# Patient Record
Sex: MALE | Race: ASIAN | NOT HISPANIC OR LATINO | Employment: FULL TIME | URBAN - METROPOLITAN AREA
[De-identification: names, ages, dates, MRNs, and addresses within clinical notes are randomized per-mention and may not be internally consistent; named-entity substitution may affect disease eponyms.]

---

## 2020-08-12 NOTE — TELEPHONE ENCOUNTER
RECORDS RECEIVED FROM: N/A   DATE RECEIVED: 2020   NOTES STATUS DETAILS   OFFICE NOTE from referring provider N/A    OFFICE NOTE from other specialist Care Everywhere [facility]:  - 17 Office visit with Dr. Marisel White  - 7/10/17 Office visit with Dr. Oni Fernandez  - 16 Office visit with Eleonora Forbes PA-C  - 16 Office visit with Dr. Abelardo Brooks    DISCHARGE SUMMARY from hospital Care Everywhere 17 ([facility])    OPERATIVE REPORT Care Everywhere Sigmoidoscopy Flexible: 17   MEDICATION LIST Care Everywhere         ENDOSCOPY  N/A    COLONOSCOPY Care Everywheree 18 ([facility])    ERCP N/A    EUS N/A      STOOL TESTING Care Everywhere 2020   PERTINENT LABS Care Everywhere    PATHOLOGY REPORTS (RELATED) Care Everywhere 18   IMAGING (CT, MRI, EGD) Care Everywhere CT Abdomen Pelvis: 17, 17 ([facility])       REFERRAL INFORMATION    Date referral was placed: 2020   Date all records received:    Date records were scanned into EPIC:    Date records were sent to Provider to review:    Date and recommendation received from provider:  LETTER SENT  SCHEDULE APPOINTMENT   Date patient was contacted to schedule: 2020 4:35pm Called and spoke with Pt. Pt noted that he has been seen with [facility] in [place]. [facility]'s records are accessible via Care Everywhere. -ao     2020 4:59pm Called [facility] Radiology dept; called to inquire about fax number to send request for images to. [facility] was given fax number: [phone]. [employer]. -Bhao    2020 3:40pm Called [facility] ([phone]) and asked to be directed to their medical records dept. Spoke with Dennise and she mentioned that I would need to speak with the radiology dept and they can help me; phone number of [phone] was given to [facility] to call.  "Called the radiology dept and spoke with Iona FONTAINE to contact [employer] at [phone]. CSS called [employer] and spoke with a rep (name was heard to understand) and per rep, she gave a different number of [phone] to call for the CD. CSS called the [phone] and phone kept ringing sapnae a while and then an automated message came on and said \"you have received a non working number at [employer]...\".     9/17/2020 4:07pm Fax request sent to [facility] ([phone]) for image. -Jose A         "

## 2020-09-21 NOTE — TELEPHONE ENCOUNTER
RECORDS RECEIVED FROM: N/A   DATE RECEIVED: 2020   NOTES STATUS DETAILS   OFFICE NOTE from referring provider N/A    OFFICE NOTE from other specialist Care Everywhere [facility]:  - 17 Office visit with Dr. Marisel White  - 7/10/17 Office visit with Dr. Oni Fernandez  - 16 Office visit with Eleonora Forbes PA-C  - 16 Office visit with Dr. Abelardo Brooks    DISCHARGE SUMMARY from hospital Care Everywhere 17 ([facility])    OPERATIVE REPORT Care Everywhere Sigmoidoscopy Flexible: 17   MEDICATION LIST Care Everywhere         ENDOSCOPY  N/A    COLONOSCOPY Care Everywheree 18 ([facility])    ERCP N/A    EUS N/A      STOOL TESTING Care Everywhere 2020   PERTINENT LABS Care Everywhere    PATHOLOGY REPORTS (RELATED) Care Everywhere 18   IMAGING (CT, MRI, EGD) Care Everywhere CT Abdomen Pelvis: 17, 17 ([facility])       REFERRAL INFORMATION    Date referral was placed: 2020   Date all records received:    Date records were scanned into EPIC:    Date records were sent to Provider to review:    Date and recommendation received from provider:  LETTER SENT  SCHEDULE APPOINTMENT   Date patient was contacted to schedule: 2020 4:35pm Called and spoke with Pt. Pt noted that he has been seen with [facility] in [place]. [facility]'s records are accessible via Care Everywhere. -ao     2020 4:59pm Called [facility] Radiology dept; called to inquire about fax number to send request for images to. [facility] was given fax number: [phone]. [employer]. -Bhao    2020 3:40pm Called [facility] ([phone]) and asked to be directed to their medical records dept. Spoke with Dennise and she mentioned that I would need to speak with the radiology dept and they can help me; phone number of [phone] was given to [facility] to call.  "Called the radiology dept and spoke with Iona FONTAINE to contact [employer] at [phone]. CSS called [employer] and spoke with a rep (name was hard to understand) and per rep, she gave a different number of [phone] to call for the CD. CSS called the [phone] and phone kept ringing foe a while and then an automated message came on and said \"you have received a non working number at [employer]...\". -Bhao    9/17/2020 4:07pm Fax request sent to [facility] ([phone]) for image. -Bhao     9/21/2020 11:28am Called [facility] medical records dept at [phone] and spoke with Jesenia. Per Jesenia, they do not have this Pt in their system. Called and LVM for Pt regarding images. CSS will notify MD/RN of missing images. -Bhao       "

## 2020-09-23 ENCOUNTER — PRE VISIT (OUTPATIENT)
Dept: GASTROENTEROLOGY | Facility: CLINIC | Age: 20
End: 2020-09-23

## 2020-09-23 ENCOUNTER — VIRTUAL VISIT (OUTPATIENT)
Dept: GASTROENTEROLOGY | Facility: CLINIC | Age: 20
End: 2020-09-23
Payer: COMMERCIAL

## 2020-09-23 VITALS — WEIGHT: 177 LBS | HEIGHT: 66 IN | BODY MASS INDEX: 28.45 KG/M2

## 2020-09-23 DIAGNOSIS — K51.00 ULCERATIVE PANCOLITIS (H): Primary | ICD-10-CM

## 2020-09-23 RX ORDER — ALBUTEROL SULFATE 90 UG/1
1-2 AEROSOL, METERED RESPIRATORY (INHALATION) EVERY 4 HOURS PRN
COMMUNITY
Start: 2019-12-04 | End: 2023-08-28

## 2020-09-23 RX ORDER — OMEPRAZOLE 40 MG/1
40 CAPSULE, DELAYED RELEASE ORAL DAILY PRN
COMMUNITY

## 2020-09-23 RX ORDER — BUDESONIDE AND FORMOTEROL FUMARATE DIHYDRATE 160; 4.5 UG/1; UG/1
2 AEROSOL RESPIRATORY (INHALATION)
COMMUNITY
Start: 2019-12-05 | End: 2020-09-28

## 2020-09-23 ASSESSMENT — MIFFLIN-ST. JEOR: SCORE: 1745.62

## 2020-09-23 ASSESSMENT — PAIN SCALES - GENERAL: PAINLEVEL: NO PAIN (0)

## 2020-09-23 NOTE — LETTER
Date:September 29, 2020      Patient was self referred, no letter generated. Do not send.        [facility] Health Information

## 2020-09-23 NOTE — LETTER
"    9/23/2020         RE: Jae Sparks  [address]  [place] [facility] [address]        Dear Colleague,    Thank you for referring your patient, Jae Sparks, to the [facility]STRO[facility] IBD CLINIC. Please see a copy of my visit note below.    Jae Sparks is a 20 year old male who is being evaluated via a billable video visit.      The patient has been notified of following:     \"This video visit will be conducted via a call between you and your physician/provider. We have found that certain health care needs can be provided without the need for an in-person physical exam.  This service lets us provide the care you need with a video conversation.  If a prescription is necessary we can send it directly to your pharmacy.  If lab work is needed we can place an order for that and you can then stop by our lab to have the test done at a later time.    Video visits are billed at different rates depending on your insurance coverage.  Please reach out to your insurance provider with any questions.    If during the course of the call the physician/provider feels a video visit is not appropriate, you will not be charged for this service.\"    Patient has given verbal consent for Video visit? Yes  How would you like to obtain your AVS? MyChart  If you are dropped from the video visit, the video invite should be resent to: Text to cell phone: [phone]  Will anyone else be joining your video visit? No       During this virtual visit the patient is located in [place], patient verifies this as the location during the entirety of this visit.     [facility] UC NEW       PATIENT: Jae Sparks    MRN: [id number]    Date of Birth 2000    Tel: [phone] (home)     PCP: No primary care provider on file.     HPI: Mr. Sparks is a 20 year old year old male here to establish care for ulcerative pancolitis.     UC history    Jae was diagnosed with pan-UC in late 2017. He was started on " prednisone and Humira 8/2017. He felt somewhat better on Humira but lost response about a week with continued symptoms. Trough level in February 2018 -undetectable Humira level without antibodies and dose changed to weekly 2/2018.     Could not continue with self injections of Humira, so switched to Inflectra May 2020 (Dr. Landeros,  in [place]). Dose increased to 7.5 mg/kg q8 w when level was low (4.58, 0 ATIs) and FC was elevated at 441.     Last inflectra dose was 8 weeks ago.      Macroscopic extent of disease (most recent) E3    Current UC symptoms    Bowel frequency in day 3 (in the morning)   Bowel frequency in night 0  Urgency of defecation occasional  Blood in stool none  General well being 0 = very well  Extracolonic features (multiple select) none     Constitutional symptoms:  Fever NO  Weight loss NO    Noteworthy diet history- no dairy, low fiber     Other GI symptoms present none    Total number of IBD surgeries (except perianal): 0    Current IBD Medications:  Inflectra    Past IBD Medications:   Prednisone  Humira    Past Medical History:   Diagnosis Date     Uncomplicated asthma         Past Surgical History:   Procedure Laterality Date     NO HISTORY OF SURGERY         Social History     Tobacco Use     Smoking status: Never Smoker     Smokeless tobacco: Never Used   Substance Use Topics     Alcohol use: Not on file       Family History   Problem Relation Age of Onset     Inflammatory Bowel Disease No family hx of      Autoimmune Disease No family hx of      Colon Cancer No family hx of        Allergies   Allergen Reactions     Pollen Extract Itching     Cantaloupe (Diagnostic) Hives and Itching        Outpatient Encounter Medications as of 9/23/2020   Medication Sig Dispense Refill     albuterol (PROAIR HFA/PROVENTIL HFA/VENTOLIN HFA) 108 (90 Base) MCG/ACT inhaler        budesonide-formoterol (SYMBICORT) 160-4.5 MCG/ACT Inhaler Inhale 2 puffs into the lungs       inFLIXimab-dyyb (INFLECTRA) 100 MG  "injection Inject 400 mg into the vein       omeprazole (PRILOSEC) 40 MG DR capsule Take 40 mg by mouth       No facility-administered encounter medications on file as of 9/23/2020.      NSAID  NO    Review of Systems  Complete 10 System ROS performed. All are negative except as documented below, in the HPI, or in patient questionnaire from today's visit.    1) Constitutional: No fevers, chills, night sweats or malaise, weight loss or gain  2) Skin: No rash  3) Pulmonary: No wheeze, SOB, cough, sputum or hemoptysis  4) Cardiovascular: No Chest pain or palpitations  5) Genitourinary: No blood in urine or dysuria  6) Endocrine: No increased sweating, hunger, thirst or thyroid problems  7) Hematologic: No bruising and easy bleeding  8) Musculoskeletal: no new pain in joints or limitation in ROM  9) Neurologic: No dizziness, paresthesias or weakness or falls  10) Psychiatric:  not depressed/anxious, no sleep problems    PHYSICAL EXAM  Vitals: Ht 1.676 m (5' 6\")   Wt 80.3 kg (177 lb)   BMI 28.57 kg/m      No Pain (0)     General appearance  Healthy appearing adult, in no acute distress     Eyes  Sclera anicteric  Pupils round and reactive to light     Ears, nose, mouth and throat  No obvious external lesions of ears and nose  Hearing intact     Neck  Symmetric  No obvious external lesions     Respiratory  Normal respiration, no use of accessory muscles      MSK  Gait normal     Skin  No rashes or jaundice      Psychiatric  Oriented to person, place and time  Appropriate mood and affect.   DATA:  Reviewed in detail past documentation, medications and prior workup available in electronic health records or through outside records.    PERTINENT STUDIES:  Tuberculosis: QFN neg 12/2019  HBV serology neg in 1/2018    DRUG MONITORING  Biologic concentration: IFX trough 4.58 (ATIs were not tested, given presence of drug level)    5/16/2016 Flex sig: Signs of colonic inflammation identified in the left colon predominantly, likely " beyond  Bx - mild active colitis, favored to be a resolving or self limited infectious colitis    7/10/2017 Flex sig: Moderate colitis from rectum to 40cm consistent with ulcerative colitis.  Bx - chronic colitis, moderately active.  Treated with lialda, an ti-TNF agent discussed but not started.    8/2/2017 CT Abdomen/Pelvis: ? Acute pyelonephritis, diffuse colon wall thickening.    8/4/2017 Sigmoidoscopy: Punched out deep ulceration/edema/erythema up to 70 cm. Biopsies take from mid-descending, distal descending, proximal sigmoid, distal sigmoid, and rectum.    6/7/2018 Colonoscopy for IBD disease assessment:  Scarring seen throughout the colon c/w healed ulcerative colitis. Scattered pseudopolyps seen throughout the colon, most marked in the ascending colon and cecum. Mild pinpoint erosions were seen in the terminal ileum. Random biopsies were taken throughout.   Endoscopic examination was performed to the Terminal ileum. This was for assessment of ulcerative colitis disease activity.  The Lee score for each colonic segment is below:  Rectum: 0 (normal) - normal path  Sigmoid colon: 0 (normal) - normal path  Descending colon: 0 (normal) - normal path  Transverse colon: 0 (normal) - normal path  Ascending colon: 0 (normal) - focal active colitis  Cecum: 0 (normal) - normal path  Terminal ileum: Mild pinpoint erosions. - focal active ileitis - ?medication or prep effect  Based on your overall colonoscopy findings, you have colitis that is Completely healed (Lee 0), though there is possible mild terminal ileitis.       IMPRESSION:    DIAGNOSIS:  # E3 UC in clinical remission on inflectra 7.5 mg/kg q8w    Mr. Sparks is a 20 year old here with E3 UC in clinical remission on Inflectra 7.5 mg/kg q8w. His dose was recently increased from 5 mg/kg given FC of 404 in 7/28/2020.  We will plan for the following:    PLAN:  ---Expedite Inflectra infusion at 7.5 mg/kg q8 weeks  ---Check fecal calprotectin  ------------If  elevated, will check Inflectra level and adjust accordingly.   ---OK to liberalize diet     IBD Health Care Maintenance:  ---Referral to dermatology  ---Referral to [facility] pharmacy for healthcare maintenance   ---Colon cancer screening to start in 2025    Misc:  -- Avoid tobacco use  -- Avoid NSAIDs as there is potentially a 25% chance of causing an IBD flare    Return in about 3 months (around 12/23/2020).    Jigna Eng MD   of Medicine  Division of Gastroenterology, Hepatology and Nutrition  [facility]    September 23, 2020      Video-Visit Details    Type of service:  Video Visit    Video Start Time: 3:45 PM  Video End Time:  4:30 PM    Originating Location (pt. Location): Home    Distant Location (provider location):  [facility] GASTROENTEROLOGY AND IBD CLINIC     Platform used for Video Visit: [employer]            Again, thank you for allowing me to participate in the care of your patient.        Sincerely,        Jigna Eng MD

## 2020-09-23 NOTE — PROGRESS NOTES
"Jae Sparks is a 20 year old male who is being evaluated via a billable video visit.      The patient has been notified of following:     \"This video visit will be conducted via a call between you and your physician/provider. We have found that certain health care needs can be provided without the need for an in-person physical exam.  This service lets us provide the care you need with a video conversation.  If a prescription is necessary we can send it directly to your pharmacy.  If lab work is needed we can place an order for that and you can then stop by our lab to have the test done at a later time.    Video visits are billed at different rates depending on your insurance coverage.  Please reach out to your insurance provider with any questions.    If during the course of the call the physician/provider feels a video visit is not appropriate, you will not be charged for this service.\"    Patient has given verbal consent for Video visit? Yes  How would you like to obtain your AVS? MyChart  If you are dropped from the video visit, the video invite should be resent to: Text to cell phone: [phone]  Will anyone else be joining your video visit? No       During this virtual visit the patient is located in [place], patient verifies this as the location during the entirety of this visit.     [facility] UC NEW       PATIENT: Jae Sparks    MRN: [id number]    Date of Birth 2000    Tel: [phone] (home)     PCP: No primary care provider on file.     HPI: Mr. Sparks is a 20 year old year old male here to establish care for ulcerative pancolitis.     UC history    Jae was diagnosed with pan-UC in late 2017. He was started on prednisone and Humira 8/2017. He felt somewhat better on Humira but lost response about a week with continued symptoms. Trough level in February 2018 -undetectable Humira level without antibodies and dose changed to weekly 2/2018.     Could not continue with self " injections of Humira, so switched to Inflectra May 2020 (Dr. Landeros,  in [place]). Dose increased to 7.5 mg/kg q8 w when level was low (4.58, 0 ATIs) and FC was elevated at 441.     Last inflectra dose was 8 weeks ago.      Macroscopic extent of disease (most recent) E3    Current UC symptoms    Bowel frequency in day 3 (in the morning)   Bowel frequency in night 0  Urgency of defecation occasional  Blood in stool none  General well being 0 = very well  Extracolonic features (multiple select) none     Constitutional symptoms:  Fever NO  Weight loss NO    Noteworthy diet history- no dairy, low fiber     Other GI symptoms present none    Total number of IBD surgeries (except perianal): 0    Current IBD Medications:  Inflectra    Past IBD Medications:   Prednisone  Humira    Past Medical History:   Diagnosis Date     Uncomplicated asthma         Past Surgical History:   Procedure Laterality Date     NO HISTORY OF SURGERY         Social History     Tobacco Use     Smoking status: Never Smoker     Smokeless tobacco: Never Used   Substance Use Topics     Alcohol use: Not on file       Family History   Problem Relation Age of Onset     Inflammatory Bowel Disease No family hx of      Autoimmune Disease No family hx of      Colon Cancer No family hx of        Allergies   Allergen Reactions     Pollen Extract Itching     Cantaloupe (Diagnostic) Hives and Itching        Outpatient Encounter Medications as of 9/23/2020   Medication Sig Dispense Refill     albuterol (PROAIR HFA/PROVENTIL HFA/VENTOLIN HFA) 108 (90 Base) MCG/ACT inhaler        budesonide-formoterol (SYMBICORT) 160-4.5 MCG/ACT Inhaler Inhale 2 puffs into the lungs       inFLIXimab-dyyb (INFLECTRA) 100 MG injection Inject 400 mg into the vein       omeprazole (PRILOSEC) 40 MG DR capsule Take 40 mg by mouth       No facility-administered encounter medications on file as of 9/23/2020.      NSAID  NO    Review of Systems  Complete 10 System ROS performed. All are negative  "except as documented below, in the HPI, or in patient questionnaire from today's visit.    1) Constitutional: No fevers, chills, night sweats or malaise, weight loss or gain  2) Skin: No rash  3) Pulmonary: No wheeze, SOB, cough, sputum or hemoptysis  4) Cardiovascular: No Chest pain or palpitations  5) Genitourinary: No blood in urine or dysuria  6) Endocrine: No increased sweating, hunger, thirst or thyroid problems  7) Hematologic: No bruising and easy bleeding  8) Musculoskeletal: no new pain in joints or limitation in ROM  9) Neurologic: No dizziness, paresthesias or weakness or falls  10) Psychiatric:  not depressed/anxious, no sleep problems    PHYSICAL EXAM  Vitals: Ht 1.676 m (5' 6\")   Wt 80.3 kg (177 lb)   BMI 28.57 kg/m      No Pain (0)     General appearance  Healthy appearing adult, in no acute distress     Eyes  Sclera anicteric  Pupils round and reactive to light     Ears, nose, mouth and throat  No obvious external lesions of ears and nose  Hearing intact     Neck  Symmetric  No obvious external lesions     Respiratory  Normal respiration, no use of accessory muscles      MSK  Gait normal     Skin  No rashes or jaundice      Psychiatric  Oriented to person, place and time  Appropriate mood and affect.   DATA:  Reviewed in detail past documentation, medications and prior workup available in electronic health records or through outside records.    PERTINENT STUDIES:  Tuberculosis: QFN neg 12/2019  HBV serology neg in 1/2018    DRUG MONITORING  Biologic concentration: IFX trough 4.58 (ATIs were not tested, given presence of drug level)    5/16/2016 Flex sig: Signs of colonic inflammation identified in the left colon predominantly, likely beyond  Bx - mild active colitis, favored to be a resolving or self limited infectious colitis    7/10/2017 Flex sig: Moderate colitis from rectum to 40cm consistent with ulcerative colitis.  Bx - chronic colitis, moderately active.  Treated with lialda, an ti-TNF " agent discussed but not started.    8/2/2017 CT Abdomen/Pelvis: ? Acute pyelonephritis, diffuse colon wall thickening.    8/4/2017 Sigmoidoscopy: Punched out deep ulceration/edema/erythema up to 70 cm. Biopsies take from mid-descending, distal descending, proximal sigmoid, distal sigmoid, and rectum.    6/7/2018 Colonoscopy for IBD disease assessment:  Scarring seen throughout the colon c/w healed ulcerative colitis. Scattered pseudopolyps seen throughout the colon, most marked in the ascending colon and cecum. Mild pinpoint erosions were seen in the terminal ileum. Random biopsies were taken throughout.   Endoscopic examination was performed to the Terminal ileum. This was for assessment of ulcerative colitis disease activity.  The Lee score for each colonic segment is below:  Rectum: 0 (normal) - normal path  Sigmoid colon: 0 (normal) - normal path  Descending colon: 0 (normal) - normal path  Transverse colon: 0 (normal) - normal path  Ascending colon: 0 (normal) - focal active colitis  Cecum: 0 (normal) - normal path  Terminal ileum: Mild pinpoint erosions. - focal active ileitis - ?medication or prep effect  Based on your overall colonoscopy findings, you have colitis that is Completely healed (Lee 0), though there is possible mild terminal ileitis.       IMPRESSION:    DIAGNOSIS:  # E3 UC in clinical remission on inflectra 7.5 mg/kg q8w    Mr. Sparks is a 20 year old here with E3 UC in clinical remission on Inflectra 7.5 mg/kg q8w. His dose was recently increased from 5 mg/kg given FC of 404 in 7/28/2020.  We will plan for the following:    PLAN:  ---Expedite Inflectra infusion at 7.5 mg/kg q8 weeks  ---Check fecal calprotectin  ------------If elevated, will check Inflectra level and adjust accordingly.   ---OK to liberalize diet     IBD Health Care Maintenance:  ---Referral to dermatology  ---Referral to [facility] pharmacy for healthcare maintenance   ---Colon cancer screening to start in 2025    Misc:  -- Avoid  tobacco use  -- Avoid NSAIDs as there is potentially a 25% chance of causing an IBD flare    Return in about 3 months (around 12/23/2020).    Jigna Eng MD   of Medicine  Division of Gastroenterology, Hepatology and Nutrition  [facility]    September 23, 2020      Video-Visit Details    Type of service:  Video Visit    Video Start Time: 3:45 PM  Video End Time:  4:30 PM    Originating Location (pt. Location): Home    Distant Location (provider location):  [facility] GASTROENTEROLOGY AND IBD CLINIC     Platform used for Video Visit: [employer]

## 2020-09-23 NOTE — NURSING NOTE
"Chief Complaint   Patient presents with     Consult     Appointment for ulcerative colitis.       Vitals:    09/23/20 1524   Weight: 80.3 kg (177 lb)   Height: 1.676 m (5' 6\")       Body mass index is 28.57 kg/m .                            ANGELA RAMIREZ, EMT    "

## 2020-09-23 NOTE — PATIENT INSTRUCTIONS
PLAN  ---Referral to dermatology  ---Referral to [facility] pharmacy for healthcare maintenance   ---OK to liberalize diet   ---Expedite Inflectra infusion at 7.5 mg/kg q8 weeks  ---Check fecal calprotectin  ------------If elevated, will check Inflectra level and adjust accordingly.

## 2020-09-24 ENCOUNTER — PATIENT OUTREACH (OUTPATIENT)
Dept: GASTROENTEROLOGY | Facility: CLINIC | Age: 20
End: 2020-09-24

## 2020-09-24 DIAGNOSIS — K51.90 ULCERATIVE COLITIS (H): ICD-10-CM

## 2020-09-24 RX ORDER — DIPHENHYDRAMINE HCL 25 MG
25 CAPSULE ORAL ONCE
Status: CANCELLED
Start: 2020-09-24

## 2020-09-24 RX ORDER — METHYLPREDNISOLONE SODIUM SUCCINATE 125 MG/2ML
125 INJECTION, POWDER, LYOPHILIZED, FOR SOLUTION INTRAMUSCULAR; INTRAVENOUS ONCE
Status: CANCELLED | OUTPATIENT
Start: 2020-09-24

## 2020-09-24 RX ORDER — ACETAMINOPHEN 325 MG/1
650 TABLET ORAL ONCE
Status: CANCELLED
Start: 2020-09-24

## 2020-09-24 NOTE — PROGRESS NOTES
Patient called back and is in agreement with in home infusion  Will do calprotectin stool study   Aware that he will be called for derm appt and mtm    Follow up appt   Discussed calprotectin.  In basket to team to sent collection kit

## 2020-09-24 NOTE — PROGRESS NOTES
Patient establishing care [facility]. Eng  Patient due for inflectra today  Patient received 7.5 mg/kg every 8 weeks.   Inflectra therapy plan entered with standing lab orders every other infusion  Contact prior team for a stat determination on site of care.

## 2020-09-24 NOTE — PROGRESS NOTES
Patient needs to have oupatient infusion  Josefina will work on obtaining prior   Sent pt a my chart message and voice mail message.

## 2020-09-25 ENCOUNTER — HOME INFUSION (PRE-WILLOW HOME INFUSION) (OUTPATIENT)
Dept: PHARMACY | Facility: CLINIC | Age: 20
End: 2020-09-25

## 2020-09-25 NOTE — PROGRESS NOTES
[facility] will approve remicade so therapy plan edited to reflect this change.   [facility] will reach out to pt to set up appointment.    Left a message and my chart message for patient

## 2020-09-28 ENCOUNTER — VIRTUAL VISIT (OUTPATIENT)
Dept: PHARMACY | Facility: CLINIC | Age: 20
End: 2020-09-28
Payer: COMMERCIAL

## 2020-09-28 ENCOUNTER — PATIENT OUTREACH (OUTPATIENT)
Dept: GASTROENTEROLOGY | Facility: CLINIC | Age: 20
End: 2020-09-28

## 2020-09-28 DIAGNOSIS — J30.2 SEASONAL ALLERGIC RHINITIS, UNSPECIFIED TRIGGER: ICD-10-CM

## 2020-09-28 DIAGNOSIS — K51.00 ULCERATIVE PANCOLITIS (H): Primary | ICD-10-CM

## 2020-09-28 PROCEDURE — 99607 MTMS BY PHARM ADDL 15 MIN: CPT | Mod: TEL | Performed by: PHARMACIST

## 2020-09-28 PROCEDURE — 99605 MTMS BY PHARM NP 15 MIN: CPT | Mod: TEL | Performed by: PHARMACIST

## 2020-09-28 RX ORDER — CETIRIZINE HYDROCHLORIDE 10 MG/1
10 TABLET ORAL DAILY
Status: CANCELLED
Start: 2020-09-28

## 2020-09-28 RX ORDER — ACETAMINOPHEN 325 MG/1
650 TABLET ORAL ONCE
Status: CANCELLED
Start: 2020-09-28

## 2020-09-28 RX ORDER — FEXOFENADINE HCL 180 MG/1
180 TABLET ORAL DAILY PRN
COMMUNITY

## 2020-09-28 NOTE — PROGRESS NOTES
Patient has been taking tylenol and cetirzine 30 minutes prioir to his infusions in the past and therapy plan edited to reflect this. Daria from [facility] pharmacist notified. Patient aware. First infusion on September 30.

## 2020-09-28 NOTE — PROGRESS NOTES
This is a recent snapshot of the patient's [facility] medical record.  For current drug dose and complete information and questions, call [phone] or In Basket pool, fv home infusion ([id number])  CSN Number:  [id number]

## 2020-09-28 NOTE — PROGRESS NOTES
MTM ENCOUNTER  SUBJECTIVE/OBJECTIVE:                           Jae Sparks is a 20 year old male called for an initial visit. He was referred to me from Dr. Eng.    Patient consented to a telehealth visit: yes  Telemedicine Visit Details  Type of service:  Telephone visit  Start Time: 1:00 PM  End Time: 1:22 PM  Originating Location (pt. Location): Home  Distant Location (provider location):  [facility] MTM  Mode of Communication:  Telephone    Chief Complaint: None - IBD health maintenance review requested by provider    Allergies/ADRs: Reviewed in chart  Tobacco: He reports that he has never smoked. He has never used smokeless tobacco.  Alcohol: 1-3 beverages / week    Medication Adherence/Access: no issues reported    Ulcerative Colitis:   Inflectra + pre-meds (acetaminophen and cetirizine) 7.5 mg/kg every 8 weeks   Omeprazole 40 mg PRN    Recently moved here to go to [employer]. Saw Dr. Eng for a virtual visit on 9/23/20. His dose of an Inflectra was recently increased to 7.5 mg/kg from 5 mg/kg. He notes that omeprazole is primary needed if he is having a flare.    IBD Health Care Maintenance:    Vaccinations:  All patients on biologics should avoid live vaccines.    -- Influenza (every year) reports getting  9/23/2020  -- TdaP (every 10 years) last 5/12/2016  -- Pneumococcal Pneumonia (once plus booster at 5 years)   - Prevnar-13 1/10/2018   - Pneumovax-23 3/26/2018, due 2023  -- Yearly assessment for latent Tb (verbal screening and exam, PPD or QuantiFERON-Tb testing)   - negative 5/3/2019    One time confirmation of immunity or serologies:  -- Hepatitis A (serologies or immunizations) vaccination complete 2002  -- Hepatitis B (serologies or immunizations) vaccination complete 1998, booster x 1 dose 2016 1/31/2018 Hepatitis B Surface Ab Unit <=9.99 mIU/mL 5.44 Hep Bs Ab Interp Negative Comment: An antibody of 10.00 mIU/ml or greater implies immunity.    Reference interval                    9.99 mIU/ml or less: Negative                   10.00 mIU/ml or greater: Positive     -- Varicella vaccination complete 5/20/2016  -- MMR third dose 5/20/2016  -- HPV (all aged 18-26)  1/21/2019, 8/4/2014, 8/19/2013  -- Meningococcal meningitis (all patients at risk for meningitis)-- 5/12/2016 and 8/4/2014    Due to the immunosuppression in this patient, I would not advise administration of live vaccines such as varicella/VZV, intranasal influenza, MMR, or yellow fever vaccine (if traveling).      Bone mineral density screening   -- Recommend all patients supplement with calcium and vitamin D  -- DEXA scan 11/20/2019 per [facility] density is within the expected range for age based on the z-score  - no steroid exposure in the last year     Cancer Screening:  Colon cancer screening: to start in 2025 per provider notes.    Skin cancer screening: Annual visual exam of skin by dermatologist since patient is immunocompromised Referral    Depression Screening:  -- Over the last month, have you felt down, depressed, or hopeless? No  -- Over the last month, have you felt little interest or pleasure doing things? No    Misc:  -- Avoid tobacco use  -- Avoid NSAIDs as there is potentially a 25% chance of causing an IBD flare      Allergies:   Albuterol HFA  Allegra daily prn allergies spring-summer    Hasn't used the albuterol in awhile. Notes this is mainly only for when he gets sick. Not needing Allegra at this time. No reported concerns.    ASSESSMENT:                            Medication Adherence: No issues identified    Ulcerative Colitis: Jae would likely benefit from considering a repeat of the Hep B series given his available serologies indicating lack of immunity.     Asthma/Allergies: Stable based on report.    PLAN:                            1. Jae to consider the following vaccines:  - consider repeating Hep B series    I spent 22 minutes with this patient today. I offer these suggestions for  consideration by his care team. A copy of the visit note was provided to the patient's referring provider.    Will follow up in 1 year for health maintenance review, sooner if needed.    The patient was sent via [employer] a summary of these recommendations.     Janet MoralesD, BCACP  MTM Pharmacist   [facility]  Phone: [phone]

## 2020-09-29 ENCOUNTER — HOME INFUSION (PRE-WILLOW HOME INFUSION) (OUTPATIENT)
Dept: PHARMACY | Facility: CLINIC | Age: 20
End: 2020-09-29

## 2020-09-30 ENCOUNTER — MEDICAL CORRESPONDENCE (OUTPATIENT)
Dept: HEALTH INFORMATION MANAGEMENT | Facility: CLINIC | Age: 20
End: 2020-09-30

## 2020-09-30 ENCOUNTER — HOME INFUSION (PRE-WILLOW HOME INFUSION) (OUTPATIENT)
Dept: PHARMACY | Facility: CLINIC | Age: 20
End: 2020-09-30

## 2020-09-30 LAB
ALBUMIN SERPL-MCNC: 4.3 G/DL (ref 3.4–5)
ALP SERPL-CCNC: 79 U/L (ref 40–150)
ALT SERPL W P-5'-P-CCNC: 28 U/L (ref 0–70)
AST SERPL W P-5'-P-CCNC: 18 U/L (ref 0–45)
BASOPHILS # BLD AUTO: 0 10E9/L (ref 0–0.2)
BASOPHILS NFR BLD AUTO: 0.3 %
BILIRUB DIRECT SERPL-MCNC: 0.1 MG/DL (ref 0–0.2)
BILIRUB SERPL-MCNC: 0.7 MG/DL (ref 0.2–1.3)
CRP SERPL-MCNC: 12.4 MG/L (ref 0–8)
DIFFERENTIAL METHOD BLD: NORMAL
EOSINOPHIL # BLD AUTO: 0.1 10E9/L (ref 0–0.7)
EOSINOPHIL NFR BLD AUTO: 1 %
ERYTHROCYTE [DISTWIDTH] IN BLOOD BY AUTOMATED COUNT: 12.8 % (ref 10–15)
ERYTHROCYTE [SEDIMENTATION RATE] IN BLOOD BY WESTERGREN METHOD: 5 MM/H (ref 0–15)
HCT VFR BLD AUTO: 52.1 % (ref 40–53)
HGB BLD-MCNC: 17.4 G/DL (ref 13.3–17.7)
IMM GRANULOCYTES # BLD: 0 10E9/L (ref 0–0.4)
IMM GRANULOCYTES NFR BLD: 0.3 %
LYMPHOCYTES # BLD AUTO: 1.5 10E9/L (ref 0.8–5.3)
LYMPHOCYTES NFR BLD AUTO: 16.2 %
MCH RBC QN AUTO: 29.8 PG (ref 26.5–33)
MCHC RBC AUTO-ENTMCNC: 33.4 G/DL (ref 31.5–36.5)
MCV RBC AUTO: 89 FL (ref 78–100)
MONOCYTES # BLD AUTO: 0.7 10E9/L (ref 0–1.3)
MONOCYTES NFR BLD AUTO: 8.1 %
NEUTROPHILS # BLD AUTO: 6.6 10E9/L (ref 1.6–8.3)
NEUTROPHILS NFR BLD AUTO: 74.1 %
NRBC # BLD AUTO: 0 10*3/UL
NRBC BLD AUTO-RTO: 0 /100
PLATELET # BLD AUTO: 273 10E9/L (ref 150–450)
PROT SERPL-MCNC: 8.4 G/DL (ref 6.8–8.8)
RBC # BLD AUTO: 5.83 10E12/L (ref 4.4–5.9)
WBC # BLD AUTO: 8.9 10E9/L (ref 4–11)

## 2020-09-30 PROCEDURE — 80076 HEPATIC FUNCTION PANEL: CPT | Performed by: INTERNAL MEDICINE

## 2020-09-30 PROCEDURE — 85025 COMPLETE CBC W/AUTO DIFF WBC: CPT | Performed by: INTERNAL MEDICINE

## 2020-09-30 PROCEDURE — 86140 C-REACTIVE PROTEIN: CPT | Performed by: INTERNAL MEDICINE

## 2020-09-30 PROCEDURE — 85652 RBC SED RATE AUTOMATED: CPT | Performed by: INTERNAL MEDICINE

## 2020-10-01 NOTE — PATIENT INSTRUCTIONS
Recommendations from today's MTM visit:                                                    MTM (medication therapy management) is a service provided by a clinical pharmacist designed to help you get the most of out of your medicines.   Today we reviewed what your medicines are for, how to know if they are working, that your medicines are safe and how to make your medicine regimen as easy as possible.     1. Your blood work done at [facility] in 2018 indicated that you are not protected against Hepatitis B at this time. Consider repeating this vaccination series.    It was great to speak with you today.  I value your experience and would be very thankful for your time with providing feedback on our clinic survey. You may receive a survey via email or text message in the next few days.     Next MTM visit: 1 year for health maintenance review, sooner if needed    To schedule another MTM appointment, please call the clinic directly or you may call the MTM scheduling line at [phone] or toll-free at [phone].     My Clinical Pharmacist's contact information:                                                      It was a pleasure talking with you today!  Please feel free to contact me with any questions or concerns you have.      Janet MoralesD, BCACP  MTM Pharmacist    [facility]  Phone: [phone]

## 2020-10-01 NOTE — PROGRESS NOTES
Skilled Nurse visit in the  patient home to administer Remicade 600 mg IV.  No recent elevated temperature, fever, chills, productive cough, coughing for 3 weeks or longer or hemoptysis, abnormal vital signs, night sweats, chest pain. No  decrease in appetite, unexplained weight loss or fatigue.  No other new onset medical symptoms.  Current weight 173#.  PIV placed in R FA  With two attempts.  Pre medicated with zyrtec and tylenol. Labs drawn: CBCDP, ESR, CRP and Hepatic panel. Infusion completed without complication or reaction. Pt reports therapy is highly effective in managing symptoms related to therapy.

## 2020-10-08 DIAGNOSIS — K51.00 ULCERATIVE PANCOLITIS (H): ICD-10-CM

## 2020-10-08 PROCEDURE — 99000 SPECIMEN HANDLING OFFICE-LAB: CPT | Performed by: PATHOLOGY

## 2020-10-08 PROCEDURE — 83993 ASSAY FOR CALPROTECTIN FECAL: CPT | Mod: 90 | Performed by: PATHOLOGY

## 2020-10-09 LAB — CALPROTECTIN STL-MCNT: 29.2 MG/KG (ref 0–49.9)

## 2020-10-09 NOTE — PROGRESS NOTES
This is a recent snapshot of the patient's [facility] medical record.  For current drug dose and complete information and questions, call [phone] or In Basket pool, fv home infusion ([id number])  CSN Number:  [id number]

## 2020-10-12 ENCOUNTER — HOME INFUSION (PRE-WILLOW HOME INFUSION) (OUTPATIENT)
Dept: PHARMACY | Facility: CLINIC | Age: 20
End: 2020-10-12

## 2020-10-13 NOTE — PROGRESS NOTES
This is a recent snapshot of the patient's [facility] medical record.  For current drug dose and complete information and questions, call [phone] or In Basket pool, fv home infusion ([id number])  CSN Number:  [id number]

## 2020-11-22 ENCOUNTER — HOME INFUSION (PRE-WILLOW HOME INFUSION) (OUTPATIENT)
Dept: PHARMACY | Facility: CLINIC | Age: 20
End: 2020-11-22

## 2020-11-23 ENCOUNTER — HOME INFUSION (PRE-WILLOW HOME INFUSION) (OUTPATIENT)
Dept: PHARMACY | Facility: CLINIC | Age: 20
End: 2020-11-23

## 2020-11-23 NOTE — PROGRESS NOTES
This is a recent snapshot of the patient's [facility] medical record.  For current drug dose and complete information and questions, call [phone] or In Basket pool, fv home infusion ([id number])  CSN Number:  [id number]

## 2020-11-24 NOTE — PROGRESS NOTES
This is a recent snapshot of the patient's [facility] medical record.  For current drug dose and complete information and questions, call [phone] or In Basket pool, fv home infusion ([id number])  CSN Number:  [id number]

## 2020-12-01 ENCOUNTER — APPOINTMENT (OUTPATIENT)
Dept: LAB | Facility: CLINIC | Age: 20
End: 2020-12-01
Attending: INTERNAL MEDICINE
Payer: COMMERCIAL

## 2020-12-29 ENCOUNTER — VIRTUAL VISIT (OUTPATIENT)
Dept: GASTROENTEROLOGY | Facility: CLINIC | Age: 20
End: 2020-12-29
Payer: COMMERCIAL

## 2020-12-29 VITALS — HEIGHT: 66 IN | BODY MASS INDEX: 28.12 KG/M2 | WEIGHT: 175 LBS

## 2020-12-29 DIAGNOSIS — K51.00 ULCERATIVE PANCOLITIS WITHOUT COMPLICATION (H): Primary | ICD-10-CM

## 2020-12-29 PROCEDURE — 99213 OFFICE O/P EST LOW 20 MIN: CPT | Mod: 95 | Performed by: INTERNAL MEDICINE

## 2020-12-29 ASSESSMENT — MIFFLIN-ST. JEOR: SCORE: 1728.6

## 2020-12-29 NOTE — PROGRESS NOTES
"Jae Sparks is a 20 year old male who is being evaluated via a billable video visit.      The patient has been notified of following:     \"This video visit will be conducted via a call between you and your physician/provider. We have found that certain health care needs can be provided without the need for an in-person physical exam.  This service lets us provide the care you need with a video conversation.  If a prescription is necessary we can send it directly to your pharmacy.  If lab work is needed we can place an order for that and you can then stop by our lab to have the test done at a later time.    Video visits are billed at different rates depending on your insurance coverage.  Please reach out to your insurance provider with any questions.    If during the course of the call the physician/provider feels a video visit is not appropriate, you will not be charged for this service.\"    Patient has given verbal consent for Video visit? Yes  How would you like to obtain your AVS? MyChart  If you are dropped from the video visit, the video invite should be resent to: Text to cell phone: [phone]  Will anyone else be joining your video visit? No      [facility] UC follow up       PATIENT: Jae Sparks    MRN: [id number]    Date of Birth 2000    Tel: [phone] (home)     PCP: No primary care provider on file.     HPI: Mr. Sparks is a 20 year old year old male here to establish care for ulcerative pancolitis.     UC history    Jae was diagnosed with pan-UC in late 2017. He was started on prednisone and Humira 8/2017. He felt somewhat better on Humira but lost response about a week with continued symptoms. Trough level in February 2018 -undetectable Humira level without antibodies and dose changed to weekly 2/2018.     Could not continue with self injections of Humira, so switched to Inflectra May 2020 (Dr. Landeros,  in [place]). Dose increased to 7.5 mg/kg q8 w when level was low " (4.58, 0 ATIs) and FC was elevated at 441.     Last inflectra dose was 8 weeks ago.      Macroscopic extent of disease (most recent) E3    Current UC symptoms    Bowel frequency in day 3 (in the morning)   Bowel frequency in night 0  Urgency of defecation occasional  Blood in stool none  General well being 0 = very well  Extracolonic features (multiple select) none     Constitutional symptoms:  Fever NO  Weight loss NO    Noteworthy diet history- no dairy, low fiber     Other GI symptoms present none    Total number of IBD surgeries (except perianal): 0    Current IBD Medications:  Inflectra    Past IBD Medications:   Prednisone  Humira      Interval history, 12/2020  Doing well. Next inflectra on 1/18/2021. No breakthrough symptoms.     Current UC symptoms    Bowel frequency in day 2-3   Bowel frequency in night 0  Urgency of defecation occasional  Blood in stool none  General well being 0 = very well  Extracolonic features (multiple select) none     Past Medical History:   Diagnosis Date     Uncomplicated asthma         Past Surgical History:   Procedure Laterality Date     NO HISTORY OF SURGERY         Social History     Tobacco Use     Smoking status: Never Smoker     Smokeless tobacco: Never Used   Substance Use Topics     Alcohol use: Not on file       Family History   Problem Relation Age of Onset     Inflammatory Bowel Disease No family hx of      Autoimmune Disease No family hx of      Colon Cancer No family hx of        Allergies   Allergen Reactions     Pollen Extract Itching     Cantaloupe (Diagnostic) Hives and Itching        Outpatient Encounter Medications as of 12/29/2020   Medication Sig Dispense Refill     albuterol (PROAIR HFA/PROVENTIL HFA/VENTOLIN HFA) 108 (90 Base) MCG/ACT inhaler        fexofenadine (ALLEGRA) 180 MG tablet Take 180 mg by mouth daily as needed for allergies (in spring and summer)       inFLIXimab-dyyb (INFLECTRA) 100 MG injection Inject 400 mg into the vein       omeprazole  "(PRILOSEC) 40 MG DR capsule Take 40 mg by mouth daily as needed        No facility-administered encounter medications on file as of 12/29/2020.      NSAID  NO    Review of Systems  Complete 10 System ROS performed. All are negative except as documented below, in the HPI, or in patient questionnaire from today's visit.    1) Constitutional: No fevers, chills, night sweats or malaise, weight loss or gain  2) Skin: No rash  3) Pulmonary: No wheeze, SOB, cough, sputum or hemoptysis  4) Cardiovascular: No Chest pain or palpitations  5) Genitourinary: No blood in urine or dysuria  6) Endocrine: No increased sweating, hunger, thirst or thyroid problems  7) Hematologic: No bruising and easy bleeding  8) Musculoskeletal: no new pain in joints or limitation in ROM  9) Neurologic: No dizziness, paresthesias or weakness or falls  10) Psychiatric:  not depressed/anxious, no sleep problems    PHYSICAL EXAM  Vitals: Ht 1.664 m (5' 5.5\")   Wt 79.4 kg (175 lb)   BMI 28.68 kg/m      Data Unavailable     General appearance  Healthy appearing adult, in no acute distress     Eyes  Sclera anicteric  Pupils round and reactive to light     Ears, nose, mouth and throat  No obvious external lesions of ears and nose  Hearing intact     Neck  Symmetric  No obvious external lesions     Respiratory  Normal respiration, no use of accessory muscles      MSK  Gait normal     Skin  No rashes or jaundice      Psychiatric  Oriented to person, place and time  Appropriate mood and affect.   DATA:  Reviewed in detail past documentation, medications and prior workup available in electronic health records or through outside records.    PERTINENT STUDIES:  Tuberculosis: QFN neg 12/2019  HBV serology neg in 1/2018    DRUG MONITORING  Biologic concentration: IFX trough 4.58 (ATIs were not tested, given presence of drug level)    5/16/2016 Flex sig: Signs of colonic inflammation identified in the left colon predominantly, likely beyond  Bx - mild active " colitis, favored to be a resolving or self limited infectious colitis    7/10/2017 Flex sig: Moderate colitis from rectum to 40cm consistent with ulcerative colitis.  Bx - chronic colitis, moderately active.  Treated with lialda, an ti-TNF agent discussed but not started.    8/2/2017 CT Abdomen/Pelvis: ? Acute pyelonephritis, diffuse colon wall thickening.    8/4/2017 Sigmoidoscopy: Punched out deep ulceration/edema/erythema up to 70 cm. Biopsies take from mid-descending, distal descending, proximal sigmoid, distal sigmoid, and rectum.    6/7/2018 Colonoscopy for IBD disease assessment:  Scarring seen throughout the colon c/w healed ulcerative colitis. Scattered pseudopolyps seen throughout the colon, most marked in the ascending colon and cecum. Mild pinpoint erosions were seen in the terminal ileum. Random biopsies were taken throughout.   Endoscopic examination was performed to the Terminal ileum. This was for assessment of ulcerative colitis disease activity.  The Lee score for each colonic segment is below:  Rectum: 0 (normal) - normal path  Sigmoid colon: 0 (normal) - normal path  Descending colon: 0 (normal) - normal path  Transverse colon: 0 (normal) - normal path  Ascending colon: 0 (normal) - focal active colitis  Cecum: 0 (normal) - normal path  Terminal ileum: Mild pinpoint erosions. - focal active ileitis - ?medication or prep effect  Based on your overall colonoscopy findings, you have colitis that is Completely healed (Lee 0), though there is possible mild terminal ileitis.       IMPRESSION:    DIAGNOSIS:  # E3 UC in clinical remission on inflectra 7.5 mg/kg q8w    Mr. Sparks is a 20 year old here with E3 UC in clinical remission on Inflectra 7.5 mg/kg q8w. His dose was increased from 5 mg/kg given FC of 404 in 7/28/2020.  He continues to do well and FC was normal in 10/2020.  We will plan for the following:    PLAN:  ---Continue inflectra infusion at 7.5 mg/kg q8 weeks  ---Establish care with a  PCP    IBD Health Care Maintenance:  ---See dermatology for a FBSE when able   ---Greatly appreciate [facility] pharmacy visit for healthcare maintenance. Recommend repeat HBV series.    ---Colon cancer screening to start in 2025    Misc:  -- Avoid tobacco use  -- Avoid NSAIDs as there is potentially a 25% chance of causing an IBD flare    Return in about 6 months (around 6/29/2021).    Jigna Eng MD   of Medicine  Division of Gastroenterology, Hepatology and Nutrition  [facility]      Video-Visit Details    Type of service:  Video Visit    Video Start Time: 1:17 PM  Video End Time: 1:40 PM    Originating Location (pt. Location): Home    Distant Location (provider location):  [facility] [place]     Platform used for Video Visit: Lotame

## 2020-12-29 NOTE — LETTER
"  12/29/2020       RE: Jae Sparks  [address]  [facility] [address]      Dear Colleague,    Thank you for referring your patient, Jae Sparks, to the [facility] [place]. Please see a copy of my visit note below.    Jae Sparks is a 20 year old male who is being evaluated via a billable video visit.    The patient has been notified of following:     \"This video visit will be conducted via a call between you and your physician/provider. We have found that certain health care needs can be provided without the need for an in-person physical exam.  This service lets us provide the care you need with a video conversation.  If a prescription is necessary we can send it directly to your pharmacy.  If lab work is needed we can place an order for that and you can then stop by our lab to have the test done at a later time.    Video visits are billed at different rates depending on your insurance coverage.  Please reach out to your insurance provider with any questions.    If during the course of the call the physician/provider feels a video visit is not appropriate, you will not be charged for this service.\"    Patient has given verbal consent for Video visit? Yes  How would you like to obtain your AVS? MyChart  If you are dropped from the video visit, the video invite should be resent to: Text to cell phone: [phone]  Will anyone else be joining your video visit? No      [facility] UC follow up       PATIENT: Jae Sparks    MRN: [id number]    Date of Birth 2000    Tel: [phone] (home)     PCP: No primary care provider on file.     HPI: Mr. Sparks is a 20 year old year old male here to establish care for ulcerative pancolitis.     UC history    Jae was diagnosed with pan-UC in late 2017. He was started on prednisone and Humira 8/2017. He felt somewhat better on Humira but lost response about a week with continued symptoms. Trough " level in February 2018 -undetectable Humira level without antibodies and dose changed to weekly 2/2018.     Could not continue with self injections of Humira, so switched to Inflectra May 2020 (Dr. Landeros,  in [place]). Dose increased to 7.5 mg/kg q8 w when level was low (4.58, 0 ATIs) and FC was elevated at 441.     Last inflectra dose was 8 weeks ago.      Macroscopic extent of disease (most recent) E3    Current UC symptoms    Bowel frequency in day 3 (in the morning)   Bowel frequency in night 0  Urgency of defecation occasional  Blood in stool none  General well being 0 = very well  Extracolonic features (multiple select) none     Constitutional symptoms:  Fever NO  Weight loss NO    Noteworthy diet history- no dairy, low fiber     Other GI symptoms present none    Total number of IBD surgeries (except perianal): 0    Current IBD Medications:  Inflectra    Past IBD Medications:   Prednisone  Humira    Interval history, 12/2020  Doing well. Next inflectra on 1/18/2021. No breakthrough symptoms.     Current UC symptoms    Bowel frequency in day 2-3   Bowel frequency in night 0  Urgency of defecation occasional  Blood in stool none  General well being 0 = very well  Extracolonic features (multiple select) none     Past Medical History:   Diagnosis Date     Uncomplicated asthma         Past Surgical History:   Procedure Laterality Date     NO HISTORY OF SURGERY         Social History     Tobacco Use     Smoking status: Never Smoker     Smokeless tobacco: Never Used   Substance Use Topics     Alcohol use: Not on file       Family History   Problem Relation Age of Onset     Inflammatory Bowel Disease No family hx of      Autoimmune Disease No family hx of      Colon Cancer No family hx of        Allergies   Allergen Reactions     Pollen Extract Itching     Cantaloupe (Diagnostic) Hives and Itching        Outpatient Encounter Medications as of 12/29/2020   Medication Sig Dispense Refill     albuterol (PROAIR HFA/PROVENTIL  "HFA/VENTOLIN HFA) 108 (90 Base) MCG/ACT inhaler        fexofenadine (ALLEGRA) 180 MG tablet Take 180 mg by mouth daily as needed for allergies (in spring and summer)       inFLIXimab-dyyb (INFLECTRA) 100 MG injection Inject 400 mg into the vein       omeprazole (PRILOSEC) 40 MG DR capsule Take 40 mg by mouth daily as needed        No facility-administered encounter medications on file as of 12/29/2020.      NSAID  NO    Review of Systems  Complete 10 System ROS performed. All are negative except as documented below, in the HPI, or in patient questionnaire from today's visit.    1) Constitutional: No fevers, chills, night sweats or malaise, weight loss or gain  2) Skin: No rash  3) Pulmonary: No wheeze, SOB, cough, sputum or hemoptysis  4) Cardiovascular: No Chest pain or palpitations  5) Genitourinary: No blood in urine or dysuria  6) Endocrine: No increased sweating, hunger, thirst or thyroid problems  7) Hematologic: No bruising and easy bleeding  8) Musculoskeletal: no new pain in joints or limitation in ROM  9) Neurologic: No dizziness, paresthesias or weakness or falls  10) Psychiatric:  not depressed/anxious, no sleep problems    PHYSICAL EXAM  Vitals: Ht 1.664 m (5' 5.5\")   Wt 79.4 kg (175 lb)   BMI 28.68 kg/m      Data Unavailable     General appearance  Healthy appearing adult, in no acute distress     Eyes  Sclera anicteric  Pupils round and reactive to light     Ears, nose, mouth and throat  No obvious external lesions of ears and nose  Hearing intact     Neck  Symmetric  No obvious external lesions     Respiratory  Normal respiration, no use of accessory muscles      MSK  Gait normal     Skin  No rashes or jaundice      Psychiatric  Oriented to person, place and time  Appropriate mood and affect.   DATA:  Reviewed in detail past documentation, medications and prior workup available in electronic health records or through outside records.    PERTINENT STUDIES:  Tuberculosis: QFN neg 12/2019  HBV " serology neg in 1/2018    DRUG MONITORING  Biologic concentration: IFX trough 4.58 (ATIs were not tested, given presence of drug level)    5/16/2016 Flex sig: Signs of colonic inflammation identified in the left colon predominantly, likely beyond  Bx - mild active colitis, favored to be a resolving or self limited infectious colitis    7/10/2017 Flex sig: Moderate colitis from rectum to 40cm consistent with ulcerative colitis.  Bx - chronic colitis, moderately active.  Treated with lialda, an ti-TNF agent discussed but not started.    8/2/2017 CT Abdomen/Pelvis: ? Acute pyelonephritis, diffuse colon wall thickening.    8/4/2017 Sigmoidoscopy: Punched out deep ulceration/edema/erythema up to 70 cm. Biopsies take from mid-descending, distal descending, proximal sigmoid, distal sigmoid, and rectum.    6/7/2018 Colonoscopy for IBD disease assessment:  Scarring seen throughout the colon c/w healed ulcerative colitis. Scattered pseudopolyps seen throughout the colon, most marked in the ascending colon and cecum. Mild pinpoint erosions were seen in the terminal ileum. Random biopsies were taken throughout.   Endoscopic examination was performed to the Terminal ileum. This was for assessment of ulcerative colitis disease activity.  The Lee score for each colonic segment is below:  Rectum: 0 (normal) - normal path  Sigmoid colon: 0 (normal) - normal path  Descending colon: 0 (normal) - normal path  Transverse colon: 0 (normal) - normal path  Ascending colon: 0 (normal) - focal active colitis  Cecum: 0 (normal) - normal path  Terminal ileum: Mild pinpoint erosions. - focal active ileitis - ?medication or prep effect  Based on your overall colonoscopy findings, you have colitis that is Completely healed (Lee 0), though there is possible mild terminal ileitis.     IMPRESSION:    DIAGNOSIS:  # E3 UC in clinical remission on inflectra 7.5 mg/kg q8w    Mr. Sparks is a 20 year old here with E3 UC in clinical remission on  Inflectra 7.5 mg/kg q8w. His dose was increased from 5 mg/kg given FC of 404 in 7/28/2020.  He continues to do well and FC was normal in 10/2020.  We will plan for the following:    PLAN:  ---Continue inflectra infusion at 7.5 mg/kg q8 weeks  ---Establish care with a PCP    IBD Health Care Maintenance:  ---See dermatology for a FBSE when able   ---Greatly appreciate [facility] pharmacy visit for healthcare maintenance. Recommend repeat HBV series.    ---Colon cancer screening to start in 2025    Misc:  -- Avoid tobacco use  -- Avoid NSAIDs as there is potentially a 25% chance of causing an IBD flare    Return in about 6 months (around 6/29/2021).    Jigna Eng MD   of Medicine  Division of Gastroenterology, Hepatology and Nutrition  [facility]      Video-Visit Details    Type of service:  Video Visit    Video Start Time: 1:17 PM  Video End Time: 1:40 PM    Originating Location (pt. Location): Home    Distant Location (provider location):  [facility] [place]     Platform used for Video Visit: [employer]

## 2020-12-29 NOTE — NURSING NOTE
"Chief Complaint   Patient presents with     Follow Up     3 months follow up       Vitals:    12/29/20 1242   Weight: 79.4 kg (175 lb)   Height: 1.664 m (5' 5.5\")       Body mass index is 28.68 kg/m .    Romy Sampson CMA    "

## 2021-01-04 ENCOUNTER — DOCUMENTATION ONLY (OUTPATIENT)
Dept: GASTROENTEROLOGY | Facility: CLINIC | Age: 21
End: 2021-01-04

## 2021-01-04 ENCOUNTER — HEALTH MAINTENANCE LETTER (OUTPATIENT)
Age: 21
End: 2021-01-04

## 2021-01-04 NOTE — PROGRESS NOTES
Action 1/4/2020 2:38pm -Jose A    Action Taken Received images on a disc from [facility]; dropped off to [facility] 4N.    Images- CT Abdomen Pelvis: 8/5/17 and 8/2/17

## 2021-01-14 ENCOUNTER — HOME INFUSION (PRE-WILLOW HOME INFUSION) (OUTPATIENT)
Dept: PHARMACY | Facility: CLINIC | Age: 21
End: 2021-01-14

## 2021-01-15 NOTE — PROGRESS NOTES
This is a recent snapshot of the patient's [facility] medical record.  For current drug dose and complete information and questions, call [phone] or In Basket pool, fv home infusion ([id number])  CSN Number:  [id number]

## 2021-01-18 ENCOUNTER — HOME INFUSION (PRE-WILLOW HOME INFUSION) (OUTPATIENT)
Dept: PHARMACY | Facility: CLINIC | Age: 21
End: 2021-01-18

## 2021-01-18 ENCOUNTER — MEDICAL CORRESPONDENCE (OUTPATIENT)
Dept: HEALTH INFORMATION MANAGEMENT | Facility: CLINIC | Age: 21
End: 2021-01-18

## 2021-01-18 ENCOUNTER — DOCUMENTATION ONLY (OUTPATIENT)
Dept: PHARMACY | Facility: CLINIC | Age: 21
End: 2021-01-18

## 2021-01-18 LAB
ALBUMIN SERPL-MCNC: 4.1 G/DL (ref 3.4–5)
ALP SERPL-CCNC: 79 U/L (ref 40–150)
ALT SERPL W P-5'-P-CCNC: 30 U/L (ref 0–70)
AST SERPL W P-5'-P-CCNC: 17 U/L (ref 0–45)
BASOPHILS # BLD AUTO: 0 10E9/L (ref 0–0.2)
BASOPHILS NFR BLD AUTO: 0.3 %
BILIRUB DIRECT SERPL-MCNC: 0.1 MG/DL (ref 0–0.2)
BILIRUB SERPL-MCNC: 0.5 MG/DL (ref 0.2–1.3)
CRP SERPL-MCNC: 3.1 MG/L (ref 0–8)
DIFFERENTIAL METHOD BLD: ABNORMAL
EOSINOPHIL # BLD AUTO: 0.2 10E9/L (ref 0–0.7)
EOSINOPHIL NFR BLD AUTO: 2.7 %
ERYTHROCYTE [DISTWIDTH] IN BLOOD BY AUTOMATED COUNT: 12 % (ref 10–15)
ERYTHROCYTE [SEDIMENTATION RATE] IN BLOOD BY WESTERGREN METHOD: 4 MM/H (ref 0–15)
HCT VFR BLD AUTO: 52.7 % (ref 40–53)
HGB BLD-MCNC: 18.1 G/DL (ref 13.3–17.7)
IMM GRANULOCYTES # BLD: 0 10E9/L (ref 0–0.4)
IMM GRANULOCYTES NFR BLD: 0.2 %
LYMPHOCYTES # BLD AUTO: 3.5 10E9/L (ref 0.8–5.3)
LYMPHOCYTES NFR BLD AUTO: 39.2 %
MCH RBC QN AUTO: 30.3 PG (ref 26.5–33)
MCHC RBC AUTO-ENTMCNC: 34.3 G/DL (ref 31.5–36.5)
MCV RBC AUTO: 88 FL (ref 78–100)
MONOCYTES # BLD AUTO: 0.8 10E9/L (ref 0–1.3)
MONOCYTES NFR BLD AUTO: 9.5 %
NEUTROPHILS # BLD AUTO: 4.3 10E9/L (ref 1.6–8.3)
NEUTROPHILS NFR BLD AUTO: 48.1 %
NRBC # BLD AUTO: 0 10*3/UL
NRBC BLD AUTO-RTO: 0 /100
PLATELET # BLD AUTO: 305 10E9/L (ref 150–450)
PROT SERPL-MCNC: 8.3 G/DL (ref 6.8–8.8)
RBC # BLD AUTO: 5.98 10E12/L (ref 4.4–5.9)
WBC # BLD AUTO: 8.9 10E9/L (ref 4–11)

## 2021-01-18 PROCEDURE — 86140 C-REACTIVE PROTEIN: CPT | Performed by: INTERNAL MEDICINE

## 2021-01-18 PROCEDURE — 85652 RBC SED RATE AUTOMATED: CPT | Performed by: INTERNAL MEDICINE

## 2021-01-18 PROCEDURE — 80076 HEPATIC FUNCTION PANEL: CPT | Performed by: INTERNAL MEDICINE

## 2021-01-18 PROCEDURE — 85025 COMPLETE CBC W/AUTO DIFF WBC: CPT | Performed by: INTERNAL MEDICINE

## 2021-01-18 NOTE — PROGRESS NOTES
Skilled Nurse visit in the  patient home to administer Remicade 600 mg in 250 mL NS.  No recent elevated temperature, fever, chills, productive cough, coughing for 3 weeks or longer or hemoptysis, abnormal vital signs, night sweats, chest pain. No  decrease in your appetite, unexplained weight loss or fatigue.  No other new onset medical symptoms.  Current weight 177 lbs.  PIV placed L AC, 1 attempt/s.  Pre medicated with tylenol 650 mg PO and claritin 10 mg PO. Labs drawn CBC/d, CRP, ESR, hepatic panel. Infusion completed without complication or reaction. Pt reports therapy is effective in managing symptoms related to therapy.    Agustina Khan RN  Field Nurse   [facility]   [address] [place] [address]   [email or web address]   Cell: [phone]  Fax [phone]

## 2021-01-19 NOTE — PROGRESS NOTES
This is a recent snapshot of the patient's [facility] medical record.  For current drug dose and complete information and questions, call [phone] or In Basket pool, fv home infusion ([id number])  CSN Number:  [id number]

## 2021-01-21 NOTE — PROGRESS NOTES
This is a recent snapshot of the patient's [facility] medical record.  For current drug dose and complete information and questions, call [phone] or In Basket pool, fv home infusion ([id number])  CSN Number:  [id number]

## 2021-02-01 ENCOUNTER — APPOINTMENT (OUTPATIENT)
Dept: LAB | Facility: CLINIC | Age: 21
End: 2021-02-01
Attending: INTERNAL MEDICINE
Payer: COMMERCIAL

## 2021-03-01 ENCOUNTER — APPOINTMENT (OUTPATIENT)
Dept: LAB | Facility: CLINIC | Age: 21
End: 2021-03-01
Attending: INTERNAL MEDICINE
Payer: COMMERCIAL

## 2021-03-11 ENCOUNTER — HOME INFUSION (PRE-WILLOW HOME INFUSION) (OUTPATIENT)
Dept: PHARMACY | Facility: CLINIC | Age: 21
End: 2021-03-11

## 2021-03-12 NOTE — PROGRESS NOTES
This is a recent snapshot of the patient's [facility] medical record.  For current drug dose and complete information and questions, call [phone] or In Basket pool, fv home infusion ([id number])  CSN Number:  [id number]

## 2021-03-15 ENCOUNTER — HOME INFUSION (PRE-WILLOW HOME INFUSION) (OUTPATIENT)
Dept: PHARMACY | Facility: CLINIC | Age: 21
End: 2021-03-15

## 2021-03-17 NOTE — PROGRESS NOTES
This is a recent snapshot of the patient's [facility] medical record.  For current drug dose and complete information and questions, call [phone] or In Basket pool, fv home infusion ([id number])  CSN Number:  [id number]

## 2021-04-01 ENCOUNTER — APPOINTMENT (OUTPATIENT)
Dept: LAB | Facility: CLINIC | Age: 21
End: 2021-04-01
Attending: INTERNAL MEDICINE
Payer: COMMERCIAL

## 2021-05-06 ENCOUNTER — HOME INFUSION (PRE-WILLOW HOME INFUSION) (OUTPATIENT)
Dept: PHARMACY | Facility: CLINIC | Age: 21
End: 2021-05-06

## 2021-05-10 ENCOUNTER — MEDICAL CORRESPONDENCE (OUTPATIENT)
Dept: HEALTH INFORMATION MANAGEMENT | Facility: CLINIC | Age: 21
End: 2021-05-10

## 2021-05-10 ENCOUNTER — HOME INFUSION (PRE-WILLOW HOME INFUSION) (OUTPATIENT)
Dept: PHARMACY | Facility: CLINIC | Age: 21
End: 2021-05-10

## 2021-05-10 LAB
ALBUMIN SERPL-MCNC: 4.1 G/DL (ref 3.4–5)
ALP SERPL-CCNC: 68 U/L (ref 40–150)
ALT SERPL W P-5'-P-CCNC: 31 U/L (ref 0–70)
AST SERPL W P-5'-P-CCNC: 17 U/L (ref 0–45)
BASOPHILS # BLD AUTO: 0 10E9/L (ref 0–0.2)
BASOPHILS NFR BLD AUTO: 0.3 %
BILIRUB DIRECT SERPL-MCNC: <0.1 MG/DL (ref 0–0.2)
BILIRUB SERPL-MCNC: 0.6 MG/DL (ref 0.2–1.3)
CRP SERPL-MCNC: 4.4 MG/L (ref 0–8)
DIFFERENTIAL METHOD BLD: ABNORMAL
EOSINOPHIL # BLD AUTO: 0.2 10E9/L (ref 0–0.7)
EOSINOPHIL NFR BLD AUTO: 2 %
ERYTHROCYTE [DISTWIDTH] IN BLOOD BY AUTOMATED COUNT: 12.2 % (ref 10–15)
ERYTHROCYTE [SEDIMENTATION RATE] IN BLOOD BY WESTERGREN METHOD: 4 MM/H (ref 0–15)
HCT VFR BLD AUTO: 52.4 % (ref 40–53)
HGB BLD-MCNC: 17.7 G/DL (ref 13.3–17.7)
IMM GRANULOCYTES # BLD: 0 10E9/L (ref 0–0.4)
IMM GRANULOCYTES NFR BLD: 0.3 %
LYMPHOCYTES # BLD AUTO: 2.8 10E9/L (ref 0.8–5.3)
LYMPHOCYTES NFR BLD AUTO: 31.1 %
MCH RBC QN AUTO: 29.8 PG (ref 26.5–33)
MCHC RBC AUTO-ENTMCNC: 33.8 G/DL (ref 31.5–36.5)
MCV RBC AUTO: 88 FL (ref 78–100)
MONOCYTES # BLD AUTO: 0.9 10E9/L (ref 0–1.3)
MONOCYTES NFR BLD AUTO: 9.6 %
NEUTROPHILS # BLD AUTO: 5 10E9/L (ref 1.6–8.3)
NEUTROPHILS NFR BLD AUTO: 56.7 %
NRBC # BLD AUTO: 0 10*3/UL
NRBC BLD AUTO-RTO: 0 /100
PLATELET # BLD AUTO: 332 10E9/L (ref 150–450)
PROT SERPL-MCNC: 8.1 G/DL (ref 6.8–8.8)
RBC # BLD AUTO: 5.93 10E12/L (ref 4.4–5.9)
WBC # BLD AUTO: 9.1 10E9/L (ref 4–11)

## 2021-05-10 PROCEDURE — 85025 COMPLETE CBC W/AUTO DIFF WBC: CPT | Performed by: INTERNAL MEDICINE

## 2021-05-10 PROCEDURE — 80076 HEPATIC FUNCTION PANEL: CPT | Performed by: INTERNAL MEDICINE

## 2021-05-10 PROCEDURE — 86140 C-REACTIVE PROTEIN: CPT | Performed by: INTERNAL MEDICINE

## 2021-05-10 PROCEDURE — 85652 RBC SED RATE AUTOMATED: CPT | Performed by: INTERNAL MEDICINE

## 2021-05-18 NOTE — PROGRESS NOTES
This is a recent snapshot of the patient's [facility] medical record.  For current drug dose and complete information and questions, call [phone] or In Basket pool, fv home infusion ([id number])  CSN Number:  [id number]

## 2021-07-01 ENCOUNTER — APPOINTMENT (OUTPATIENT)
Dept: LAB | Facility: CLINIC | Age: 21
End: 2021-07-01
Attending: INTERNAL MEDICINE
Payer: COMMERCIAL

## 2021-07-01 ENCOUNTER — HOME INFUSION (PRE-WILLOW HOME INFUSION) (OUTPATIENT)
Dept: PHARMACY | Facility: CLINIC | Age: 21
End: 2021-07-01

## 2021-07-06 ENCOUNTER — DOCUMENTATION ONLY (OUTPATIENT)
Dept: PHARMACY | Facility: CLINIC | Age: 21
End: 2021-07-06

## 2021-07-06 ENCOUNTER — HOME INFUSION (PRE-WILLOW HOME INFUSION) (OUTPATIENT)
Dept: PHARMACY | Facility: CLINIC | Age: 21
End: 2021-07-06

## 2021-07-06 NOTE — PROGRESS NOTES
Skilled Nurse visit in the  patient home to administer remicade 600 mg.  No recent elevated temperature, fever, chills, productive cough, coughing for 3 weeks or longer or hemoptysis, abnormal vital signs, night sweats, chest pain. No  decrease in your appetite, unexplained weight loss or fatigue.  No other new onset medical symptoms.  Current weight 190.  PIV placed in left ac, 1 attempt/s.  Pre medicated with tylneol 1000 mg, claritin 10 mg. Labs drawn none. Infusion completed without complication or reaction. Pt reports therapy is effective in managing symptoms related to therapy.    Elba Dang RN BSN  [facility]  Email: [email or web address]  Phone: [phone]

## 2021-07-08 ENCOUNTER — HOME INFUSION (PRE-WILLOW HOME INFUSION) (OUTPATIENT)
Dept: PHARMACY | Facility: CLINIC | Age: 21
End: 2021-07-08

## 2021-07-08 NOTE — PROGRESS NOTES
This is a recent snapshot of the patient's [facility] medical record.  For current drug dose and complete information and questions, call [phone] or In Basket pool, fv home infusion ([id number])  CSN Number:  [id number]

## 2021-07-09 NOTE — PROGRESS NOTES
This is a recent snapshot of the patient's [facility] medical record.  For current drug dose and complete information and questions, call [phone] or In Basket pool, fv home infusion ([id number])  CSN Number:  [id number]

## 2021-07-13 NOTE — PROGRESS NOTES
This is a recent snapshot of the patient's [facility] medical record.  For current drug dose and complete information and questions, call [phone] or In Basket pool, fv home infusion ([id number])  CSN Number:  [id number]

## 2021-07-14 ENCOUNTER — HOME INFUSION (PRE-WILLOW HOME INFUSION) (OUTPATIENT)
Dept: PHARMACY | Facility: CLINIC | Age: 21
End: 2021-07-14

## 2021-07-20 NOTE — PROGRESS NOTES
This is a recent snapshot of the patient's [facility] medical record.  For current drug dose and complete information and questions, call [phone] or In Basket pool, fv home infusion ([id number])  CSN Number:  [id number]

## 2021-07-21 ENCOUNTER — HOME INFUSION (PRE-WILLOW HOME INFUSION) (OUTPATIENT)
Dept: PHARMACY | Facility: CLINIC | Age: 21
End: 2021-07-21

## 2021-07-26 NOTE — PROGRESS NOTES
This is a recent snapshot of the patient's [facility] medical record.  For current drug dose and complete information and questions, call [phone] or In Basket pool, fv home infusion ([id number])  CSN Number:  [id number]

## 2021-07-28 ENCOUNTER — HOME INFUSION (PRE-WILLOW HOME INFUSION) (OUTPATIENT)
Dept: PHARMACY | Facility: CLINIC | Age: 21
End: 2021-07-28

## 2021-08-02 NOTE — PROGRESS NOTES
This is a recent snapshot of the patient's [facility] medical record.  For current drug dose and complete information and questions, call [phone] or In Basket pool, fv home infusion ([id number])  CSN Number:  [id number]

## 2021-08-02 NOTE — PROGRESS NOTES
This is a recent snapshot of the patient's [facility] medical record.  For current drug dose and complete information and questions, call [phone] or In Basket pool, fv home infusion ([id number])  CSN Number:  [id number]

## 2021-08-26 ENCOUNTER — HOME INFUSION (PRE-WILLOW HOME INFUSION) (OUTPATIENT)
Dept: PHARMACY | Facility: CLINIC | Age: 21
End: 2021-08-26

## 2021-08-30 ENCOUNTER — HOME INFUSION (PRE-WILLOW HOME INFUSION) (OUTPATIENT)
Dept: PHARMACY | Facility: CLINIC | Age: 21
End: 2021-08-30

## 2021-08-30 ENCOUNTER — LAB REQUISITION (OUTPATIENT)
Dept: LAB | Facility: CLINIC | Age: 21
End: 2021-08-30
Payer: COMMERCIAL

## 2021-08-30 DIAGNOSIS — K51.90 ULCERATIVE COLITIS, UNSPECIFIED, WITHOUT COMPLICATIONS (H): ICD-10-CM

## 2021-08-30 LAB
ALBUMIN SERPL-MCNC: 4.2 G/DL (ref 3.4–5)
ALP SERPL-CCNC: 67 U/L (ref 40–150)
ALT SERPL W P-5'-P-CCNC: 38 U/L (ref 0–70)
AST SERPL W P-5'-P-CCNC: 23 U/L (ref 0–45)
BASOPHILS # BLD AUTO: 0 10E3/UL (ref 0–0.2)
BASOPHILS NFR BLD AUTO: 0 %
BILIRUB DIRECT SERPL-MCNC: 0.1 MG/DL (ref 0–0.2)
BILIRUB SERPL-MCNC: 0.7 MG/DL (ref 0.2–1.3)
CRP SERPL-MCNC: 3.2 MG/L (ref 0–8)
EOSINOPHIL # BLD AUTO: 0.1 10E3/UL (ref 0–0.7)
EOSINOPHIL NFR BLD AUTO: 1 %
ERYTHROCYTE [DISTWIDTH] IN BLOOD BY AUTOMATED COUNT: 12.8 % (ref 10–15)
ERYTHROCYTE [SEDIMENTATION RATE] IN BLOOD BY WESTERGREN METHOD: 5 MM/HR (ref 0–15)
HCT VFR BLD AUTO: 49.9 % (ref 40–53)
HGB BLD-MCNC: 17.1 G/DL (ref 13.3–17.7)
HOLD SPECIMEN: NORMAL
HOLD SPECIMEN: NORMAL
IMM GRANULOCYTES # BLD: 0 10E3/UL
IMM GRANULOCYTES NFR BLD: 0 %
LYMPHOCYTES # BLD AUTO: 2.7 10E3/UL (ref 0.8–5.3)
LYMPHOCYTES NFR BLD AUTO: 29 %
MCH RBC QN AUTO: 30.2 PG (ref 26.5–33)
MCHC RBC AUTO-ENTMCNC: 34.3 G/DL (ref 31.5–36.5)
MCV RBC AUTO: 88 FL (ref 78–100)
MONOCYTES # BLD AUTO: 0.7 10E3/UL (ref 0–1.3)
MONOCYTES NFR BLD AUTO: 7 %
NEUTROPHILS # BLD AUTO: 5.7 10E3/UL (ref 1.6–8.3)
NEUTROPHILS NFR BLD AUTO: 63 %
NRBC # BLD AUTO: 0 10E3/UL
NRBC BLD AUTO-RTO: 0 /100
PLATELET # BLD AUTO: 274 10E3/UL (ref 150–450)
PROT SERPL-MCNC: 8.2 G/DL (ref 6.8–8.8)
RBC # BLD AUTO: 5.67 10E6/UL (ref 4.4–5.9)
WBC # BLD AUTO: 9.3 10E3/UL (ref 4–11)

## 2021-08-30 PROCEDURE — 85025 COMPLETE CBC W/AUTO DIFF WBC: CPT | Mod: ORL | Performed by: INTERNAL MEDICINE

## 2021-08-30 PROCEDURE — 85652 RBC SED RATE AUTOMATED: CPT | Mod: ORL | Performed by: INTERNAL MEDICINE

## 2021-08-30 PROCEDURE — 86140 C-REACTIVE PROTEIN: CPT | Mod: ORL | Performed by: INTERNAL MEDICINE

## 2021-08-30 PROCEDURE — 80076 HEPATIC FUNCTION PANEL: CPT | Mod: ORL | Performed by: INTERNAL MEDICINE

## 2021-09-01 NOTE — PROGRESS NOTES
This is a recent snapshot of the patient's [facility] medical record.  For current drug dose and complete information and questions, call [phone] or In Basket pool, fv home infusion ([id number])  CSN Number:  [id number]

## 2021-09-22 ENCOUNTER — HOME INFUSION (PRE-WILLOW HOME INFUSION) (OUTPATIENT)
Dept: PHARMACY | Facility: CLINIC | Age: 21
End: 2021-09-22

## 2021-09-22 NOTE — PROGRESS NOTES
This is a recent snapshot of the patient's [facility] medical record.  For current drug dose and complete information and questions, call [phone] or In Basket pool, fv home infusion ([id number])  CSN Number:  [id number]

## 2021-09-24 ENCOUNTER — TELEPHONE (OUTPATIENT)
Dept: PHARMACY | Facility: CLINIC | Age: 21
End: 2021-09-24

## 2021-09-24 NOTE — TELEPHONE ENCOUNTER
[facility] PRIOR AUTHORIZATION REQUEST     Drug including DOSE:REMICADE 600 mg IV Every 8 weeks  J Code:   47941 15810  NDC: [address]-05  ICD 10 code: k51.90    Date(s) of Service: 9/25/2021    Insurance Name: [facility]  Insurance ID: [id number]    Provider: RICARDO LUCAS MD  Provider NPI: [id number]      [facility]  NPI: [id number]

## 2021-09-24 NOTE — TELEPHONE ENCOUNTER
PA Initiation    Medication: REMICADE 600 mg IV Every 8 weeks  Insurance Company: [employer] - Phone [phone] Fax [phone]  Pharmacy Filling the Rx: [facility]  Filling Pharmacy Phone:    Filling Pharmacy Fax:    Start Date: 9/24/2021    Central Prior Authorization Team   Phone: [phone]

## 2021-09-28 NOTE — TELEPHONE ENCOUNTER
Prior Authorization Approval    Authorization Effective Date: 9/25/2021  Authorization Expiration Date: 9/24/2022  Medication: REMICADE 600 mg IV Every 8 weeks  Approved Dose/Quantity:   Reference #: [id number]   Insurance Company: [employer] [place] - Phone [phone] Fax [phone]  Which Pharmacy is filling the prescription (Not needed for infusion/clinic administered): [facility]  Pharmacy Notified: Yes

## 2021-09-29 ENCOUNTER — HOME INFUSION (PRE-WILLOW HOME INFUSION) (OUTPATIENT)
Dept: PHARMACY | Facility: CLINIC | Age: 21
End: 2021-09-29

## 2021-10-05 ENCOUNTER — HOME INFUSION (PRE-WILLOW HOME INFUSION) (OUTPATIENT)
Dept: PHARMACY | Facility: CLINIC | Age: 21
End: 2021-10-05

## 2021-10-11 ENCOUNTER — HEALTH MAINTENANCE LETTER (OUTPATIENT)
Age: 21
End: 2021-10-11

## 2021-10-12 NOTE — PROGRESS NOTES
This is a recent snapshot of the patient's [facility] medical record.  For current drug dose and complete information and questions, call [phone] or In Basket pool, fv home infusion ([id number])  CSN Number:  [id number]

## 2021-10-13 ENCOUNTER — HOME INFUSION (PRE-WILLOW HOME INFUSION) (OUTPATIENT)
Dept: PHARMACY | Facility: CLINIC | Age: 21
End: 2021-10-13
Payer: COMMERCIAL

## 2021-10-18 ENCOUNTER — HOME INFUSION (PRE-WILLOW HOME INFUSION) (OUTPATIENT)
Dept: PHARMACY | Facility: CLINIC | Age: 21
End: 2021-10-18

## 2021-10-19 ENCOUNTER — HOME INFUSION (PRE-WILLOW HOME INFUSION) (OUTPATIENT)
Dept: PHARMACY | Facility: CLINIC | Age: 21
End: 2021-10-19

## 2021-10-19 ENCOUNTER — PATIENT OUTREACH (OUTPATIENT)
Dept: GASTROENTEROLOGY | Facility: CLINIC | Age: 21
End: 2021-10-19
Payer: COMMERCIAL

## 2021-10-19 NOTE — PROGRESS NOTES
This is a recent snapshot of the patient's [facility] medical record.  For current drug dose and complete information and questions, call [phone] or In Basket pool, fv home infusion ([id number])  CSN Number:  [id number]

## 2021-10-20 NOTE — PROGRESS NOTES
This is a recent snapshot of the patient's [facility] medical record.  For current drug dose and complete information and questions, call [phone] or In Basket pool, fv home infusion ([id number])  CSN Number:  [id number]

## 2021-10-21 ENCOUNTER — HOME INFUSION (PRE-WILLOW HOME INFUSION) (OUTPATIENT)
Dept: PHARMACY | Facility: CLINIC | Age: 21
End: 2021-10-21

## 2021-10-21 ENCOUNTER — PATIENT OUTREACH (OUTPATIENT)
Dept: GASTROENTEROLOGY | Facility: CLINIC | Age: 21
End: 2021-10-21

## 2021-10-21 DIAGNOSIS — K51.00 ULCERATIVE PANCOLITIS WITHOUT COMPLICATION (H): Primary | ICD-10-CM

## 2021-10-21 RX ORDER — NALOXONE HYDROCHLORIDE 0.4 MG/ML
0.2 INJECTION, SOLUTION INTRAMUSCULAR; INTRAVENOUS; SUBCUTANEOUS
Status: CANCELLED | OUTPATIENT
Start: 2022-01-14

## 2021-10-21 RX ORDER — EPINEPHRINE 1 MG/ML
0.3 INJECTION, SOLUTION, CONCENTRATE INTRAVENOUS EVERY 5 MIN PRN
Status: CANCELLED | OUTPATIENT
Start: 2022-01-14

## 2021-10-21 RX ORDER — ALBUTEROL SULFATE 90 UG/1
1-2 AEROSOL, METERED RESPIRATORY (INHALATION)
Status: CANCELLED
Start: 2022-01-14

## 2021-10-21 RX ORDER — ACETAMINOPHEN 325 MG/1
650 TABLET ORAL ONCE
Status: CANCELLED
Start: 2022-01-14 | End: 2021-10-21

## 2021-10-21 RX ORDER — DIPHENHYDRAMINE HYDROCHLORIDE 50 MG/ML
50 INJECTION INTRAMUSCULAR; INTRAVENOUS
Status: CANCELLED
Start: 2022-01-14

## 2021-10-21 RX ORDER — METHYLPREDNISOLONE SODIUM SUCCINATE 125 MG/2ML
125 INJECTION, POWDER, LYOPHILIZED, FOR SOLUTION INTRAMUSCULAR; INTRAVENOUS
Status: CANCELLED
Start: 2022-01-14

## 2021-10-21 RX ORDER — ALBUTEROL SULFATE 0.83 MG/ML
2.5 SOLUTION RESPIRATORY (INHALATION)
Status: CANCELLED | OUTPATIENT
Start: 2022-01-14

## 2021-10-21 RX ORDER — DIPHENHYDRAMINE HCL 25 MG
25 CAPSULE ORAL ONCE
Status: CANCELLED
Start: 2022-01-14 | End: 2021-10-21

## 2021-10-21 RX ORDER — MEPERIDINE HYDROCHLORIDE 25 MG/ML
25 INJECTION INTRAMUSCULAR; INTRAVENOUS; SUBCUTANEOUS EVERY 30 MIN PRN
Status: CANCELLED | OUTPATIENT
Start: 2022-01-14

## 2021-10-21 NOTE — TELEPHONE ENCOUNTER
Infliximab .  New therapy plan entered with no changes  7.5 mg/kg every 8 weeks   Standing lab orders .To be drawn day of  infusion prior to start of infusion every other infusion     Patient overdue for clinic appointment.     Contacted patient and scheduled

## 2021-10-22 ENCOUNTER — HOME INFUSION (PRE-WILLOW HOME INFUSION) (OUTPATIENT)
Dept: PHARMACY | Facility: CLINIC | Age: 21
End: 2021-10-22
Payer: COMMERCIAL

## 2021-10-22 NOTE — PROGRESS NOTES
This is a recent snapshot of the patient's [facility] medical record.  For current drug dose and complete information and questions, call [phone] or In Basket pool, fv home infusion ([id number])  CSN Number:  [id number]

## 2021-10-26 NOTE — PROGRESS NOTES
This is a recent snapshot of the patient's [facility] medical record.  For current drug dose and complete information and questions, call [phone] or In Basket pool, fv home infusion ([id number])  CSN Number:  [id number]

## 2021-10-27 ENCOUNTER — HOME INFUSION (PRE-WILLOW HOME INFUSION) (OUTPATIENT)
Dept: PHARMACY | Facility: CLINIC | Age: 21
End: 2021-10-27

## 2021-10-27 NOTE — PROGRESS NOTES
This is a recent snapshot of the patient's [facility] medical record.  For current drug dose and complete information and questions, call [phone] or In Basket pool, fv home infusion ([id number])  CSN Number:  [id number]

## 2021-10-29 NOTE — PROGRESS NOTES
This is a recent snapshot of the patient's [facility] medical record.  For current drug dose and complete information and questions, call [phone] or In Basket pool, fv home infusion ([id number])  CSN Number:  [id number]

## 2021-11-17 ENCOUNTER — PATIENT OUTREACH (OUTPATIENT)
Dept: GASTROENTEROLOGY | Facility: CLINIC | Age: 21
End: 2021-11-17
Payer: COMMERCIAL

## 2021-11-18 NOTE — PROGRESS NOTES
Called Jae and informed about the scheduling conflict patient available for a 10:40 appt on Friday November 18th

## 2021-11-19 ENCOUNTER — VIRTUAL VISIT (OUTPATIENT)
Dept: GASTROENTEROLOGY | Facility: CLINIC | Age: 21
End: 2021-11-19
Payer: COMMERCIAL

## 2021-11-19 DIAGNOSIS — K51.00 ULCERATIVE PANCOLITIS (H): Primary | ICD-10-CM

## 2021-11-19 PROCEDURE — 99214 OFFICE O/P EST MOD 30 MIN: CPT | Mod: 95 | Performed by: INTERNAL MEDICINE

## 2021-11-19 NOTE — PROGRESS NOTES
This is a recent snapshot of the patient's [facility] medical record.  For current drug dose and complete information and questions, call [phone] or In Basket pool, fv home infusion ([id number])  CSN Number:  [id number]

## 2021-11-19 NOTE — PROGRESS NOTES
Jae is a 21 year old who is being evaluated via a billable video visit.      How would you like to obtain your AVS? MyChart  If the video visit is dropped, the invitation should be resent by: Text to cell phone: [phone]  Will anyone else be joining your video visit? No      Video Start Time: 10:43 AM    Video-Visit Details    Type of service:  Video Visit    Video End Time: 11:00 AM    Originating Location (pt. Location): Home    Distant Location (provider location):  [facility] [place]     Platform used for Video Visit: [email or web address]       [facility] UC follow up       PATIENT: Jae Sparks    MRN: [id number]    Date of Birth 2000    Tel: [phone] (home)     PCP: No primary care provider on file.     HPI: Mr. Sparks is a 20 year old year old male here to establish care for ulcerative pancolitis.     UC history    Jae was diagnosed with pan-UC in late 2017. He was started on prednisone and Humira 8/2017. He felt somewhat better on Humira but lost response about a week with continued symptoms. Trough level in February 2018 -undetectable Humira level without antibodies and dose changed to weekly 2/2018.     Could not continue with self injections of Humira, so switched to Inflectra May 2020 (Dr. Landeros,  in [place]). Dose increased to 7.5 mg/kg q8 w when level was low (4.58, 0 ATIs) and FC was elevated at 441.     Last inflectra dose was 8 weeks ago.      Macroscopic extent of disease (most recent) E3    Current UC symptoms    Bowel frequency in day 3 (in the morning)   Bowel frequency in night 0  Urgency of defecation occasional  Blood in stool none  General well being 0 = very well  Extracolonic features (multiple select) none     Constitutional symptoms:  Fever NO  Weight loss NO    Noteworthy diet history- no dairy, low fiber     Other GI symptoms present none    Total number of IBD surgeries (except perianal): 0    Current IBD  Medications:  Inflectra    Past IBD Medications:   Prednisone  Humira      Interval history, 12/2020  Doing well. Next inflectra on 1/18/2021. No breakthrough symptoms.     Current UC symptoms    Bowel frequency in day 2-3   Bowel frequency in night 0  Urgency of defecation occasional  Blood in stool none  General well being 0 = very well  Extracolonic features (multiple select) none     Interval history, 11/2021  A couple days prior to inflectra may feel some urgency.       Current UC symptoms    Bowel frequency in day 1-3   Bowel frequency in night 0  Urgency of defecation occasional  Blood in stool none  General well being 0 = very well  Extracolonic features (multiple select) none     Past Medical History:   Diagnosis Date     Uncomplicated asthma         Past Surgical History:   Procedure Laterality Date     NO HISTORY OF SURGERY         Social History     Tobacco Use     Smoking status: Never Smoker     Smokeless tobacco: Never Used   Substance Use Topics     Alcohol use: Not on file       Family History   Problem Relation Age of Onset     Inflammatory Bowel Disease No family hx of      Autoimmune Disease No family hx of      Colon Cancer No family hx of        Allergies   Allergen Reactions     Pollen Extract Itching     Cantaloupe (Diagnostic) Hives and Itching        Outpatient Encounter Medications as of 11/19/2021   Medication Sig Dispense Refill     albuterol (PROAIR HFA/PROVENTIL HFA/VENTOLIN HFA) 108 (90 Base) MCG/ACT inhaler        fexofenadine (ALLEGRA) 180 MG tablet Take 180 mg by mouth daily as needed for allergies (in spring and summer)       omeprazole (PRILOSEC) 40 MG DR capsule Take 40 mg by mouth daily as needed        No facility-administered encounter medications on file as of 11/19/2021.     NSAID  NO    Review of Systems  Complete 10 System ROS performed. All are negative except as documented below, in the HPI, or in patient questionnaire from today's visit.    1) Constitutional: No  fevers, chills, night sweats or malaise, weight loss or gain  2) Skin: No rash  3) Pulmonary: No wheeze, SOB, cough, sputum or hemoptysis  4) Cardiovascular: No Chest pain or palpitations  5) Genitourinary: No blood in urine or dysuria  6) Endocrine: No increased sweating, hunger, thirst or thyroid problems  7) Hematologic: No bruising and easy bleeding  8) Musculoskeletal: no new pain in joints or limitation in ROM  9) Neurologic: No dizziness, paresthesias or weakness or falls  10) Psychiatric:  not depressed/anxious, no sleep problems    PHYSICAL EXAM  Vitals: There were no vitals taken for this visit.    No Pain (0)     General appearance  Healthy appearing adult, in no acute distress     Eyes  Sclera anicteric  Pupils round and reactive to light     Ears, nose, mouth and throat  No obvious external lesions of ears and nose  Hearing intact     Neck  Symmetric  No obvious external lesions     Respiratory  Normal respiration, no use of accessory muscles      MSK  Gait normal     Skin  No rashes or jaundice      Psychiatric  Oriented to person, place and time  Appropriate mood and affect.   DATA:  Reviewed in detail past documentation, medications and prior workup available in electronic health records or through outside records.    PERTINENT STUDIES:  Tuberculosis: QFN neg 12/2019  HBV serology neg in 1/2018    DRUG MONITORING  Biologic concentration: IFX trough 4.58 (ATIs were not tested, given presence of drug level)    5/16/2016 Flex sig: Signs of colonic inflammation identified in the left colon predominantly, likely beyond  Bx - mild active colitis, favored to be a resolving or self limited infectious colitis    7/10/2017 Flex sig: Moderate colitis from rectum to 40cm consistent with ulcerative colitis.  Bx - chronic colitis, moderately active.  Treated with lialda, an ti-TNF agent discussed but not started.    8/2/2017 CT Abdomen/Pelvis: ? Acute pyelonephritis, diffuse colon wall thickening.    8/4/2017  Sigmoidoscopy: Punched out deep ulceration/edema/erythema up to 70 cm. Biopsies take from mid-descending, distal descending, proximal sigmoid, distal sigmoid, and rectum.    6/7/2018 Colonoscopy for IBD disease assessment:  Scarring seen throughout the colon c/w healed ulcerative colitis. Scattered pseudopolyps seen throughout the colon, most marked in the ascending colon and cecum. Mild pinpoint erosions were seen in the terminal ileum. Random biopsies were taken throughout.   Endoscopic examination was performed to the Terminal ileum. This was for assessment of ulcerative colitis disease activity.  The Lee score for each colonic segment is below:  Rectum: 0 (normal) - normal path  Sigmoid colon: 0 (normal) - normal path  Descending colon: 0 (normal) - normal path  Transverse colon: 0 (normal) - normal path  Ascending colon: 0 (normal) - focal active colitis  Cecum: 0 (normal) - normal path  Terminal ileum: Mild pinpoint erosions. - focal active ileitis - ?medication or prep effect  Based on your overall colonoscopy findings, you have colitis that is Completely healed (Lee 0), though there is possible mild terminal ileitis.       IMPRESSION:    DIAGNOSIS:  # E3 UC in clinical remission on inflectra 7.5 mg/kg q8w with possible breakthrough symptoms    Mr. Sparks is a 20 year old here with E3 UC in deep remission (icscope in 2018 showed Lee 0) on Inflectra 7.5 mg/kg q8w. His dose was increased from 5 mg/kg given FC of 404 in 7/28/2020.  He continues to do well and FC was normal in 10/2020.  We will plan for the following:    PLAN:  ---Continue inflectra infusion at 7.5 mg/kg q8 weeks  PLAN  ---Check a trough level at time of next infusion (12/20), home infusion   ---Check fecal calprotectin (to be mailed)  ---Continue inflectra at the current dose unless fecal yun comes back elevated or trough level is lower than 5 (which was the last trough level in 7/2020)  ---Jae will follow up with Janet Hernandez re:  [facility]  ---Jae will schedule a derm visit when COVID cases decrease in [place]   ---Establish care with a PCP    IBD Health Care Maintenance:  ---See dermatology for a FBSE when able as above  ---Greatly appreciate [facility] pharmacy visit for healthcare maintenance, as above. Recommend repeat HBV series.    ---Colon cancer screening to start in 2025    Misc:  -- Avoid tobacco use  -- Avoid NSAIDs as there is potentially a 25% chance of causing an IBD flare    30 minutes spent on the date of the encounter performing chart review, history and exam, documentation and further activities as noted above.    Return in about 7 months (around 6/19/2022).    Jigna Eng MD   of Medicine  Division of Gastroenterology, Hepatology and Nutrition  [facility]

## 2021-11-19 NOTE — LETTER
11/19/2021         RE: Jae Sparks  [address]  [place] [address]        Dear Colleague,    Thank you for referring your patient, Jae Sparks, to the [facility]STROENTEROLOG[facility] [place]. Please see a copy of my visit note below.    Jae is a 21 year old who is being evaluated via a billable video visit.      How would you like to obtain your AVS? MyChart  If the video visit is dropped, the invitation should be resent by: Text to cell phone: [phone]  Will anyone else be joining your video visit? No      Video Start Time: 10:43 AM    Video-Visit Details    Type of service:  Video Visit    Video End Time: 11:00 AM    Originating Location (pt. Location): Home    Distant Location (provider location):  [facility] [place]     Platform used for Video Visit: [email or web address]       [facility] UC follow up       PATIENT: Jae Sparks    MRN: [id number]    Date of Birth 2000    Tel: [phone] (home)     PCP: No primary care provider on file.     HPI: Mr. Sparks is a 20 year old year old male here to establish care for ulcerative pancolitis.     UC history    Jae was diagnosed with pan-UC in late 2017. He was started on prednisone and Humira 8/2017. He felt somewhat better on Humira but lost response about a week with continued symptoms. Trough level in February 2018 -undetectable Humira level without antibodies and dose changed to weekly 2/2018.     Could not continue with self injections of Humira, so switched to Inflectra May 2020 (Dr. Landeros,  in [place]). Dose increased to 7.5 mg/kg q8 w when level was low (4.58, 0 ATIs) and FC was elevated at 441.     Last inflectra dose was 8 weeks ago.      Macroscopic extent of disease (most recent) E3    Current UC symptoms    Bowel frequency in day 3 (in the morning)   Bowel frequency in night 0  Urgency of defecation occasional  Blood in stool none  General well being 0 = very  well  Extracolonic features (multiple select) none     Constitutional symptoms:  Fever NO  Weight loss NO    Noteworthy diet history- no dairy, low fiber     Other GI symptoms present none    Total number of IBD surgeries (except perianal): 0    Current IBD Medications:  Inflectra    Past IBD Medications:   Prednisone  Humira      Interval history, 12/2020  Doing well. Next inflectra on 1/18/2021. No breakthrough symptoms.     Current UC symptoms    Bowel frequency in day 2-3   Bowel frequency in night 0  Urgency of defecation occasional  Blood in stool none  General well being 0 = very well  Extracolonic features (multiple select) none     Interval history, 11/2021  A couple days prior to inflectra may feel some urgency.       Current UC symptoms    Bowel frequency in day 1-3   Bowel frequency in night 0  Urgency of defecation occasional  Blood in stool none  General well being 0 = very well  Extracolonic features (multiple select) none     Past Medical History:   Diagnosis Date     Uncomplicated asthma         Past Surgical History:   Procedure Laterality Date     NO HISTORY OF SURGERY         Social History     Tobacco Use     Smoking status: Never Smoker     Smokeless tobacco: Never Used   Substance Use Topics     Alcohol use: Not on file       Family History   Problem Relation Age of Onset     Inflammatory Bowel Disease No family hx of      Autoimmune Disease No family hx of      Colon Cancer No family hx of        Allergies   Allergen Reactions     Pollen Extract Itching     Cantaloupe (Diagnostic) Hives and Itching        Outpatient Encounter Medications as of 11/19/2021   Medication Sig Dispense Refill     albuterol (PROAIR HFA/PROVENTIL HFA/VENTOLIN HFA) 108 (90 Base) MCG/ACT inhaler        fexofenadine (ALLEGRA) 180 MG tablet Take 180 mg by mouth daily as needed for allergies (in spring and summer)       omeprazole (PRILOSEC) 40 MG DR capsule Take 40 mg by mouth daily as needed        No  facility-administered encounter medications on file as of 11/19/2021.     NSAID  NO    Review of Systems  Complete 10 System ROS performed. All are negative except as documented below, in the HPI, or in patient questionnaire from today's visit.    1) Constitutional: No fevers, chills, night sweats or malaise, weight loss or gain  2) Skin: No rash  3) Pulmonary: No wheeze, SOB, cough, sputum or hemoptysis  4) Cardiovascular: No Chest pain or palpitations  5) Genitourinary: No blood in urine or dysuria  6) Endocrine: No increased sweating, hunger, thirst or thyroid problems  7) Hematologic: No bruising and easy bleeding  8) Musculoskeletal: no new pain in joints or limitation in ROM  9) Neurologic: No dizziness, paresthesias or weakness or falls  10) Psychiatric:  not depressed/anxious, no sleep problems    PHYSICAL EXAM  Vitals: There were no vitals taken for this visit.    No Pain (0)     General appearance  Healthy appearing adult, in no acute distress     Eyes  Sclera anicteric  Pupils round and reactive to light     Ears, nose, mouth and throat  No obvious external lesions of ears and nose  Hearing intact     Neck  Symmetric  No obvious external lesions     Respiratory  Normal respiration, no use of accessory muscles      MSK  Gait normal     Skin  No rashes or jaundice      Psychiatric  Oriented to person, place and time  Appropriate mood and affect.   DATA:  Reviewed in detail past documentation, medications and prior workup available in electronic health records or through outside records.    PERTINENT STUDIES:  Tuberculosis: QFN neg 12/2019  HBV serology neg in 1/2018    DRUG MONITORING  Biologic concentration: IFX trough 4.58 (ATIs were not tested, given presence of drug level)    5/16/2016 Flex sig: Signs of colonic inflammation identified in the left colon predominantly, likely beyond  Bx - mild active colitis, favored to be a resolving or self limited infectious colitis    7/10/2017 Flex sig: Moderate  colitis from rectum to 40cm consistent with ulcerative colitis.  Bx - chronic colitis, moderately active.  Treated with lialda, an ti-TNF agent discussed but not started.    8/2/2017 CT Abdomen/Pelvis: ? Acute pyelonephritis, diffuse colon wall thickening.    8/4/2017 Sigmoidoscopy: Punched out deep ulceration/edema/erythema up to 70 cm. Biopsies take from mid-descending, distal descending, proximal sigmoid, distal sigmoid, and rectum.    6/7/2018 Colonoscopy for IBD disease assessment:  Scarring seen throughout the colon c/w healed ulcerative colitis. Scattered pseudopolyps seen throughout the colon, most marked in the ascending colon and cecum. Mild pinpoint erosions were seen in the terminal ileum. Random biopsies were taken throughout.   Endoscopic examination was performed to the Terminal ileum. This was for assessment of ulcerative colitis disease activity.  The Lee score for each colonic segment is below:  Rectum: 0 (normal) - normal path  Sigmoid colon: 0 (normal) - normal path  Descending colon: 0 (normal) - normal path  Transverse colon: 0 (normal) - normal path  Ascending colon: 0 (normal) - focal active colitis  Cecum: 0 (normal) - normal path  Terminal ileum: Mild pinpoint erosions. - focal active ileitis - ?medication or prep effect  Based on your overall colonoscopy findings, you have colitis that is Completely healed (Lee 0), though there is possible mild terminal ileitis.       IMPRESSION:    DIAGNOSIS:  # E3 UC in clinical remission on inflectra 7.5 mg/kg q8w with possible breakthrough symptoms    Mr. Sparks is a 20 year old here with E3 UC in deep remission (icscope in 2018 showed Lee 0) on Inflectra 7.5 mg/kg q8w. His dose was increased from 5 mg/kg given FC of 404 in 7/28/2020.  He continues to do well and FC was normal in 10/2020.  We will plan for the following:    PLAN:  ---Continue inflectra infusion at 7.5 mg/kg q8 weeks  PLAN  ---Check a trough level at time of next infusion (12/20),  home infusion   ---Check fecal calprotectin (to be mailed)  ---Continue inflectra at the current dose unless fecal yun comes back elevated or trough level is lower than 5 (which was the last trough level in 7/2020)  ---Jae will follow up with Janet Hernandez re: [facility]  ---Jae will schedule a derm visit when COVID cases decrease in [place]   ---Establish care with a PCP    IBD Health Care Maintenance:  ---See dermatology for a FBSE when able as above  ---Greatly appreciate [facility] pharmacy visit for healthcare maintenance, as above. Recommend repeat HBV series.    ---Colon cancer screening to start in 2025    Misc:  -- Avoid tobacco use  -- Avoid NSAIDs as there is potentially a 25% chance of causing an IBD flare    30 minutes spent on the date of the encounter performing chart review, history and exam, documentation and further activities as noted above.    Return in about 7 months (around 6/19/2022).    Jigna Eng MD   of Medicine  Division of Gastroenterology, Hepatology and Nutrition  [facility]

## 2021-12-16 ENCOUNTER — HOME INFUSION (PRE-WILLOW HOME INFUSION) (OUTPATIENT)
Dept: PHARMACY | Facility: CLINIC | Age: 21
End: 2021-12-16
Payer: COMMERCIAL

## 2021-12-17 ENCOUNTER — HOME INFUSION (PRE-WILLOW HOME INFUSION) (OUTPATIENT)
Dept: PHARMACY | Facility: CLINIC | Age: 21
End: 2021-12-17
Payer: COMMERCIAL

## 2021-12-20 ENCOUNTER — LAB REQUISITION (OUTPATIENT)
Dept: LAB | Facility: CLINIC | Age: 21
End: 2021-12-20
Payer: COMMERCIAL

## 2021-12-20 ENCOUNTER — MEDICAL CORRESPONDENCE (OUTPATIENT)
Dept: HEALTH INFORMATION MANAGEMENT | Facility: CLINIC | Age: 21
End: 2021-12-20

## 2021-12-20 ENCOUNTER — PATIENT OUTREACH (OUTPATIENT)
Dept: GASTROENTEROLOGY | Facility: CLINIC | Age: 21
End: 2021-12-20

## 2021-12-20 ENCOUNTER — HOME INFUSION (PRE-WILLOW HOME INFUSION) (OUTPATIENT)
Dept: PHARMACY | Facility: CLINIC | Age: 21
End: 2021-12-20
Payer: COMMERCIAL

## 2021-12-20 DIAGNOSIS — K51.00 ULCERATIVE PANCOLITIS (H): Primary | ICD-10-CM

## 2021-12-20 DIAGNOSIS — K51.90 ULCERATIVE COLITIS, UNSPECIFIED, WITHOUT COMPLICATIONS (H): ICD-10-CM

## 2021-12-20 LAB
ALBUMIN SERPL-MCNC: 3.9 G/DL (ref 3.4–5)
ALP SERPL-CCNC: 68 U/L (ref 40–150)
ALT SERPL W P-5'-P-CCNC: 98 U/L (ref 0–70)
AST SERPL W P-5'-P-CCNC: 36 U/L (ref 0–45)
BASOPHILS # BLD AUTO: 0 10E3/UL (ref 0–0.2)
BASOPHILS NFR BLD AUTO: 1 %
BILIRUB DIRECT SERPL-MCNC: 0.2 MG/DL (ref 0–0.2)
BILIRUB SERPL-MCNC: 0.8 MG/DL (ref 0.2–1.3)
CRP SERPL-MCNC: 4.3 MG/L (ref 0–8)
EOSINOPHIL # BLD AUTO: 0.2 10E3/UL (ref 0–0.7)
EOSINOPHIL NFR BLD AUTO: 2 %
ERYTHROCYTE [DISTWIDTH] IN BLOOD BY AUTOMATED COUNT: 13.3 % (ref 10–15)
ERYTHROCYTE [SEDIMENTATION RATE] IN BLOOD BY WESTERGREN METHOD: 5 MM/HR (ref 0–15)
HCT VFR BLD AUTO: 51.9 % (ref 40–53)
HGB BLD-MCNC: 17.4 G/DL (ref 13.3–17.7)
IMM GRANULOCYTES # BLD: 0 10E3/UL
IMM GRANULOCYTES NFR BLD: 0 %
LYMPHOCYTES # BLD AUTO: 2.5 10E3/UL (ref 0.8–5.3)
LYMPHOCYTES NFR BLD AUTO: 31 %
MCH RBC QN AUTO: 30.2 PG (ref 26.5–33)
MCHC RBC AUTO-ENTMCNC: 33.5 G/DL (ref 31.5–36.5)
MCV RBC AUTO: 90 FL (ref 78–100)
MONOCYTES # BLD AUTO: 0.6 10E3/UL (ref 0–1.3)
MONOCYTES NFR BLD AUTO: 8 %
NEUTROPHILS # BLD AUTO: 4.6 10E3/UL (ref 1.6–8.3)
NEUTROPHILS NFR BLD AUTO: 58 %
NRBC # BLD AUTO: 0 10E3/UL
NRBC BLD AUTO-RTO: 0 /100
PLATELET # BLD AUTO: 293 10E3/UL (ref 150–450)
PROT SERPL-MCNC: 8.1 G/DL (ref 6.8–8.8)
RBC # BLD AUTO: 5.77 10E6/UL (ref 4.4–5.9)
WBC # BLD AUTO: 8 10E3/UL (ref 4–11)

## 2021-12-20 PROCEDURE — 85652 RBC SED RATE AUTOMATED: CPT | Performed by: INTERNAL MEDICINE

## 2021-12-20 PROCEDURE — 86140 C-REACTIVE PROTEIN: CPT | Performed by: INTERNAL MEDICINE

## 2021-12-20 PROCEDURE — 80076 HEPATIC FUNCTION PANEL: CPT | Performed by: INTERNAL MEDICINE

## 2021-12-20 PROCEDURE — 85025 COMPLETE CBC W/AUTO DIFF WBC: CPT | Performed by: INTERNAL MEDICINE

## 2021-12-27 LAB — SCANNED LAB RESULT: NORMAL

## 2021-12-29 DIAGNOSIS — K51.90 ULCERATIVE COLITIS (H): Primary | ICD-10-CM

## 2021-12-29 NOTE — PROGRESS NOTES
Order placed or the repeat hepatic panel         Jigna Eng MD Koch, Noelle, MICHELLE  Hi N,   Can you please arrange for a repeat hepatic panel in 2 weeks?   Thanks,   E

## 2022-01-14 ENCOUNTER — PATIENT OUTREACH (OUTPATIENT)
Dept: GASTROENTEROLOGY | Facility: CLINIC | Age: 22
End: 2022-01-14

## 2022-01-14 ENCOUNTER — LAB (OUTPATIENT)
Dept: LAB | Facility: CLINIC | Age: 22
End: 2022-01-14
Payer: COMMERCIAL

## 2022-01-14 DIAGNOSIS — K51.90 ULCERATIVE COLITIS (H): ICD-10-CM

## 2022-01-14 DIAGNOSIS — R79.89 ELEVATED LFTS: ICD-10-CM

## 2022-01-14 PROCEDURE — 86709 HEPATITIS A IGM ANTIBODY: CPT

## 2022-01-14 PROCEDURE — 86704 HEP B CORE ANTIBODY TOTAL: CPT

## 2022-01-14 PROCEDURE — 80076 HEPATIC FUNCTION PANEL: CPT

## 2022-01-14 PROCEDURE — 86706 HEP B SURFACE ANTIBODY: CPT

## 2022-01-14 PROCEDURE — 36415 COLL VENOUS BLD VENIPUNCTURE: CPT

## 2022-01-14 PROCEDURE — 87340 HEPATITIS B SURFACE AG IA: CPT

## 2022-01-14 PROCEDURE — 84443 ASSAY THYROID STIM HORMONE: CPT

## 2022-01-14 NOTE — PROGRESS NOTES
Jigna Eng MD   1/11/2022  5:40 PM CST Back to Top        Remicade level is only 0.7! Can we please get a PA to increase to 10 mg/kg every 4 weeks?       patient therapy plan updated to 10 mg/kg every 4 week dosing for Remicade.  Will updated home infusion patient aware as he was updated per message by Dr Eng

## 2022-01-15 ENCOUNTER — MYC MEDICAL ADVICE (OUTPATIENT)
Dept: GASTROENTEROLOGY | Facility: CLINIC | Age: 22
End: 2022-01-15
Payer: COMMERCIAL

## 2022-01-15 LAB
ALBUMIN SERPL-MCNC: 3.8 G/DL (ref 3.4–5)
ALP SERPL-CCNC: 60 U/L (ref 40–150)
ALT SERPL W P-5'-P-CCNC: 239 U/L (ref 0–70)
AST SERPL W P-5'-P-CCNC: 79 U/L (ref 0–45)
BILIRUB DIRECT SERPL-MCNC: 0.2 MG/DL (ref 0–0.2)
BILIRUB SERPL-MCNC: 0.9 MG/DL (ref 0.2–1.3)
PROT SERPL-MCNC: 7.6 G/DL (ref 6.8–8.8)

## 2022-01-19 DIAGNOSIS — R79.89 ELEVATED LFTS: Primary | ICD-10-CM

## 2022-01-19 LAB
HAV IGM SERPL QL IA: NONREACTIVE
HBV CORE AB SERPL QL IA: NONREACTIVE
HBV SURFACE AB SERPL IA-ACNC: 5.86 M[IU]/ML
HBV SURFACE AG SERPL QL IA: NONREACTIVE
TSH SERPL DL<=0.005 MIU/L-ACNC: 1.61 MU/L (ref 0.4–4)

## 2022-01-20 ENCOUNTER — ANCILLARY PROCEDURE (OUTPATIENT)
Dept: ULTRASOUND IMAGING | Facility: CLINIC | Age: 22
End: 2022-01-20
Attending: INTERNAL MEDICINE
Payer: COMMERCIAL

## 2022-01-20 DIAGNOSIS — R79.89 ELEVATED LFTS: ICD-10-CM

## 2022-01-20 PROCEDURE — 93975 VASCULAR STUDY: CPT | Performed by: RADIOLOGY

## 2022-01-24 ENCOUNTER — HOME INFUSION (PRE-WILLOW HOME INFUSION) (OUTPATIENT)
Dept: PHARMACY | Facility: CLINIC | Age: 22
End: 2022-01-24
Payer: COMMERCIAL

## 2022-01-27 ENCOUNTER — PATIENT OUTREACH (OUTPATIENT)
Dept: GASTROENTEROLOGY | Facility: CLINIC | Age: 22
End: 2022-01-27
Payer: COMMERCIAL

## 2022-01-27 NOTE — PROGRESS NOTES
Called the patient and discussed the labs that he needed to have drawn due to his liver levels patient states understanding and will have labs completed next week.  Liver ultrasound normal

## 2022-01-30 ENCOUNTER — HEALTH MAINTENANCE LETTER (OUTPATIENT)
Age: 22
End: 2022-01-30

## 2022-01-31 ENCOUNTER — LAB (OUTPATIENT)
Dept: LAB | Facility: CLINIC | Age: 22
End: 2022-01-31
Payer: COMMERCIAL

## 2022-01-31 ENCOUNTER — HOME INFUSION (PRE-WILLOW HOME INFUSION) (OUTPATIENT)
Dept: PHARMACY | Facility: CLINIC | Age: 22
End: 2022-01-31

## 2022-01-31 DIAGNOSIS — R79.89 ELEVATED LFTS: ICD-10-CM

## 2022-01-31 LAB — MONOCYTES NFR BLD AUTO: NEGATIVE %

## 2022-01-31 PROCEDURE — 36415 COLL VENOUS BLD VENIPUNCTURE: CPT

## 2022-01-31 PROCEDURE — 86308 HETEROPHILE ANTIBODY SCREEN: CPT

## 2022-01-31 PROCEDURE — 86038 ANTINUCLEAR ANTIBODIES: CPT

## 2022-01-31 PROCEDURE — 86015 ACTIN ANTIBODY EACH: CPT | Mod: 90

## 2022-01-31 PROCEDURE — 99000 SPECIMEN HANDLING OFFICE-LAB: CPT

## 2022-01-31 PROCEDURE — 86039 ANTINUCLEAR ANTIBODIES (ANA): CPT

## 2022-01-31 PROCEDURE — 86376 MICROSOMAL ANTIBODY EACH: CPT | Mod: 90

## 2022-01-31 PROCEDURE — 86364 TISS TRNSGLTMNASE EA IG CLAS: CPT

## 2022-01-31 PROCEDURE — 87522 HEPATITIS C REVRS TRNSCRPJ: CPT

## 2022-01-31 NOTE — TELEPHONE ENCOUNTER
I spoke to the patient last week and informed him of the plan see encounter.  Closing out my chart message

## 2022-02-01 LAB
ANA PAT SER IF-IMP: ABNORMAL
ANA SER QL IF: POSITIVE
ANA TITR SER IF: ABNORMAL {TITER}
TTG IGA SER-ACNC: 1.5 U/ML
TTG IGG SER-ACNC: 1.2 U/ML

## 2022-02-02 ENCOUNTER — HOME INFUSION (PRE-WILLOW HOME INFUSION) (OUTPATIENT)
Dept: PHARMACY | Facility: CLINIC | Age: 22
End: 2022-02-02
Payer: COMMERCIAL

## 2022-02-02 LAB
HCV RNA SERPL NAA+PROBE-ACNC: NOT DETECTED IU/ML
SMA IGG SER-ACNC: 7 UNITS

## 2022-02-11 NOTE — PROGRESS NOTES
This is a recent snapshot of the patient's [facility] medical record.  For current drug dose and complete information and questions, call [phone] or In Basket pool, [facility]v home infusion ([id number])  CSN Number:  [id number]

## 2022-02-15 NOTE — PROGRESS NOTES
SUBJECTIVE:   CC: Jae Sparks is an 21 year old male who presents for preventative health visit.       Patient has been advised of split billing requirements and indicates understanding: Yes  Healthy Habits:     Getting at least 3 servings of Calcium per day:  NO    Bi-annual eye exam:  Yes    Dental care twice a year:  NO    Sleep apnea or symptoms of sleep apnea:  None    Diet:  Other    Frequency of exercise:  2-3 days/week    Duration of exercise:  15-30 minutes    Taking medications regularly:  Yes    Medication side effects:  None    PHQ-2 Total Score: 0    Additional concerns today:  Yes              Today's PHQ-2 Score:   PHQ-2 ( 1999 [employer]) 2/17/2022   Q1: Little interest or pleasure in doing things 0   Q2: Feeling down, depressed or hopeless 0   PHQ-2 Score 0   PHQ-2 Total Score (12-17 Years)- Positive if 3 or more points; Administer PHQ-A if positive -   Q1: Little interest or pleasure in doing things Not at all   Q2: Feeling down, depressed or hopeless Not at all   PHQ-2 Score 0       Abuse: Current or Past(Physical, Sexual or Emotional)- No  Do you feel safe in your environment? Yes    Have you ever done Advance Care Planning? (For example, a Health Directive, POLST, or a discussion with a medical provider or your loved ones about your wishes): No, advance care planning information given to patient to review.  Patient declined advance care planning discussion at this time.    Social History     Tobacco Use     Smoking status: Never Smoker     Smokeless tobacco: Never Used   Substance Use Topics     Alcohol use: Not Currently     If you drink alcohol do you typically have >3 drinks per day or >7 drinks per week? No    Alcohol Use 2/17/2022   Prescreen: >3 drinks/day or >7 drinks/week? No   Prescreen: >3 drinks/day or >7 drinks/week? -   No flowsheet data found.    Last PSA: No results found for: PSA    Reviewed orders with patient. Reviewed health maintenance and updated orders accordingly -  "Yes  BP Readings from Last 3 Encounters:   02/17/22 (!) 160/70    Wt Readings from Last 3 Encounters:   02/17/22 84.7 kg (186 lb 12.8 oz)   12/29/20 79.4 kg (175 lb)   09/23/20 80.3 kg (177 lb)                    Reviewed and updated as needed this visit by clinical staff   Tobacco  Allergies  Meds   Med Hx  Surg Hx  Fam Hx  Soc Hx        Reviewed and updated as needed this visit by Provider                     Review of Systems   Constitutional: Negative for chills and fever.   HENT: Negative for congestion, ear pain, hearing loss and sore throat.    Eyes: Negative for pain and visual disturbance.   Respiratory: Negative for cough and shortness of breath.    Cardiovascular: Negative for chest pain, palpitations and peripheral edema.   Gastrointestinal: Negative for abdominal pain, constipation, diarrhea, heartburn, hematochezia and nausea.   Genitourinary: Negative for dysuria, frequency, genital sores, hematuria, impotence, penile discharge and urgency.   Musculoskeletal: Negative for arthralgias, joint swelling and myalgias.   Skin: Negative for rash.   Neurological: Negative for dizziness, weakness, headaches and paresthesias.   Psychiatric/Behavioral: Negative for mood changes. The patient is not nervous/anxious.          OBJECTIVE:   BP (!) 160/70   Pulse 63   Temp 97.7  F (36.5  C)   Ht 1.67 m (5' 5.75\")   Wt 84.7 kg (186 lb 12.8 oz)   SpO2 99%   BMI 30.38 kg/m      Physical Exam  GENERAL: alert and no distress  EYES: Eyes grossly normal to inspection, PERRL and conjunctivae and sclerae normal  HENT: ear canals and TM's normal, nose and mouth without ulcers or lesions  NECK: no adenopathy, no asymmetry, masses, or scars and thyroid normal to palpation  RESP: lungs clear to auscultation - no rales, rhonchi or wheezes  CV: regular rate and rhythm, normal S1 S2, no S3 or S4, no murmur, click or rub, no peripheral edema and peripheral pulses strong  ABDOMEN: soft, nontender, no hepatosplenomegaly, no " "masses and bowel sounds normal  MS: no gross musculoskeletal defects noted, no edema  SKIN: no suspicious lesions or rashes  NEURO: Normal strength and tone, mentation intact and speech normal  PSYCH: mentation appears normal, affect normal/bright    Diagnostic Test Results:  Labs reviewed in Epic    ASSESSMENT/PLAN:   (Z00.00) Routine general medical examination at a health care facility  (primary encounter diagnosis)  Comment: overall, doing well.BP up a bit, will have him recheck at his GI visits  Plan:     (J45.20) Mild intermittent asthma, unspecified whether complicated  Comment: doing well, rare albuterol use  Plan:     (K51.00) Ulcerative pancolitis without complication (H)  Comment: stable, working with GI on remicade infusions.  Plan:     Eleavated LFTs  Has been working with GI, recent US normal.  He is not sure what follow up is at this point, I will help reach out to GI.    COUNSELING:   Reviewed preventive health counseling, as reflected in patient instructions       Regular exercise       Healthy diet/nutrition    Estimated body mass index is 30.38 kg/m  as calculated from the following:    Height as of this encounter: 1.67 m (5' 5.75\").    Weight as of this encounter: 84.7 kg (186 lb 12.8 oz).     Weight management plan: Discussed healthy diet and exercise guidelines    He reports that he has never smoked. He has never used smokeless tobacco.      Counseling Resources:  ATP IV Guidelines  Pooled Cohorts Equation Calculator  FRAX Risk Assessment  ICSI Preventive Guidelines  Dietary Guidelines for Americans, 2010  USDA's MyPlate  ASA Prophylaxis  Lung CA Screening    ROYAL Ann [facility]  "

## 2022-02-17 ENCOUNTER — OFFICE VISIT (OUTPATIENT)
Dept: FAMILY MEDICINE | Facility: CLINIC | Age: 22
End: 2022-02-17
Payer: COMMERCIAL

## 2022-02-17 VITALS
SYSTOLIC BLOOD PRESSURE: 160 MMHG | WEIGHT: 186.8 LBS | BODY MASS INDEX: 30.02 KG/M2 | TEMPERATURE: 97.7 F | OXYGEN SATURATION: 99 % | HEIGHT: 66 IN | HEART RATE: 63 BPM | DIASTOLIC BLOOD PRESSURE: 70 MMHG

## 2022-02-17 DIAGNOSIS — K51.00 ULCERATIVE PANCOLITIS WITHOUT COMPLICATION (H): ICD-10-CM

## 2022-02-17 DIAGNOSIS — Z00.00 ROUTINE GENERAL MEDICAL EXAMINATION AT A HEALTH CARE FACILITY: Primary | ICD-10-CM

## 2022-02-17 DIAGNOSIS — J45.20 MILD INTERMITTENT ASTHMA, UNSPECIFIED WHETHER COMPLICATED: ICD-10-CM

## 2022-02-17 DIAGNOSIS — R79.89 ELEVATED LFTS: ICD-10-CM

## 2022-02-17 PROCEDURE — 99385 PREV VISIT NEW AGE 18-39: CPT | Performed by: PHYSICIAN ASSISTANT

## 2022-02-17 ASSESSMENT — ENCOUNTER SYMPTOMS
DIZZINESS: 0
PARESTHESIAS: 0
FREQUENCY: 0
HEADACHES: 0
PALPITATIONS: 0
HEARTBURN: 0
ARTHRALGIAS: 0
FEVER: 0
MYALGIAS: 0
WEAKNESS: 0
HEMATURIA: 0
NERVOUS/ANXIOUS: 0
ABDOMINAL PAIN: 0
COUGH: 0
DIARRHEA: 0
CONSTIPATION: 0
NAUSEA: 0
CHILLS: 0
DYSURIA: 0
HEMATOCHEZIA: 0
EYE PAIN: 0
JOINT SWELLING: 0
SHORTNESS OF BREATH: 0
SORE THROAT: 0

## 2022-02-17 NOTE — Clinical Note
Dr. Eng,    I met Jae today for his well exam.  He was not sure what the follow up was for his LFTs.  I told him I would reach out to you and pass along.  Thanks    Maynor Ramirez PA-C

## 2022-02-18 PROBLEM — J45.20 MILD INTERMITTENT ASTHMA, UNSPECIFIED WHETHER COMPLICATED: Status: ACTIVE | Noted: 2022-02-18

## 2022-02-18 LAB — LKM AB TITR SER IF: NORMAL {TITER}

## 2022-02-18 ASSESSMENT — ASTHMA QUESTIONNAIRES
QUESTION_4 LAST FOUR WEEKS HOW OFTEN HAVE YOU USED YOUR RESCUE INHALER OR NEBULIZER MEDICATION (SUCH AS ALBUTEROL): NOT AT ALL
ACT_TOTALSCORE: 25
ACT_TOTALSCORE: 25
QUESTION_1 LAST FOUR WEEKS HOW MUCH OF THE TIME DID YOUR ASTHMA KEEP YOU FROM GETTING AS MUCH DONE AT WORK, SCHOOL OR AT HOME: NONE OF THE TIME
ACUTE_EXACERBATION_TODAY: NO
QUESTION_3 LAST FOUR WEEKS HOW OFTEN DID YOUR ASTHMA SYMPTOMS (WHEEZING, COUGHING, SHORTNESS OF BREATH, CHEST TIGHTNESS OR PAIN) WAKE YOU UP AT NIGHT OR EARLIER THAN USUAL IN THE MORNING: NOT AT ALL
QUESTION_2 LAST FOUR WEEKS HOW OFTEN HAVE YOU HAD SHORTNESS OF BREATH: NOT AT ALL
QUESTION_5 LAST FOUR WEEKS HOW WOULD YOU RATE YOUR ASTHMA CONTROL: COMPLETELY CONTROLLED

## 2022-02-28 ENCOUNTER — HOME INFUSION (PRE-WILLOW HOME INFUSION) (OUTPATIENT)
Dept: PHARMACY | Facility: CLINIC | Age: 22
End: 2022-02-28

## 2022-02-28 ENCOUNTER — LAB (OUTPATIENT)
Dept: LAB | Facility: CLINIC | Age: 22
End: 2022-02-28
Payer: COMMERCIAL

## 2022-02-28 DIAGNOSIS — K51.00 ULCERATIVE PANCOLITIS WITHOUT COMPLICATION (H): ICD-10-CM

## 2022-02-28 PROCEDURE — 80076 HEPATIC FUNCTION PANEL: CPT

## 2022-02-28 PROCEDURE — 36415 COLL VENOUS BLD VENIPUNCTURE: CPT

## 2022-03-01 ENCOUNTER — HOME INFUSION (PRE-WILLOW HOME INFUSION) (OUTPATIENT)
Dept: PHARMACY | Facility: CLINIC | Age: 22
End: 2022-03-01

## 2022-03-01 LAB
ALBUMIN SERPL-MCNC: 4.1 G/DL (ref 3.4–5)
ALP SERPL-CCNC: 64 U/L (ref 40–150)
ALT SERPL W P-5'-P-CCNC: 78 U/L (ref 0–70)
AST SERPL W P-5'-P-CCNC: 40 U/L (ref 0–45)
BILIRUB DIRECT SERPL-MCNC: 0.2 MG/DL (ref 0–0.2)
BILIRUB SERPL-MCNC: 0.9 MG/DL (ref 0.2–1.3)
PROT SERPL-MCNC: 7.8 G/DL (ref 6.8–8.8)

## 2022-03-02 ENCOUNTER — HOME INFUSION (PRE-WILLOW HOME INFUSION) (OUTPATIENT)
Dept: PHARMACY | Facility: CLINIC | Age: 22
End: 2022-03-02

## 2022-03-28 ENCOUNTER — HOME INFUSION (PRE-WILLOW HOME INFUSION) (OUTPATIENT)
Dept: PHARMACY | Facility: CLINIC | Age: 22
End: 2022-03-28

## 2022-03-29 ENCOUNTER — HOME INFUSION (PRE-WILLOW HOME INFUSION) (OUTPATIENT)
Dept: PHARMACY | Facility: CLINIC | Age: 22
End: 2022-03-29

## 2022-03-30 ENCOUNTER — LAB REQUISITION (OUTPATIENT)
Dept: LAB | Facility: CLINIC | Age: 22
End: 2022-03-30
Payer: COMMERCIAL

## 2022-03-30 ENCOUNTER — HOME INFUSION (PRE-WILLOW HOME INFUSION) (OUTPATIENT)
Dept: PHARMACY | Facility: CLINIC | Age: 22
End: 2022-03-30

## 2022-03-30 DIAGNOSIS — K51.90 ULCERATIVE COLITIS, UNSPECIFIED, WITHOUT COMPLICATIONS (H): ICD-10-CM

## 2022-03-30 LAB
ALBUMIN SERPL-MCNC: 4.1 G/DL (ref 3.4–5)
ALP SERPL-CCNC: 65 U/L (ref 40–150)
ALT SERPL W P-5'-P-CCNC: 73 U/L (ref 0–70)
AST SERPL W P-5'-P-CCNC: 27 U/L (ref 0–45)
BASOPHILS # BLD AUTO: 0.1 10E3/UL (ref 0–0.2)
BASOPHILS NFR BLD AUTO: 1 %
BILIRUB DIRECT SERPL-MCNC: 0.2 MG/DL (ref 0–0.2)
BILIRUB SERPL-MCNC: 0.9 MG/DL (ref 0.2–1.3)
CRP SERPL-MCNC: <2.9 MG/L (ref 0–8)
EOSINOPHIL # BLD AUTO: 0.2 10E3/UL (ref 0–0.7)
EOSINOPHIL NFR BLD AUTO: 2 %
ERYTHROCYTE [DISTWIDTH] IN BLOOD BY AUTOMATED COUNT: 12.8 % (ref 10–15)
ERYTHROCYTE [SEDIMENTATION RATE] IN BLOOD BY WESTERGREN METHOD: 4 MM/HR (ref 0–15)
HCT VFR BLD AUTO: 50.8 % (ref 40–53)
HGB BLD-MCNC: 17.2 G/DL (ref 13.3–17.7)
HOLD SPECIMEN: NORMAL
IMM GRANULOCYTES # BLD: 0 10E3/UL
IMM GRANULOCYTES NFR BLD: 0 %
LYMPHOCYTES # BLD AUTO: 3.8 10E3/UL (ref 0.8–5.3)
LYMPHOCYTES NFR BLD AUTO: 39 %
MCH RBC QN AUTO: 29.9 PG (ref 26.5–33)
MCHC RBC AUTO-ENTMCNC: 33.9 G/DL (ref 31.5–36.5)
MCV RBC AUTO: 88 FL (ref 78–100)
MONOCYTES # BLD AUTO: 0.8 10E3/UL (ref 0–1.3)
MONOCYTES NFR BLD AUTO: 8 %
NEUTROPHILS # BLD AUTO: 4.8 10E3/UL (ref 1.6–8.3)
NEUTROPHILS NFR BLD AUTO: 50 %
NRBC # BLD AUTO: 0 10E3/UL
NRBC BLD AUTO-RTO: 0 /100
PLATELET # BLD AUTO: 278 10E3/UL (ref 150–450)
PROT SERPL-MCNC: 8.2 G/DL (ref 6.8–8.8)
RBC # BLD AUTO: 5.76 10E6/UL (ref 4.4–5.9)
WBC # BLD AUTO: 9.7 10E3/UL (ref 4–11)

## 2022-03-30 PROCEDURE — 85652 RBC SED RATE AUTOMATED: CPT | Performed by: INTERNAL MEDICINE

## 2022-03-30 PROCEDURE — 86140 C-REACTIVE PROTEIN: CPT | Performed by: INTERNAL MEDICINE

## 2022-03-30 PROCEDURE — 85025 COMPLETE CBC W/AUTO DIFF WBC: CPT | Performed by: INTERNAL MEDICINE

## 2022-03-30 PROCEDURE — 80076 HEPATIC FUNCTION PANEL: CPT | Performed by: INTERNAL MEDICINE

## 2022-04-19 NOTE — PROGRESS NOTES
This is a recent snapshot of the patient's [facility] medical record.  For current drug dose and complete information and questions, call [phone] or In [facility] pool, fv home infusion ([id number])  CSN Number:  [id number]

## 2022-04-25 ENCOUNTER — HOME INFUSION (PRE-WILLOW HOME INFUSION) (OUTPATIENT)
Dept: PHARMACY | Facility: CLINIC | Age: 22
End: 2022-04-25
Payer: COMMERCIAL

## 2022-04-25 NOTE — PROGRESS NOTES
This is a recent snapshot of the patient's [facility] medical record.  For current drug dose and complete information and questions, call [phone] or In Basket pool, fv home infusion ([id number])  CSN Number:  [id number]

## 2022-04-26 NOTE — PROGRESS NOTES
This is a recent snapshot of the patient's [facility] medical record.  For current drug dose and complete information and questions, call [phone] or In Basket pool, fv home infusion ([id number])  CSN Number:  [id number]

## 2022-04-27 ENCOUNTER — HOME INFUSION (PRE-WILLOW HOME INFUSION) (OUTPATIENT)
Dept: PHARMACY | Facility: CLINIC | Age: 22
End: 2022-04-27

## 2022-05-10 NOTE — PROGRESS NOTES
This is a recent snapshot of the patient's [facility] medical record.  For current drug dose and complete information and questions, call [phone] or In Basket pool, fv home infusion ([id number])  CSN Number:  [id number]

## 2022-05-23 ENCOUNTER — HOME INFUSION (PRE-WILLOW HOME INFUSION) (OUTPATIENT)
Dept: PHARMACY | Facility: CLINIC | Age: 22
End: 2022-05-23

## 2022-05-23 ENCOUNTER — HOME INFUSION (PRE-WILLOW HOME INFUSION) (OUTPATIENT)
Dept: PHARMACY | Facility: CLINIC | Age: 22
End: 2022-05-23
Payer: COMMERCIAL

## 2022-05-25 ENCOUNTER — HOME INFUSION (PRE-WILLOW HOME INFUSION) (OUTPATIENT)
Dept: PHARMACY | Facility: CLINIC | Age: 22
End: 2022-05-25

## 2022-05-25 ENCOUNTER — LAB REQUISITION (OUTPATIENT)
Dept: LAB | Facility: CLINIC | Age: 22
End: 2022-05-25
Payer: COMMERCIAL

## 2022-05-25 DIAGNOSIS — K51.90 ULCERATIVE COLITIS, UNSPECIFIED, WITHOUT COMPLICATIONS (H): ICD-10-CM

## 2022-05-25 LAB
ALBUMIN SERPL-MCNC: 4.3 G/DL (ref 3.4–5)
ALP SERPL-CCNC: 80 U/L (ref 40–150)
ALT SERPL W P-5'-P-CCNC: 121 U/L (ref 0–70)
AST SERPL W P-5'-P-CCNC: 44 U/L (ref 0–45)
BASOPHILS # BLD AUTO: 0 10E3/UL (ref 0–0.2)
BASOPHILS NFR BLD AUTO: 0 %
BILIRUB DIRECT SERPL-MCNC: 0.1 MG/DL (ref 0–0.2)
BILIRUB SERPL-MCNC: 0.6 MG/DL (ref 0.2–1.3)
CRP SERPL-MCNC: <2.9 MG/L (ref 0–8)
EOSINOPHIL # BLD AUTO: 0.1 10E3/UL (ref 0–0.7)
EOSINOPHIL NFR BLD AUTO: 1 %
ERYTHROCYTE [DISTWIDTH] IN BLOOD BY AUTOMATED COUNT: 13.1 % (ref 10–15)
ERYTHROCYTE [SEDIMENTATION RATE] IN BLOOD BY WESTERGREN METHOD: 4 MM/HR (ref 0–15)
HCT VFR BLD AUTO: 49.8 % (ref 40–53)
HGB BLD-MCNC: 17.2 G/DL (ref 13.3–17.7)
HOLD SPECIMEN: NORMAL
IMM GRANULOCYTES # BLD: 0 10E3/UL
IMM GRANULOCYTES NFR BLD: 0 %
LYMPHOCYTES # BLD AUTO: 3.4 10E3/UL (ref 0.8–5.3)
LYMPHOCYTES NFR BLD AUTO: 31 %
MCH RBC QN AUTO: 30.2 PG (ref 26.5–33)
MCHC RBC AUTO-ENTMCNC: 34.5 G/DL (ref 31.5–36.5)
MCV RBC AUTO: 88 FL (ref 78–100)
MONOCYTES # BLD AUTO: 0.9 10E3/UL (ref 0–1.3)
MONOCYTES NFR BLD AUTO: 8 %
NEUTROPHILS # BLD AUTO: 6.6 10E3/UL (ref 1.6–8.3)
NEUTROPHILS NFR BLD AUTO: 60 %
NRBC # BLD AUTO: 0 10E3/UL
NRBC BLD AUTO-RTO: 0 /100
PLATELET # BLD AUTO: 297 10E3/UL (ref 150–450)
PROT SERPL-MCNC: 8.8 G/DL (ref 6.8–8.8)
RBC # BLD AUTO: 5.69 10E6/UL (ref 4.4–5.9)
WBC # BLD AUTO: 11 10E3/UL (ref 4–11)

## 2022-05-25 PROCEDURE — 86140 C-REACTIVE PROTEIN: CPT | Performed by: INTERNAL MEDICINE

## 2022-05-25 PROCEDURE — 85652 RBC SED RATE AUTOMATED: CPT | Performed by: INTERNAL MEDICINE

## 2022-05-25 PROCEDURE — 85025 COMPLETE CBC W/AUTO DIFF WBC: CPT | Performed by: INTERNAL MEDICINE

## 2022-05-25 PROCEDURE — 80076 HEPATIC FUNCTION PANEL: CPT | Performed by: INTERNAL MEDICINE

## 2022-06-01 NOTE — PROGRESS NOTES
This is a recent snapshot of the patient's [facility] medical record.  For current drug dose and complete information and questions, call [phone] or In Basket pool, fv home infusion ([id number])  CSN Number:  [id number]

## 2022-06-06 NOTE — PROGRESS NOTES
This is a recent snapshot of the patient's [facility] medical record.  For current drug dose and complete information and questions, call [phone] or In Basket pool, fv home infusion ([id number])  CSN Number:  [id number]

## 2022-06-20 ENCOUNTER — HOME INFUSION (PRE-WILLOW HOME INFUSION) (OUTPATIENT)
Dept: PHARMACY | Facility: CLINIC | Age: 22
End: 2022-06-20

## 2022-06-21 NOTE — PROGRESS NOTES
This is a recent snapshot of the patient's [facility] medical record.  For current drug dose and complete information and questions, call [phone] or In Basket pool, fv home infusion ([id number])  CSN Number:  [id number]

## 2022-06-24 ENCOUNTER — HOME INFUSION (PRE-WILLOW HOME INFUSION) (OUTPATIENT)
Dept: PHARMACY | Facility: CLINIC | Age: 22
End: 2022-06-24

## 2022-06-27 NOTE — PROGRESS NOTES
This is a recent snapshot of the patient's [facility] medical record.  For current drug dose and complete information and questions, call [phone] or In Basket pool, fv home infusion ([id number])  CSN Number:  [id number]

## 2022-07-20 ENCOUNTER — HOME INFUSION (PRE-WILLOW HOME INFUSION) (OUTPATIENT)
Dept: PHARMACY | Facility: CLINIC | Age: 22
End: 2022-07-20

## 2022-07-20 NOTE — PROGRESS NOTES
This is a recent snapshot of the patient's [facility] medical record.  For current drug dose and complete information and questions, call [phone] or In Basket pool, fv home infusion ([id number])  CSN Number:  [id number]

## 2022-07-21 NOTE — PROGRESS NOTES
This is a recent snapshot of the patient's [facility] medical record.  For current drug dose and complete information and questions, call [phone] or In Basket pool, fv home infusion ([id number])  CSN Number:  [id number]

## 2022-07-22 ENCOUNTER — LAB REQUISITION (OUTPATIENT)
Dept: LAB | Facility: CLINIC | Age: 22
End: 2022-07-22
Payer: COMMERCIAL

## 2022-07-22 ENCOUNTER — HOME INFUSION (PRE-WILLOW HOME INFUSION) (OUTPATIENT)
Dept: PHARMACY | Facility: CLINIC | Age: 22
End: 2022-07-22

## 2022-07-22 DIAGNOSIS — K51.90 ULCERATIVE COLITIS, UNSPECIFIED, WITHOUT COMPLICATIONS (H): ICD-10-CM

## 2022-07-22 LAB
ALBUMIN SERPL-MCNC: 3.9 G/DL (ref 3.4–5)
ALP SERPL-CCNC: 68 U/L (ref 40–150)
ALT SERPL W P-5'-P-CCNC: 71 U/L (ref 0–70)
AST SERPL W P-5'-P-CCNC: 28 U/L (ref 0–45)
BASOPHILS # BLD AUTO: 0 10E3/UL (ref 0–0.2)
BASOPHILS NFR BLD AUTO: 0 %
BILIRUB DIRECT SERPL-MCNC: 0.1 MG/DL (ref 0–0.2)
BILIRUB SERPL-MCNC: 0.7 MG/DL (ref 0.2–1.3)
CRP SERPL-MCNC: <2.9 MG/L (ref 0–8)
EOSINOPHIL # BLD AUTO: 0.1 10E3/UL (ref 0–0.7)
EOSINOPHIL NFR BLD AUTO: 1 %
ERYTHROCYTE [DISTWIDTH] IN BLOOD BY AUTOMATED COUNT: 13 % (ref 10–15)
ERYTHROCYTE [SEDIMENTATION RATE] IN BLOOD BY WESTERGREN METHOD: 2 MM/HR (ref 0–15)
HCT VFR BLD AUTO: 51.6 % (ref 40–53)
HGB BLD-MCNC: 17.9 G/DL (ref 13.3–17.7)
HOLD SPECIMEN: NORMAL
IMM GRANULOCYTES # BLD: 0 10E3/UL
IMM GRANULOCYTES NFR BLD: 0 %
LYMPHOCYTES # BLD AUTO: 2.9 10E3/UL (ref 0.8–5.3)
LYMPHOCYTES NFR BLD AUTO: 39 %
MCH RBC QN AUTO: 30.5 PG (ref 26.5–33)
MCHC RBC AUTO-ENTMCNC: 34.7 G/DL (ref 31.5–36.5)
MCV RBC AUTO: 88 FL (ref 78–100)
MONOCYTES # BLD AUTO: 0.6 10E3/UL (ref 0–1.3)
MONOCYTES NFR BLD AUTO: 8 %
NEUTROPHILS # BLD AUTO: 3.7 10E3/UL (ref 1.6–8.3)
NEUTROPHILS NFR BLD AUTO: 52 %
NRBC # BLD AUTO: 0 10E3/UL
NRBC BLD AUTO-RTO: 0 /100
PLATELET # BLD AUTO: 272 10E3/UL (ref 150–450)
PROT SERPL-MCNC: 7.9 G/DL (ref 6.8–8.8)
RBC # BLD AUTO: 5.87 10E6/UL (ref 4.4–5.9)
WBC # BLD AUTO: 7.5 10E3/UL (ref 4–11)

## 2022-07-22 PROCEDURE — 86140 C-REACTIVE PROTEIN: CPT | Performed by: INTERNAL MEDICINE

## 2022-07-22 PROCEDURE — 85025 COMPLETE CBC W/AUTO DIFF WBC: CPT | Performed by: INTERNAL MEDICINE

## 2022-07-22 PROCEDURE — 85652 RBC SED RATE AUTOMATED: CPT | Performed by: INTERNAL MEDICINE

## 2022-07-22 PROCEDURE — 80076 HEPATIC FUNCTION PANEL: CPT | Performed by: INTERNAL MEDICINE

## 2022-08-17 ENCOUNTER — HOME INFUSION (PRE-WILLOW HOME INFUSION) (OUTPATIENT)
Dept: PHARMACY | Facility: CLINIC | Age: 22
End: 2022-08-17

## 2022-08-19 ENCOUNTER — HOME INFUSION (PRE-WILLOW HOME INFUSION) (OUTPATIENT)
Dept: PHARMACY | Facility: CLINIC | Age: 22
End: 2022-08-19

## 2022-08-19 NOTE — PROGRESS NOTES
This is a recent snapshot of the patient's [facility] medical record.  For current drug dose and complete information and questions, call [phone] or In Basket pool, fv home infusion ([id number])  CSN Number:  [id number]

## 2022-08-23 NOTE — PROGRESS NOTES
This is a recent snapshot of the patient's [facility] medical record.  For current drug dose and complete information and questions, call [phone] or In Basket pool, fv home infusion ([id number])  CSN Number:  [id number]

## 2022-08-31 NOTE — PROGRESS NOTES
This is a recent snapshot of the patient's [facility] medical record.  For current drug dose and complete information and questions, call [phone] or In Basket pool, fv home infusion ([id number])  CSN Number:  [id number]

## 2022-09-02 NOTE — PROGRESS NOTES
This is a recent snapshot of the patient's [facility] medical record.  For current drug dose and complete information and questions, call [phone] or In Basket pool, fv home infusion ([id number])  CSN Number:  [id number]

## 2022-09-13 ENCOUNTER — HOME INFUSION (PRE-WILLOW HOME INFUSION) (OUTPATIENT)
Dept: PHARMACY | Facility: CLINIC | Age: 22
End: 2022-09-13

## 2022-09-15 ENCOUNTER — LAB REQUISITION (OUTPATIENT)
Dept: LAB | Facility: CLINIC | Age: 22
End: 2022-09-15
Payer: COMMERCIAL

## 2022-09-15 ENCOUNTER — HOME INFUSION (PRE-WILLOW HOME INFUSION) (OUTPATIENT)
Dept: PHARMACY | Facility: CLINIC | Age: 22
End: 2022-09-15

## 2022-09-15 DIAGNOSIS — K51.90 ULCERATIVE COLITIS, UNSPECIFIED, WITHOUT COMPLICATIONS (H): ICD-10-CM

## 2022-09-15 LAB
ALBUMIN SERPL-MCNC: 3.8 G/DL (ref 3.4–5)
ALP SERPL-CCNC: 63 U/L (ref 40–150)
ALT SERPL W P-5'-P-CCNC: 70 U/L (ref 0–70)
AST SERPL W P-5'-P-CCNC: 29 U/L (ref 0–45)
BASOPHILS # BLD AUTO: 0 10E3/UL (ref 0–0.2)
BASOPHILS NFR BLD AUTO: 0 %
BILIRUB DIRECT SERPL-MCNC: 0.2 MG/DL (ref 0–0.2)
BILIRUB SERPL-MCNC: 0.8 MG/DL (ref 0.2–1.3)
CRP SERPL-MCNC: <2.9 MG/L (ref 0–8)
EOSINOPHIL # BLD AUTO: 0.2 10E3/UL (ref 0–0.7)
EOSINOPHIL NFR BLD AUTO: 2 %
ERYTHROCYTE [DISTWIDTH] IN BLOOD BY AUTOMATED COUNT: 12.6 % (ref 10–15)
ERYTHROCYTE [SEDIMENTATION RATE] IN BLOOD BY WESTERGREN METHOD: 1 MM/HR (ref 0–15)
HCT VFR BLD AUTO: 50 % (ref 40–53)
HGB BLD-MCNC: 17.4 G/DL (ref 13.3–17.7)
HOLD SPECIMEN: NORMAL
HOLD SPECIMEN: NORMAL
IMM GRANULOCYTES # BLD: 0 10E3/UL
IMM GRANULOCYTES NFR BLD: 0 %
LYMPHOCYTES # BLD AUTO: 2.9 10E3/UL (ref 0.8–5.3)
LYMPHOCYTES NFR BLD AUTO: 36 %
MCH RBC QN AUTO: 30.5 PG (ref 26.5–33)
MCHC RBC AUTO-ENTMCNC: 34.8 G/DL (ref 31.5–36.5)
MCV RBC AUTO: 88 FL (ref 78–100)
MONOCYTES # BLD AUTO: 0.7 10E3/UL (ref 0–1.3)
MONOCYTES NFR BLD AUTO: 9 %
NEUTROPHILS # BLD AUTO: 4.1 10E3/UL (ref 1.6–8.3)
NEUTROPHILS NFR BLD AUTO: 53 %
NRBC # BLD AUTO: 0 10E3/UL
NRBC BLD AUTO-RTO: 0 /100
PLATELET # BLD AUTO: 275 10E3/UL (ref 150–450)
PROT SERPL-MCNC: 7.9 G/DL (ref 6.8–8.8)
RBC # BLD AUTO: 5.71 10E6/UL (ref 4.4–5.9)
WBC # BLD AUTO: 7.9 10E3/UL (ref 4–11)

## 2022-09-15 PROCEDURE — 86140 C-REACTIVE PROTEIN: CPT | Performed by: INTERNAL MEDICINE

## 2022-09-15 PROCEDURE — 85652 RBC SED RATE AUTOMATED: CPT | Performed by: INTERNAL MEDICINE

## 2022-09-15 PROCEDURE — 80076 HEPATIC FUNCTION PANEL: CPT | Performed by: INTERNAL MEDICINE

## 2022-09-15 PROCEDURE — 85025 COMPLETE CBC W/AUTO DIFF WBC: CPT | Performed by: INTERNAL MEDICINE

## 2022-09-22 NOTE — PROGRESS NOTES
This is a recent snapshot of the patient's [facility] medical record.  For current drug dose and complete information and questions, call [phone] or In Basket pool, fv home infusion ([id number])  CSN Number:  [id number]

## 2022-09-24 ENCOUNTER — HEALTH MAINTENANCE LETTER (OUTPATIENT)
Age: 22
End: 2022-09-24

## 2022-10-02 NOTE — PROGRESS NOTES
This is a recent snapshot of the patient's [facility] medical record.  For current drug dose and complete information and questions, call [phone] or In Basket pool, fv home infusion ([id number])  CSN Number:  [id number]

## 2022-10-18 ENCOUNTER — MYC MEDICAL ADVICE (OUTPATIENT)
Dept: FAMILY MEDICINE | Facility: CLINIC | Age: 22
End: 2022-10-18

## 2022-10-19 ENCOUNTER — VIRTUAL VISIT (OUTPATIENT)
Dept: GASTROENTEROLOGY | Facility: CLINIC | Age: 22
End: 2022-10-19
Payer: COMMERCIAL

## 2022-10-19 DIAGNOSIS — K51.00 ULCERATIVE PANCOLITIS (H): Primary | ICD-10-CM

## 2022-10-19 DIAGNOSIS — D84.9 IMMUNOSUPPRESSION (H): ICD-10-CM

## 2022-10-19 PROCEDURE — 99214 OFFICE O/P EST MOD 30 MIN: CPT | Mod: 95 | Performed by: INTERNAL MEDICINE

## 2022-10-19 NOTE — PATIENT INSTRUCTIONS
PLAN  ---Continue IFX 10 mg/kg every 4 weeks  ---Check fecal calprotectin for disease activity screening -- to be mailed   ---MT referral   ---Dermatology for FBSE

## 2022-10-19 NOTE — PROGRESS NOTES
Jae is a 22 year old who is being evaluated via a billable video visit.      How would you like to obtain your AVS? MyChart  If the video visit is dropped, the invitation should be resent by: Text to cell phone: [phone]  Will anyone else be joining your video visit? No        Video-Visit Details    Video Start Time: 2:24 PM    Type of service:  Video Visit    Video End Time: 2:41 PM    Originating Location (pt. Location): Home        Distant Location (provider location):  On-site    Platform used for Video Visit: [facility] UC follow up       PATIENT: Jae Sparks    MRN: [id number]    Date of Birth 2000    Tel: [phone] (home)     PCP: No primary care provider on file.     HPI: Mr. Sparks is a 20 year old year old male here to establish care for ulcerative pancolitis.     UC history    Jae was diagnosed with pan-UC in late 2017. He was started on prednisone and Humira 8/2017. He felt somewhat better on Humira but lost response about a week with continued symptoms. Trough level in February 2018 -undetectable Humira level without antibodies and dose changed to weekly 2/2018.     Could not continue with self injections of Humira, so switched to Inflectra May 2020 (Dr. Landeros,  in [place]). Dose increased to 7.5 mg/kg q8 w when level was low (4.58, 0 ATIs) and FC was elevated at 441.     Last inflectra dose was 8 weeks ago.      Macroscopic extent of disease (most recent) E3    Current UC symptoms    Bowel frequency in day 3 (in the morning)   Bowel frequency in night 0  Urgency of defecation occasional  Blood in stool none  General well being 0 = very well  Extracolonic features (multiple select) none     Constitutional symptoms:  Fever NO  Weight loss NO    Noteworthy diet history- no dairy, low fiber     Other GI symptoms present none    Total number of IBD surgeries (except perianal): 0    Current IBD Medications:  Inflectra    Past IBD Medications:    Prednisone  Humira      Interval history, 12/2020  Doing well. Next inflectra on 1/18/2021. No breakthrough symptoms.     Current UC symptoms    Bowel frequency in day 2-3   Bowel frequency in night 0  Urgency of defecation occasional  Blood in stool none  General well being 0 = very well  Extracolonic features (multiple select) none     Interval history, 11/2021  A couple days prior to inflectra may feel some urgency.       Current UC symptoms  Bowel frequency in day 1-3   Bowel frequency in night 0  Urgency of defecation occasional  Blood in stool none  General well being 0 = very well  Extracolonic features (multiple select) none    Interval history, 10/2022  IFX level was only 0.7 in 12/2021 and his IFX was increased to 10 mg/kg q4w.     Current UC symptoms  Bowel frequency in day 1-2  Bowel frequency in night 0  Urgency of defecation no  Blood in stool none  General well being 0 = very well  Extracolonic features (multiple select) none      Past Medical History:   Diagnosis Date     Uncomplicated asthma         Past Surgical History:   Procedure Laterality Date     NO HISTORY OF SURGERY         Social History     Tobacco Use     Smoking status: Never     Smokeless tobacco: Never   Substance Use Topics     Alcohol use: Not Currently       Family History   Problem Relation Age of Onset     Inflammatory Bowel Disease No family hx of      Autoimmune Disease No family hx of      Colon Cancer No family hx of        Allergies   Allergen Reactions     Pollen Extract Itching     Cantaloupe (Diagnostic) Hives and Itching     Cats         Outpatient Encounter Medications as of 10/19/2022   Medication Sig Dispense Refill     albuterol (PROAIR HFA/PROVENTIL HFA/VENTOLIN HFA) 108 (90 Base) MCG/ACT inhaler        fexofenadine (ALLEGRA) 180 MG tablet Take 180 mg by mouth daily as needed for allergies (in spring and summer)       omeprazole (PRILOSEC) 40 MG DR capsule Take 40 mg by mouth daily as needed        No  facility-administered encounter medications on file as of 10/19/2022.     NSAID  NO    Review of Systems  Complete 10 System ROS performed. All are negative except as documented below, in the HPI, or in patient questionnaire from today's visit.    1) Constitutional: No fevers, chills, night sweats or malaise, weight loss or gain  2) Skin: No rash  3) Pulmonary: No wheeze, SOB, cough, sputum or hemoptysis  4) Cardiovascular: No Chest pain or palpitations  5) Genitourinary: No blood in urine or dysuria  6) Endocrine: No increased sweating, hunger, thirst or thyroid problems  7) Hematologic: No bruising and easy bleeding  8) Musculoskeletal: no new pain in joints or limitation in ROM  9) Neurologic: No dizziness, paresthesias or weakness or falls  10) Psychiatric:  not depressed/anxious, no sleep problems    PHYSICAL EXAM  Vitals: There were no vitals taken for this visit.    No Pain (0)     General appearance  Healthy appearing adult, in no acute distress     Eyes  Sclera anicteric  Pupils round and reactive to light     Ears, nose, mouth and throat  No obvious external lesions of ears and nose  Hearing intact     Neck  Symmetric  No obvious external lesions     Respiratory  Normal respiration, no use of accessory muscles      MSK  Gait normal     Skin  No rashes or jaundice      Psychiatric  Oriented to person, place and time  Appropriate mood and affect.   DATA:  Reviewed in detail past documentation, medications and prior workup available in electronic health records or through outside records.    PERTINENT STUDIES:  Tuberculosis: QFN neg 12/2019  HBV serology neg in 1/2018    DRUG MONITORING  Biologic concentration: IFX trough 4.58 (ATIs were not tested, given presence of drug level). 12/201: IFX trough was only 0.7, 0 ATIs. Increased to 10 mg/kg every 4 weeks.     5/16/2016 Flex sig: Signs of colonic inflammation identified in the left colon predominantly, likely beyond  Bx - mild active colitis, favored to be a  resolving or self limited infectious colitis    7/10/2017 Flex sig: Moderate colitis from rectum to 40cm consistent with ulcerative colitis.  Bx - chronic colitis, moderately active.  Treated with lialda, an ti-TNF agent discussed but not started.    8/2/2017 CT Abdomen/Pelvis: ? Acute pyelonephritis, diffuse colon wall thickening.    8/4/2017 Sigmoidoscopy: Punched out deep ulceration/edema/erythema up to 70 cm. Biopsies take from mid-descending, distal descending, proximal sigmoid, distal sigmoid, and rectum.    6/7/2018 Colonoscopy for IBD disease assessment:  Scarring seen throughout the colon c/w healed ulcerative colitis. Scattered pseudopolyps seen throughout the colon, most marked in the ascending colon and cecum. Mild pinpoint erosions were seen in the terminal ileum. Random biopsies were taken throughout.   Endoscopic examination was performed to the Terminal ileum. This was for assessment of ulcerative colitis disease activity.  The Lee score for each colonic segment is below:  Rectum: 0 (normal) - normal path  Sigmoid colon: 0 (normal) - normal path  Descending colon: 0 (normal) - normal path  Transverse colon: 0 (normal) - normal path  Ascending colon: 0 (normal) - focal active colitis  Cecum: 0 (normal) - normal path  Terminal ileum: Mild pinpoint erosions. - focal active ileitis - ?medication or prep effect  Based on your overall colonoscopy findings, you have colitis that is Completely healed (Lee 0), though there is possible mild terminal ileitis.       IMPRESSION:    DIAGNOSIS:  # E3 UC in clinical remission on inflectra 10 mg/kg q4w in deep remission  # IBD Health Care Maintenance  Mr. Sparks is a 22 year old here with E3 UC in deep remission (icscope in 2018 showed Lee 0) on Inflectra 10 mg/kg q4w. We will plan for the following:    PLAN:  ---Continue IFX 10 mg/kg every 4 weeks  ---Check fecal calprotectin for disease activity screening -- to be mailed   ---[facility] referral for healthcare  maintenance   ---Dermatology for FBSE   ---Colon cancer screening to start in 2025    Misc:  -- Avoid tobacco use  -- Avoid NSAIDs as there is potentially a 25% chance of causing an IBD flare    30 minutes spent on the date of the encounter performing chart review, history and exam, documentation and further activities as noted above.    Return in about 6 months (around 4/19/2023).    Jigna Eng MD   of Medicine  Division of Gastroenterology, Hepatology and Nutrition  [facility]

## 2022-10-19 NOTE — LETTER
10/19/2022         RE: Jae Sparks  [address]  [facility] [address]        Dear Colleague,    Thank you for referring your patient, Jae Sparks, to the [facility] [place]. Please see a copy of my visit note below.      [facility] UC follow up         PATIENT: Jae Sparks    MRN: [id number]    Date of Birth 2000    Tel: [phone] (home)     PCP: No primary care provider on file.     HPI: Mr. Sparks is a 20 year old year old male here to establish care for ulcerative pancolitis.     UC history    Jae was diagnosed with pan-UC in late 2017. He was started on prednisone and Humira 8/2017. He felt somewhat better on Humira but lost response about a week with continued symptoms. Trough level in February 2018 -undetectable Humira level without antibodies and dose changed to weekly 2/2018.     Could not continue with self injections of Humira, so switched to Inflectra May 2020 (Dr. Landeros,  in [place]). Dose increased to 7.5 mg/kg q8 w when level was low (4.58, 0 ATIs) and FC was elevated at 441.     Last inflectra dose was 8 weeks ago.      Macroscopic extent of disease (most recent) E3    Current UC symptoms    Bowel frequency in day 3 (in the morning)   Bowel frequency in night 0  Urgency of defecation occasional  Blood in stool none  General well being 0 = very well  Extracolonic features (multiple select) none     Constitutional symptoms:  Fever NO  Weight loss NO    Noteworthy diet history- no dairy, low fiber     Other GI symptoms present none    Total number of IBD surgeries (except perianal): 0    Current IBD Medications:  Inflectra    Past IBD Medications:   Prednisone  Humira      Interval history, 12/2020  Doing well. Next inflectra on 1/18/2021. No breakthrough symptoms.     Current UC symptoms    Bowel frequency in day 2-3   Bowel frequency in night 0  Urgency of defecation occasional  Blood in stool none  General well  being 0 = very well  Extracolonic features (multiple select) none     Interval history, 11/2021  A couple days prior to inflectra may feel some urgency.       Current UC symptoms  Bowel frequency in day 1-3   Bowel frequency in night 0  Urgency of defecation occasional  Blood in stool none  General well being 0 = very well  Extracolonic features (multiple select) none    Interval history, 10/2022  IFX level was only 0.7 in 12/2021 and his IFX was increased to 10 mg/kg q4w.     Current UC symptoms  Bowel frequency in day 1-2  Bowel frequency in night 0  Urgency of defecation no  Blood in stool none  General well being 0 = very well  Extracolonic features (multiple select) none      Past Medical History:   Diagnosis Date     Uncomplicated asthma         Past Surgical History:   Procedure Laterality Date     NO HISTORY OF SURGERY         Social History     Tobacco Use     Smoking status: Never     Smokeless tobacco: Never   Substance Use Topics     Alcohol use: Not Currently       Family History   Problem Relation Age of Onset     Inflammatory Bowel Disease No family hx of      Autoimmune Disease No family hx of      Colon Cancer No family hx of        Allergies   Allergen Reactions     Pollen Extract Itching     Cantaloupe (Diagnostic) Hives and Itching     Cats         Outpatient Encounter Medications as of 10/19/2022   Medication Sig Dispense Refill     albuterol (PROAIR HFA/PROVENTIL HFA/VENTOLIN HFA) 108 (90 Base) MCG/ACT inhaler        fexofenadine (ALLEGRA) 180 MG tablet Take 180 mg by mouth daily as needed for allergies (in spring and summer)       omeprazole (PRILOSEC) 40 MG DR capsule Take 40 mg by mouth daily as needed        No facility-administered encounter medications on file as of 10/19/2022.     NSAID  NO    Review of Systems  Complete 10 System ROS performed. All are negative except as documented below, in the HPI, or in patient questionnaire from today's visit.    1) Constitutional: No fevers,  chills, night sweats or malaise, weight loss or gain  2) Skin: No rash  3) Pulmonary: No wheeze, SOB, cough, sputum or hemoptysis  4) Cardiovascular: No Chest pain or palpitations  5) Genitourinary: No blood in urine or dysuria  6) Endocrine: No increased sweating, hunger, thirst or thyroid problems  7) Hematologic: No bruising and easy bleeding  8) Musculoskeletal: no new pain in joints or limitation in ROM  9) Neurologic: No dizziness, paresthesias or weakness or falls  10) Psychiatric:  not depressed/anxious, no sleep problems    PHYSICAL EXAM  Vitals: There were no vitals taken for this visit.    No Pain (0)     General appearance  Healthy appearing adult, in no acute distress     Eyes  Sclera anicteric  Pupils round and reactive to light     Ears, nose, mouth and throat  No obvious external lesions of ears and nose  Hearing intact     Neck  Symmetric  No obvious external lesions     Respiratory  Normal respiration, no use of accessory muscles      MSK  Gait normal     Skin  No rashes or jaundice      Psychiatric  Oriented to person, place and time  Appropriate mood and affect.   DATA:  Reviewed in detail past documentation, medications and prior workup available in electronic health records or through outside records.    PERTINENT STUDIES:  Tuberculosis: QFN neg 12/2019  HBV serology neg in 1/2018    DRUG MONITORING  Biologic concentration: IFX trough 4.58 (ATIs were not tested, given presence of drug level). 12/201: IFX trough was only 0.7, 0 ATIs. Increased to 10 mg/kg every 4 weeks.     5/16/2016 Flex sig: Signs of colonic inflammation identified in the left colon predominantly, likely beyond  Bx - mild active colitis, favored to be a resolving or self limited infectious colitis    7/10/2017 Flex sig: Moderate colitis from rectum to 40cm consistent with ulcerative colitis.  Bx - chronic colitis, moderately active.  Treated with lialda, an ti-TNF agent discussed but not started.    8/2/2017 CT Abdomen/Pelvis:  ? Acute pyelonephritis, diffuse colon wall thickening.    8/4/2017 Sigmoidoscopy: Punched out deep ulceration/edema/erythema up to 70 cm. Biopsies take from mid-descending, distal descending, proximal sigmoid, distal sigmoid, and rectum.    6/7/2018 Colonoscopy for IBD disease assessment:  Scarring seen throughout the colon c/w healed ulcerative colitis. Scattered pseudopolyps seen throughout the colon, most marked in the ascending colon and cecum. Mild pinpoint erosions were seen in the terminal ileum. Random biopsies were taken throughout.   Endoscopic examination was performed to the Terminal ileum. This was for assessment of ulcerative colitis disease activity.  The Lee score for each colonic segment is below:  Rectum: 0 (normal) - normal path  Sigmoid colon: 0 (normal) - normal path  Descending colon: 0 (normal) - normal path  Transverse colon: 0 (normal) - normal path  Ascending colon: 0 (normal) - focal active colitis  Cecum: 0 (normal) - normal path  Terminal ileum: Mild pinpoint erosions. - focal active ileitis - ?medication or prep effect  Based on your overall colonoscopy findings, you have colitis that is Completely healed (Lee 0), though there is possible mild terminal ileitis.       IMPRESSION:    DIAGNOSIS:  # E3 UC in clinical remission on inflectra 10 mg/kg q4w in deep remission  # IBD Health Care Maintenance  Mr. Sparks is a 22 year old here with E3 UC in deep remission (icscope in 2018 showed Lee 0) on Inflectra 10 mg/kg q4w. We will plan for the following:    PLAN:  ---Continue IFX 10 mg/kg every 4 weeks  ---Check fecal calprotectin for disease activity screening -- to be mailed   ---[facility] referral for healthcare maintenance   ---Dermatology for FBSE   ---Colon cancer screening to start in 2025    Misc:  -- Avoid tobacco use  -- Avoid NSAIDs as there is potentially a 25% chance of causing an IBD flare    30 minutes spent on the date of the encounter performing chart review, history and exam,  documentation and further activities as noted above.    Return in about 6 months (around 4/19/2023).    Jigna Eng MD   of Medicine  Division of Gastroenterology, Hepatology and Nutrition  [facility]

## 2022-10-20 ENCOUNTER — DOCUMENTATION ONLY (OUTPATIENT)
Dept: GASTROENTEROLOGY | Facility: CLINIC | Age: 22
End: 2022-10-20

## 2022-10-20 NOTE — PROGRESS NOTES
Stool Kit (Fecal yun / C-diff) mailed to Pt via [employer]ando.    On Thursday 10/20/2022    Juliet Toure MA

## 2022-10-27 ENCOUNTER — VIRTUAL VISIT (OUTPATIENT)
Dept: PHARMACY | Facility: CLINIC | Age: 22
End: 2022-10-27
Attending: INTERNAL MEDICINE
Payer: COMMERCIAL

## 2022-10-27 DIAGNOSIS — J45.20 MILD INTERMITTENT ASTHMA, UNSPECIFIED WHETHER COMPLICATED: ICD-10-CM

## 2022-10-27 DIAGNOSIS — K51.00 ULCERATIVE PANCOLITIS WITHOUT COMPLICATION (H): Primary | ICD-10-CM

## 2022-10-27 PROCEDURE — 99607 MTMS BY PHARM ADDL 15 MIN: CPT | Performed by: PHARMACIST

## 2022-10-27 PROCEDURE — 99605 MTMS BY PHARM NP 15 MIN: CPT | Performed by: PHARMACIST

## 2022-10-27 NOTE — PATIENT INSTRUCTIONS
"Recommendations from today's MTM visit:                                                       1. Future Quantiferon (tuberculosis screening) ordered    -- You can complete this at any [facility] lab at your convenience    2. Consider the following vaccines:   -- repeat hepatitis B series or the hepatitis A/B series (Twinrix)    -- Shingrix series (for Shingles)   -- Pneumovax-23 booster due in 3/2023    Follow-up: 1 year for health maintenance review, sooner if needed    It was great speaking with you today.  I value your experience and would be very thankful for your time in providing feedback in our clinic survey. In the next few days, you may receive an email or text message from [employer] with a link to a survey related to your  clinical pharmacist.\"     To schedule another MTM appointment, please call the clinic directly or you may call the MTM scheduling line at [phone] or toll-free at [phone].     My Clinical Pharmacist's contact information:                                                      Please feel free to contact me with any questions or concerns you have.      Janet MoralesD, BCACP  MTM Pharmacist   [facility]astroenter[facility]   Phone: [phone]    "

## 2022-10-27 NOTE — PROGRESS NOTES
This is a recent snapshot of the patient's [facility] medical record.  For current drug dose and complete information and questions, call [phone] or In Basket pool, fv home infusion ([id number])  CSN Number:  [id number]

## 2022-10-27 NOTE — PROGRESS NOTES
Medication Therapy Management (MTM) Encounter    ASSESSMENT:                            Medication Adherence/Access: No issues identified    Ulcerative Colitis: Jae would benefit from updating his health maintenance items in light of ongoing immunosuppression with anti-TNF therapy. We reviewed that his repeat hepatitis B serologies were negative, and his hepatitis A antibodies were also non-reactive. We would recommend repeating the hepatitis B series, and he could consider doing the combination hepatitis A/B vaccine. He will be due for a booster dose of Pneumovax-23 in March next year, and we discussed the recommendation for Shingrix. His last quantiferon on file is a few years old, so we will plan on updating this as well. Discussed that his particular lab can not be drawn by home infusion.    Asthma: Stable based on report.    PLAN:                            1. Future Quantiferon (tuberculosis screening) ordered per CPA with Dr. Eng.   -- Jae to complete this at any [facility] lab at his convenience    2. Jae to consider the following vaccines:   -- repeat hepatitis B series or the hepatitis A/B series (Twinrix)    -- Shingrix series (for Shingles)   -- Pneumovax-23 booster due in 3/2023    Follow-up: 1 year for health maintenance review, sooner if needed    SUBJECTIVE/OBJECTIVE:                          Jae Sparks is a 22 year old male called for a follow-up visit.  Today's visit is a follow-up MTM visit from 9/28/2020.     Reason for visit: IBD health maintenance on Inflectra    Allergies/ADRs: Reviewed in chart  Tobacco: He reports that he has never smoked. He has never used smokeless tobacco.  Alcohol: 1-3 beverages / week    Medication Adherence/Access: no issues reported    Ulcerative Colitis:   Infliximab 10 mg/kg every 4 weeks  Omeprazole 40 mg daily as needed (not really using)    Jae was recently seen by Dr. Eng on 10/19 with plan to continue with infliximab at current dosing.  He was referred to me today to review his health maintenance, and denies any other medication concerns at this time. States he really only needs the omeprazole during ulcerative colitis flares.    IBD Health Care Maintenance:    Vaccinations:  All patients on biologics should avoid live vaccines.    -- Influenza (every year) 10/1/2022  -- TdaP (every 10 years) last 5/12/2016  -- Pneumococcal Pneumonia (once plus booster at 5 years)              - Prevnar-13 1/10/2018              - Pneumovax-23 3/26/2018, due 2023  -- COVID 3/25/21, 4/27/21, 11/3/21, 10/6/22 (bivalent)  -- Yearly assessment for latent Tb (verbal screening and exam, PPD or QuantiFERON-Tb testing)              - negative 5/3/2019     One time confirmation of immunity or serologies:  -- Hepatitis A (serologies or immunizations) vaccination complete 2002, IgM antibody not detected 1/2022  -- Hepatitis B (serologies or immunizations) vaccination complete 1998, booster x 1 dose 2016 1/31/2018 Hepatitis B Surface Ab Unit <=9.99 mIU/mL 5.44 Hep Bs Ab Interp Negative Comment: An antibody of 10.00 mIU/ml or greater implies immunity.    Reference interval                   9.99 mIU/ml or less: Negative                   10.00 mIU/ml or greater: Positive      -- Varicella 5/20/1999, 10/26/2010, and 5/20/2016  -- MMR third dose 5/20/2016  -- HPV (all aged 18-26)  1/21/2019, 8/4/2014, 8/19/2013  -- Meningococcal meningitis (all patients at risk for meningitis)-- 5/12/2016 and 8/4/2014     Due to the immunosuppression in this patient, I would not advise administration of live vaccines such as varicella/VZV, intranasal influenza, MMR, or yellow fever vaccine (if traveling).    Pre-Biologic Screening:  -- Hep B Surface Antibody serologies do not indicate immunity 1/2022  -- Hep B Surface Antigen non-reactive 1/2022  -- Hep B Core Antibody non-reactive 1/2022    Bone mineral density screening   -- Recommend all patients supplement with calcium and vitamin  D    Cancer Screening:  Colon cancer screening:  ---Colon cancer screening to start in 2025    Skin cancer screening: Annual visual exam of skin by dermatologist since patient is immunocompromised    -- scheduled for 11/4    Depression Screening:  -- Over the last month, have you felt down, depressed, or hopeless? No  -- Over the last month, have you felt little interest or pleasure doing things? No    Misc:  -- Avoid tobacco use  -- Avoid NSAIDs as there is potentially a 25% chance of causing an IBD flare      Asthma:   Albuterol HFA as needed  Allegra 180 mg daily as needed (seasonally)  flonase as needed 2 sprays/nostril (seasonally)    Notes he hasn't used the albuterol in quite awhile. Mainly only when sick. Denies concerns with his allergy medications, and acknowledges this is mainly in the spring/fall.    ----------------    I spent 16 minutes with this patient today. All changes were made via collaborative practice agreement with Jigna Eng. A copy of the visit note was provided to the patient's provider(s).    The patient was sent via MyCare a summary of these recommendations.     Janet Hernandez PharmD, BCACP  MTM Pharmacist   [facility] Gastroenter[facility]   Phone: [phone]    Telemedicine Visit Details  Type of service:  Telephone visit  Start Time: 3:02 PM  End Time: 3:18 PM  Originating Location (pt. Location): Home      Distant Location (provider location):  Off-site  Provider has received verbal consent for a visit from the patient? Yes     Medication Therapy Recommendations  Ulcerative colitis (H)    Rationale: Preventive therapy - Needs additional medication therapy - Indication   Recommendation: Start Medication - SHINGRIX IM   Status: Accepted - no CPA Needed   Note: Recommend Shingrix series, hepatitis B series (or combination A/B series). Due for Pneumovax-23 in 3/2023.

## 2022-10-27 NOTE — PROGRESS NOTES
This is a recent snapshot of the patient's [facility] medical record.  For current drug dose and complete information and questions, call [phone] or In Basket pool, fv home infusion ([id number])  CSN Number:  [id number]

## 2022-11-04 ENCOUNTER — OFFICE VISIT (OUTPATIENT)
Dept: DERMATOLOGY | Facility: CLINIC | Age: 22
End: 2022-11-04
Payer: COMMERCIAL

## 2022-11-04 DIAGNOSIS — D18.01 ANGIOMA OF SKIN: ICD-10-CM

## 2022-11-04 DIAGNOSIS — D22.9 NEVUS: Primary | ICD-10-CM

## 2022-11-04 DIAGNOSIS — D84.9 IMMUNOSUPPRESSION (H): ICD-10-CM

## 2022-11-04 DIAGNOSIS — L81.4 LENTIGO: ICD-10-CM

## 2022-11-04 PROCEDURE — 99203 OFFICE O/P NEW LOW 30 MIN: CPT | Performed by: PHYSICIAN ASSISTANT

## 2022-11-04 ASSESSMENT — PAIN SCALES - GENERAL: PAINLEVEL: NO PAIN (0)

## 2022-11-04 NOTE — PROGRESS NOTES
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  On review of systems, there are no further skin complaints, patient is feeling otherwise well.   ROS of the following were done and are negative: Constitutional, Eyes, Ears, Nose,   Mouth, Throat, Cardiovascular, Respiratory, GI, Genitourinary, Musculoskeletal,   Psychiatric, Endocrine, Allergic/Immunologic.    PHYSICAL EXAM:   There were no vitals taken for this visit.  Skin exam performed as follows: Type 3 skin. Mood appropriate  Alert and Oriented X 3. Well developed, well nourished in no distress.  General appearance: Normal  Head including face: Normal  Eyes: conjunctiva and lids: Normal  Mouth: Lips, teeth, gums: Normal  Neck: Normal  Chest-breast/axillae: Normal  Back: Normal  Spleen and liver: Normal  Cardiovascular: Exam of peripheral vascular system by observation for swelling, varicosities, edema: Normal  Genitalia: groin, buttocks: Normal  Extremities: digits/nails (clubbing): Normal  Eccrine and Apocrine glands: Normal  Right upper extremity: Normal  Left upper extremity: Normal  Right lower extremity: Normal  Left lower extremity: Normal  Skin: Scalp and body hair: See below    Pt deferred exam of breasts, groin, buttocks: No    Other physical findings:  1. Multiple pigmented macules on extremities and trunk  2. Multiple pigmented macules on face, trunk and extremities  3. Multiple vascular papules on trunk, arms and legs       Except as noted above, no other signs of  skin cancer or melanoma.     ASSESSMENT/PLAN:   Benign Full skin cancer screening today. . Patient with history of none; currently immunosuppressed  Advised on monthly self exams and 1 year  Patient Education: Appropriate brochures given.    1. Multiple benign appearing melanocytic nevi on arms, legs and trunk. Discussed ABCDEs of melanoma and sunscreen.   2. Multiple lentigos on arms, legs and trunk. Advised benign, no treatment needed.  3. Multiple scattered angiomas. Advised benign, no treatment needed.             Follow-up: yearly/PRN sooner    1.) Patient was asked about new and changing moles. YES  2.) Patient received a complete physical skin examination: YES  3.) Patient was counseled to perform a monthly self skin examination: YES  Scribed By: Karishma Gomez MS, PAGLENN

## 2022-11-07 NOTE — LETTER
11/4/2022         RE: Jae Sparks  [address]  [facility] [address]        Dear Colleague,    Thank you for referring your patient, Jae Sparks, to the [facility]. Please see a copy of my visit note below.    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  On review of systems, there are no further skin complaints, patient is feeling otherwise well.   ROS of the following were done and are negative: Constitutional, Eyes, Ears, Nose,   Mouth, Throat, Cardiovascular, Respiratory, GI, Genitourinary, Musculoskeletal,   Psychiatric, Endocrine, Allergic/Immunologic.    PHYSICAL EXAM:   There were no vitals taken for this visit.  Skin exam performed as follows: Type 3 skin. Mood appropriate  Alert and Oriented X 3. Well developed, well nourished in no distress.  General appearance: Normal  Head including face: Normal  Eyes: conjunctiva and lids: Normal  Mouth: Lips, teeth, gums: Normal  Neck: Normal  Chest-breast/axillae: Normal  Back: Normal  Spleen and liver: Normal  Cardiovascular: Exam of peripheral vascular system by observation for swelling, varicosities, edema: Normal  Genitalia: groin, buttocks: Normal  Extremities: digits/nails (clubbing): Normal  Eccrine and Apocrine glands: Normal  Right upper extremity: Normal  Left upper extremity: Normal  Right lower extremity: Normal  Left lower extremity: Normal  Skin: Scalp and body hair: See below    Pt deferred exam of  breasts, groin, buttocks: No    Other physical findings:  1. Multiple pigmented macules on extremities and trunk  2. Multiple pigmented macules on face, trunk and extremities  3. Multiple vascular papules on trunk, arms and legs       Except as noted above, no other signs of skin cancer or melanoma.     ASSESSMENT/PLAN:   Benign Full skin cancer screening today. . Patient with history of none; currently immunosuppressed  Advised on monthly self exams and 1 year  Patient Education: Appropriate brochures given.    1. Multiple benign appearing melanocytic nevi on arms, legs and trunk. Discussed ABCDEs of melanoma and sunscreen.   2. Multiple lentigos on arms, legs and trunk. Advised benign, no treatment needed.  3. Multiple scattered angiomas. Advised benign, no treatment needed.             Follow-up: yearly/PRN sooner    1.) Patient was asked about new and changing moles. YES  2.) Patient received a complete physical skin examination: YES  3.) Patient was counseled to perform a monthly self skin examination: YES  Scribed By: Karishma Gomez, MS, PAGLENN          Again, thank you for allowing me to participate in the care of your patient.        Sincerely,        Karishma Gomez PA-C     No/Unable to asses

## 2022-11-08 ENCOUNTER — LAB (OUTPATIENT)
Dept: LAB | Facility: CLINIC | Age: 22
End: 2022-11-08
Payer: COMMERCIAL

## 2022-11-08 DIAGNOSIS — K51.00 ULCERATIVE PANCOLITIS WITHOUT COMPLICATION (H): ICD-10-CM

## 2022-11-08 DIAGNOSIS — K51.00 ULCERATIVE PANCOLITIS (H): ICD-10-CM

## 2022-11-08 PROCEDURE — 83993 ASSAY FOR CALPROTECTIN FECAL: CPT

## 2022-11-08 PROCEDURE — 86481 TB AG RESPONSE T-CELL SUSP: CPT

## 2022-11-08 PROCEDURE — 36415 COLL VENOUS BLD VENIPUNCTURE: CPT

## 2022-11-09 LAB
CALPROTECTIN STL-MCNT: 8 MG/KG (ref 0–49.9)
QUANTIFERON MITOGEN: 10 IU/ML
QUANTIFERON NIL TUBE: 0.04 IU/ML
QUANTIFERON TB1 TUBE: 0.04 IU/ML
QUANTIFERON TB2 TUBE: 0.04

## 2022-11-10 ENCOUNTER — LAB REQUISITION (OUTPATIENT)
Dept: LAB | Facility: CLINIC | Age: 22
End: 2022-11-10
Payer: COMMERCIAL

## 2022-11-10 DIAGNOSIS — K51.90 ULCERATIVE COLITIS, UNSPECIFIED, WITHOUT COMPLICATIONS (H): ICD-10-CM

## 2022-11-10 LAB
ALBUMIN SERPL-MCNC: 4.1 G/DL (ref 3.4–5)
ALP SERPL-CCNC: 67 U/L (ref 40–150)
ALT SERPL W P-5'-P-CCNC: 67 U/L (ref 0–70)
ANION GAP SERPL CALCULATED.3IONS-SCNC: 8 MMOL/L (ref 3–14)
AST SERPL W P-5'-P-CCNC: 27 U/L (ref 0–45)
BASOPHILS # BLD AUTO: 0.1 10E3/UL (ref 0–0.2)
BASOPHILS NFR BLD AUTO: 1 %
BILIRUB DIRECT SERPL-MCNC: 0.1 MG/DL (ref 0–0.2)
BILIRUB SERPL-MCNC: 0.8 MG/DL (ref 0.2–1.3)
BUN SERPL-MCNC: 8 MG/DL (ref 7–30)
CALCIUM SERPL-MCNC: 9.1 MG/DL (ref 8.5–10.1)
CHLORIDE BLD-SCNC: 105 MMOL/L (ref 94–109)
CO2 SERPL-SCNC: 27 MMOL/L (ref 20–32)
CREAT SERPL-MCNC: 0.98 MG/DL (ref 0.66–1.25)
CRP SERPL-MCNC: <2.9 MG/L (ref 0–8)
EOSINOPHIL # BLD AUTO: 0.2 10E3/UL (ref 0–0.7)
EOSINOPHIL NFR BLD AUTO: 3 %
ERYTHROCYTE [DISTWIDTH] IN BLOOD BY AUTOMATED COUNT: 12.9 % (ref 10–15)
ERYTHROCYTE [SEDIMENTATION RATE] IN BLOOD BY WESTERGREN METHOD: 4 MM/HR (ref 0–15)
GAMMA INTERFERON BACKGROUND BLD IA-ACNC: 0.04 IU/ML
GFR SERPL CREATININE-BSD FRML MDRD: >90 ML/MIN/1.73M2
GLUCOSE BLD-MCNC: 80 MG/DL (ref 70–99)
HCT VFR BLD AUTO: 51.8 % (ref 40–53)
HGB BLD-MCNC: 17.7 G/DL (ref 13.3–17.7)
HOLD SPECIMEN: NORMAL
HOLD SPECIMEN: NORMAL
IMM GRANULOCYTES # BLD: 0 10E3/UL
IMM GRANULOCYTES NFR BLD: 0 %
LYMPHOCYTES # BLD AUTO: 3 10E3/UL (ref 0.8–5.3)
LYMPHOCYTES NFR BLD AUTO: 38 %
M TB IFN-G BLD-IMP: NEGATIVE
M TB IFN-G CD4+ BCKGRND COR BLD-ACNC: 9.96 IU/ML
MCH RBC QN AUTO: 30.2 PG (ref 26.5–33)
MCHC RBC AUTO-ENTMCNC: 34.2 G/DL (ref 31.5–36.5)
MCV RBC AUTO: 88 FL (ref 78–100)
MITOGEN IGNF BCKGRD COR BLD-ACNC: 0 IU/ML
MITOGEN IGNF BCKGRD COR BLD-ACNC: 0 IU/ML
MONOCYTES # BLD AUTO: 0.7 10E3/UL (ref 0–1.3)
MONOCYTES NFR BLD AUTO: 9 %
NEUTROPHILS # BLD AUTO: 4 10E3/UL (ref 1.6–8.3)
NEUTROPHILS NFR BLD AUTO: 49 %
NRBC # BLD AUTO: 0 10E3/UL
NRBC BLD AUTO-RTO: 0 /100
PLATELET # BLD AUTO: 278 10E3/UL (ref 150–450)
POTASSIUM BLD-SCNC: 4.1 MMOL/L (ref 3.4–5.3)
PROT SERPL-MCNC: 8 G/DL (ref 6.8–8.8)
RBC # BLD AUTO: 5.87 10E6/UL (ref 4.4–5.9)
SODIUM SERPL-SCNC: 140 MMOL/L (ref 133–144)
WBC # BLD AUTO: 8 10E3/UL (ref 4–11)

## 2022-11-10 PROCEDURE — 86140 C-REACTIVE PROTEIN: CPT | Performed by: INTERNAL MEDICINE

## 2022-11-10 PROCEDURE — 82248 BILIRUBIN DIRECT: CPT | Performed by: INTERNAL MEDICINE

## 2022-11-10 PROCEDURE — 85652 RBC SED RATE AUTOMATED: CPT | Performed by: INTERNAL MEDICINE

## 2022-11-10 PROCEDURE — 85004 AUTOMATED DIFF WBC COUNT: CPT | Performed by: INTERNAL MEDICINE

## 2022-11-10 PROCEDURE — 82310 ASSAY OF CALCIUM: CPT | Performed by: INTERNAL MEDICINE

## 2022-11-18 NOTE — PROGRESS NOTES
This is a recent snapshot of the patient's [facility] medical record.  For current drug dose and complete information and questions, call [phone] or In Basket pool, fv home infusion ([id number])  CSN Number:  [id number]

## 2022-11-22 NOTE — PROGRESS NOTES
This is a recent snapshot of the patient's [facility] medical record.  For current drug dose and complete information and questions, call [phone] or In Basket pool, fv home infusion ([id number])  CSN Number:  [id number]

## 2022-11-23 NOTE — PROGRESS NOTES
This is a recent snapshot of the patient's [facility] medical record.  For current drug dose and complete information and questions, call [phone] or In Basket pool, fv home infusion ([id number])  CSN Number:  [id number]

## 2023-01-02 ENCOUNTER — TELEPHONE (OUTPATIENT)
Dept: GASTROENTEROLOGY | Facility: CLINIC | Age: 23
End: 2023-01-02

## 2023-01-02 NOTE — TELEPHONE ENCOUNTER
----- Message from Yamilet Sandoval [facility] sent at 1/2/2023  1:20 PM CST -----  Regarding: Delayed infusion  Jae Moraes last infused Remicade on 12/8 and would be due again 1/5, however, patient is out town and will be infusing late on 1/12. Let us know if any concerns. Thanks,     Yamilet Sandoval, PharmD, [facility]  Direct: [phone]  Main: [phone]  Fax: [phone]

## 2023-01-02 NOTE — TELEPHONE ENCOUNTER
Recommended the patient infuse as close to infusion date as possible. In the future, would recommend the patient infuses earlier than scheduled infusion rather than later.

## 2023-01-06 ENCOUNTER — PATIENT OUTREACH (OUTPATIENT)
Dept: GASTROENTEROLOGY | Facility: CLINIC | Age: 23
End: 2023-01-06

## 2023-01-06 DIAGNOSIS — K51.00 ULCERATIVE PANCOLITIS WITHOUT COMPLICATION (H): Primary | ICD-10-CM

## 2023-01-06 RX ORDER — EPINEPHRINE 1 MG/ML
0.3 INJECTION, SOLUTION, CONCENTRATE INTRAVENOUS EVERY 5 MIN PRN
Status: CANCELLED | OUTPATIENT
Start: 2023-01-06

## 2023-01-06 RX ORDER — ALBUTEROL SULFATE 0.83 MG/ML
2.5 SOLUTION RESPIRATORY (INHALATION)
Status: CANCELLED | OUTPATIENT
Start: 2023-01-06

## 2023-01-06 RX ORDER — DIPHENHYDRAMINE HCL 25 MG
25 CAPSULE ORAL ONCE
Status: CANCELLED
Start: 2023-01-06 | End: 2023-01-06

## 2023-01-06 RX ORDER — ACETAMINOPHEN 325 MG/1
650 TABLET ORAL ONCE
Status: CANCELLED
Start: 2023-01-06 | End: 2023-01-06

## 2023-01-06 RX ORDER — METHYLPREDNISOLONE SODIUM SUCCINATE 125 MG/2ML
32 INJECTION, POWDER, LYOPHILIZED, FOR SOLUTION INTRAMUSCULAR; INTRAVENOUS ONCE
Status: CANCELLED | OUTPATIENT
Start: 2023-01-06 | End: 2023-01-06

## 2023-01-06 RX ORDER — ALBUTEROL SULFATE 90 UG/1
1-2 AEROSOL, METERED RESPIRATORY (INHALATION)
Status: CANCELLED
Start: 2023-01-06

## 2023-01-06 RX ORDER — METHYLPREDNISOLONE SODIUM SUCCINATE 125 MG/2ML
125 INJECTION, POWDER, LYOPHILIZED, FOR SOLUTION INTRAMUSCULAR; INTRAVENOUS
Status: CANCELLED
Start: 2023-01-06

## 2023-01-06 RX ORDER — MEPERIDINE HYDROCHLORIDE 25 MG/ML
25 INJECTION INTRAMUSCULAR; INTRAVENOUS; SUBCUTANEOUS EVERY 30 MIN PRN
Status: CANCELLED | OUTPATIENT
Start: 2023-01-06

## 2023-01-06 RX ORDER — DIPHENHYDRAMINE HYDROCHLORIDE 50 MG/ML
50 INJECTION INTRAMUSCULAR; INTRAVENOUS
Status: CANCELLED
Start: 2023-01-06

## 2023-01-06 NOTE — PROGRESS NOTES
Remicade therapy plan orders ; plan has been updated. Patient remains on the same medication regimen. Standing lab orders placed, including BMP. Hepatitis B serologies from  showed that patient is not immune. Quant Gold TB test negative in 2022. Next office visit needs to be scheduled. Was last seen in clinic 10/19/22.

## 2023-01-12 ENCOUNTER — LAB REQUISITION (OUTPATIENT)
Dept: LAB | Facility: CLINIC | Age: 23
End: 2023-01-12
Payer: COMMERCIAL

## 2023-01-12 DIAGNOSIS — K51.90 ULCERATIVE COLITIS, UNSPECIFIED, WITHOUT COMPLICATIONS (H): ICD-10-CM

## 2023-01-12 LAB
ALBUMIN SERPL-MCNC: 3.8 G/DL (ref 3.4–5)
ALP SERPL-CCNC: 71 U/L (ref 40–150)
ALT SERPL W P-5'-P-CCNC: 106 U/L (ref 0–70)
ANION GAP SERPL CALCULATED.3IONS-SCNC: 5 MMOL/L (ref 3–14)
AST SERPL W P-5'-P-CCNC: 53 U/L (ref 0–45)
BASOPHILS # BLD AUTO: 0.1 10E3/UL (ref 0–0.2)
BASOPHILS NFR BLD AUTO: 1 %
BILIRUB DIRECT SERPL-MCNC: 0.1 MG/DL (ref 0–0.2)
BILIRUB SERPL-MCNC: 0.9 MG/DL (ref 0.2–1.3)
BUN SERPL-MCNC: 10 MG/DL (ref 7–30)
CALCIUM SERPL-MCNC: 9.4 MG/DL (ref 8.5–10.1)
CHLORIDE BLD-SCNC: 105 MMOL/L (ref 94–109)
CO2 SERPL-SCNC: 29 MMOL/L (ref 20–32)
CREAT SERPL-MCNC: 1.03 MG/DL (ref 0.66–1.25)
CRP SERPL-MCNC: <2.9 MG/L (ref 0–8)
EOSINOPHIL # BLD AUTO: 0.2 10E3/UL (ref 0–0.7)
EOSINOPHIL NFR BLD AUTO: 2 %
ERYTHROCYTE [DISTWIDTH] IN BLOOD BY AUTOMATED COUNT: 13 % (ref 10–15)
ERYTHROCYTE [SEDIMENTATION RATE] IN BLOOD BY WESTERGREN METHOD: 4 MM/HR (ref 0–15)
GFR SERPL CREATININE-BSD FRML MDRD: >90 ML/MIN/1.73M2
GLUCOSE BLD-MCNC: 78 MG/DL (ref 70–99)
HCT VFR BLD AUTO: 53.1 % (ref 40–53)
HGB BLD-MCNC: 18 G/DL (ref 13.3–17.7)
HOLD SPECIMEN: NORMAL
IMM GRANULOCYTES # BLD: 0 10E3/UL
IMM GRANULOCYTES NFR BLD: 0 %
LYMPHOCYTES # BLD AUTO: 3.4 10E3/UL (ref 0.8–5.3)
LYMPHOCYTES NFR BLD AUTO: 37 %
MCH RBC QN AUTO: 30.1 PG (ref 26.5–33)
MCHC RBC AUTO-ENTMCNC: 33.9 G/DL (ref 31.5–36.5)
MCV RBC AUTO: 89 FL (ref 78–100)
MONOCYTES # BLD AUTO: 0.8 10E3/UL (ref 0–1.3)
MONOCYTES NFR BLD AUTO: 9 %
NEUTROPHILS # BLD AUTO: 4.7 10E3/UL (ref 1.6–8.3)
NEUTROPHILS NFR BLD AUTO: 51 %
NRBC # BLD AUTO: 0 10E3/UL
NRBC BLD AUTO-RTO: 0 /100
PLATELET # BLD AUTO: 291 10E3/UL (ref 150–450)
POTASSIUM BLD-SCNC: 4.1 MMOL/L (ref 3.4–5.3)
PROT SERPL-MCNC: 8.1 G/DL (ref 6.8–8.8)
RBC # BLD AUTO: 5.98 10E6/UL (ref 4.4–5.9)
SODIUM SERPL-SCNC: 139 MMOL/L (ref 133–144)
WBC # BLD AUTO: 9.2 10E3/UL (ref 4–11)

## 2023-01-12 PROCEDURE — 85025 COMPLETE CBC W/AUTO DIFF WBC: CPT | Performed by: INTERNAL MEDICINE

## 2023-01-12 PROCEDURE — 85652 RBC SED RATE AUTOMATED: CPT | Performed by: INTERNAL MEDICINE

## 2023-01-12 PROCEDURE — 82248 BILIRUBIN DIRECT: CPT | Performed by: INTERNAL MEDICINE

## 2023-01-12 PROCEDURE — 80053 COMPREHEN METABOLIC PANEL: CPT | Performed by: INTERNAL MEDICINE

## 2023-01-12 PROCEDURE — 86140 C-REACTIVE PROTEIN: CPT | Performed by: INTERNAL MEDICINE

## 2023-02-28 NOTE — PROGRESS NOTES
This is a recent snapshot of the patient's [facility] medical record.  For current drug dose and complete information and questions, call [phone] or In Basket pool, fv home infusion ([id number])  CSN Number:  [id number]

## 2023-03-08 ENCOUNTER — LAB REQUISITION (OUTPATIENT)
Dept: LAB | Facility: CLINIC | Age: 23
End: 2023-03-08
Payer: COMMERCIAL

## 2023-03-08 DIAGNOSIS — K51.90 ULCERATIVE COLITIS, UNSPECIFIED, WITHOUT COMPLICATIONS (H): ICD-10-CM

## 2023-03-08 LAB
ALBUMIN SERPL BCG-MCNC: 4.6 G/DL (ref 3.5–5.2)
ALP SERPL-CCNC: 66 U/L (ref 40–129)
ALT SERPL W P-5'-P-CCNC: 47 U/L (ref 10–50)
ANION GAP SERPL CALCULATED.3IONS-SCNC: 11 MMOL/L (ref 7–15)
AST SERPL W P-5'-P-CCNC: 36 U/L (ref 10–50)
BASOPHILS # BLD AUTO: 0 10E3/UL (ref 0–0.2)
BASOPHILS NFR BLD AUTO: 1 %
BILIRUB DIRECT SERPL-MCNC: <0.2 MG/DL (ref 0–0.3)
BILIRUB SERPL-MCNC: 0.7 MG/DL
BUN SERPL-MCNC: 10.1 MG/DL (ref 6–20)
CALCIUM SERPL-MCNC: 9.8 MG/DL (ref 8.6–10)
CHLORIDE SERPL-SCNC: 102 MMOL/L (ref 98–107)
CREAT SERPL-MCNC: 1.03 MG/DL (ref 0.67–1.17)
CRP SERPL-MCNC: <3 MG/L
DEPRECATED HCO3 PLAS-SCNC: 25 MMOL/L (ref 22–29)
EOSINOPHIL # BLD AUTO: 0.2 10E3/UL (ref 0–0.7)
EOSINOPHIL NFR BLD AUTO: 2 %
ERYTHROCYTE [DISTWIDTH] IN BLOOD BY AUTOMATED COUNT: 12.6 % (ref 10–15)
ERYTHROCYTE [SEDIMENTATION RATE] IN BLOOD BY WESTERGREN METHOD: 3 MM/HR (ref 0–15)
GFR SERPL CREATININE-BSD FRML MDRD: >90 ML/MIN/1.73M2
GLUCOSE SERPL-MCNC: 85 MG/DL (ref 70–99)
HCT VFR BLD AUTO: 51.6 % (ref 40–53)
HGB BLD-MCNC: 17.4 G/DL (ref 13.3–17.7)
HOLD SPECIMEN: NORMAL
IMM GRANULOCYTES # BLD: 0.1 10E3/UL
IMM GRANULOCYTES NFR BLD: 1 %
LYMPHOCYTES # BLD AUTO: 3.3 10E3/UL (ref 0.8–5.3)
LYMPHOCYTES NFR BLD AUTO: 39 %
MCH RBC QN AUTO: 30.1 PG (ref 26.5–33)
MCHC RBC AUTO-ENTMCNC: 33.7 G/DL (ref 31.5–36.5)
MCV RBC AUTO: 89 FL (ref 78–100)
MONOCYTES # BLD AUTO: 0.7 10E3/UL (ref 0–1.3)
MONOCYTES NFR BLD AUTO: 8 %
NEUTROPHILS # BLD AUTO: 4.2 10E3/UL (ref 1.6–8.3)
NEUTROPHILS NFR BLD AUTO: 49 %
NRBC # BLD AUTO: 0 10E3/UL
NRBC BLD AUTO-RTO: 0 /100
PLATELET # BLD AUTO: 296 10E3/UL (ref 150–450)
POTASSIUM SERPL-SCNC: 4.2 MMOL/L (ref 3.4–5.3)
PROT SERPL-MCNC: 7.7 G/DL (ref 6.4–8.3)
RBC # BLD AUTO: 5.79 10E6/UL (ref 4.4–5.9)
SODIUM SERPL-SCNC: 138 MMOL/L (ref 136–145)
WBC # BLD AUTO: 8.4 10E3/UL (ref 4–11)

## 2023-03-08 PROCEDURE — 82248 BILIRUBIN DIRECT: CPT | Performed by: INTERNAL MEDICINE

## 2023-03-08 PROCEDURE — 86140 C-REACTIVE PROTEIN: CPT | Performed by: INTERNAL MEDICINE

## 2023-03-08 PROCEDURE — 85004 AUTOMATED DIFF WBC COUNT: CPT | Performed by: INTERNAL MEDICINE

## 2023-03-08 PROCEDURE — 85652 RBC SED RATE AUTOMATED: CPT | Performed by: INTERNAL MEDICINE

## 2023-03-08 PROCEDURE — 80053 COMPREHEN METABOLIC PANEL: CPT | Performed by: INTERNAL MEDICINE

## 2023-04-12 ENCOUNTER — VIRTUAL VISIT (OUTPATIENT)
Dept: GASTROENTEROLOGY | Facility: CLINIC | Age: 23
End: 2023-04-12
Payer: COMMERCIAL

## 2023-04-12 DIAGNOSIS — K51.00 ULCERATIVE PANCOLITIS WITHOUT COMPLICATION (H): Primary | ICD-10-CM

## 2023-04-12 PROCEDURE — 99214 OFFICE O/P EST MOD 30 MIN: CPT | Mod: VID | Performed by: PHYSICIAN ASSISTANT

## 2023-04-12 ASSESSMENT — PAIN SCALES - GENERAL: PAINLEVEL: NO PAIN (0)

## 2023-04-12 NOTE — PROGRESS NOTES
Virtual Visit Details    Type of service:  Video Visit     Originating Location (pt. Location): Home    Distant Location (provider location):  Off-site  Platform used for Video Visit: [facility] UC follow up       PATIENT: Jae Sparks    MRN: [id number]    Date of Birth 2000    Tel: [phone] (home)     PCP: No primary care provider on file.     HPI: Mr. Sparks is a 20 year old year old male here to establish care for ulcerative pancolitis.     UC history    Jae was diagnosed with pan-UC in late 2017. He was started on prednisone and Humira 8/2017. He felt somewhat better on Humira but lost response about a week with continued symptoms. Trough level in February 2018 -undetectable Humira level without antibodies and dose changed to weekly 2/2018.     Could not continue with self injections of Humira, so switched to Inflectra May 2020 (Dr. Landeros,  in [place]). Dose increased to 7.5 mg/kg q8 w when level was low (4.58, 0 ATIs) and FC was elevated at 441.     Last inflectra dose was 8 weeks ago.      Macroscopic extent of disease (most recent) E3    Current UC symptoms    Bowel frequency in day 3 (in the morning)   Bowel frequency in night 0  Urgency of defecation occasional  Blood in stool none  General well being 0 = very well  Extracolonic features (multiple select) none     Constitutional symptoms:  Fever NO  Weight loss NO    Noteworthy diet history- no dairy, low fiber     Other GI symptoms present none    Total number of IBD surgeries (except perianal): 0    Current IBD Medications:  Inflectra    Past IBD Medications:   Prednisone  Humira    Interval history, 12/2020  Doing well. Next inflectra on 1/18/2021. No breakthrough symptoms.     Current UC symptoms    Bowel frequency in day 2-3   Bowel frequency in night 0  Urgency of defecation occasional  Blood in stool none  General well being 0 = very well  Extracolonic features (multiple select) none     Interval history,  11/2021  A couple days prior to inflectra may feel some urgency.       Current UC symptoms  Bowel frequency in day 1-3   Bowel frequency in night 0  Urgency of defecation occasional  Blood in stool none  General well being 0 = very well  Extracolonic features (multiple select) none    Interval history, 10/2022  IFX level was only 0.7 in 12/2021 and his IFX was increased to 10 mg/kg q4w.     Current UC symptoms  Bowel frequency in day 1-2  Bowel frequency in night 0  Urgency of defecation no  Blood in stool none  General well being 0 = very well  Extracolonic features (multiple select) none    Interval history, 4/12/23  1-2 stools per day. Formed, no blood. No urgency.      Current UC symptoms  Bowel frequency in day 1-2  Bowel frequency in night 0  Urgency of defecation no  Blood in stool none  General well being 0 = very well  Extracolonic features (multiple select) none      Past Medical History:   Diagnosis Date     Uncomplicated asthma         Past Surgical History:   Procedure Laterality Date     NO HISTORY OF SURGERY         Social History     Tobacco Use     Smoking status: Never     Smokeless tobacco: Never   Vaping Use     Vaping status: Not on file   Substance Use Topics     Alcohol use: Not Currently       Family History   Problem Relation Age of Onset     Inflammatory Bowel Disease No family hx of      Autoimmune Disease No family hx of      Colon Cancer No family hx of        Allergies   Allergen Reactions     Pollen Extract Itching     Cantaloupe (Diagnostic) Hives and Itching     Cats         Outpatient Encounter Medications as of 4/12/2023   Medication Sig Dispense Refill     albuterol (PROAIR HFA/PROVENTIL HFA/VENTOLIN HFA) 108 (90 Base) MCG/ACT inhaler Inhale 1-2 puffs into the lungs every 4 hours as needed       fexofenadine (ALLEGRA) 180 MG tablet Take 180 mg by mouth daily as needed for allergies (in spring and summer)       fluticasone furoate 27.5 MCG/SPRAY nasal spray Spray 2 sprays into both  nostrils daily as needed for rhinitis or allergies (seasonally)       omeprazole (PRILOSEC) 40 MG DR capsule Take 40 mg by mouth daily as needed        No facility-administered encounter medications on file as of 4/12/2023.     NSAID  NO    Review of Systems  Complete 10 System ROS performed. All are negative except as documented below, in the HPI, or in patient questionnaire from today's visit.    1) Constitutional: No fevers, chills, night sweats or malaise, weight loss or gain  2) Skin: No rash  3) Pulmonary: No wheeze, SOB, cough, sputum or hemoptysis  4) Cardiovascular: No Chest pain or palpitations  5) Genitourinary: No blood in urine or dysuria  6) Endocrine: No increased sweating, hunger, thirst or thyroid problems  7) Hematologic: No bruising and easy bleeding  8) Musculoskeletal: no new pain in joints or limitation in ROM  9) Neurologic: No dizziness, paresthesias or weakness or falls  10) Psychiatric:  not depressed/anxious, no sleep problems    PHYSICAL EXAM  Vitals: There were no vitals taken for this visit.    No Pain (0)     General appearance  Healthy appearing adult, in no acute distress     Eyes  Sclera anicteric  Pupils round and reactive to light     Ears, nose, mouth and throat  No obvious external lesions of ears and nose  Hearing intact     Neck  Symmetric  No obvious external lesions     Respiratory  Normal respiration, no use of accessory muscles      MSK  Gait normal     Skin  No rashes or jaundice      Psychiatric  Oriented to person, place and time  Appropriate mood and affect.   DATA:  Reviewed in detail past documentation, medications and prior workup available in electronic health records or through outside records.    PERTINENT STUDIES:  Tuberculosis: QFN neg 12/2019  HBV serology neg in 1/2018    DRUG MONITORING  Biologic concentration: IFX trough 4.58 (ATIs were not tested, given presence of drug level). 12/201: IFX trough was only 0.7, 0 ATIs. Increased to 10 mg/kg every 4 weeks.      5/16/2016 Flex sig: Signs of colonic inflammation identified in the left colon predominantly, likely beyond  Bx - mild active colitis, favored to be a resolving or self limited infectious colitis    7/10/2017 Flex sig: Moderate colitis from rectum to 40cm consistent with ulcerative colitis.  Bx - chronic colitis, moderately active.  Treated with lialda, an ti-TNF agent discussed but not started.    8/2/2017 CT Abdomen/Pelvis: ? Acute pyelonephritis, diffuse colon wall thickening.    8/4/2017 Sigmoidoscopy: Punched out deep ulceration/edema/erythema up to 70 cm. Biopsies take from mid-descending, distal descending, proximal sigmoid, distal sigmoid, and rectum.    6/7/2018 Colonoscopy for IBD disease assessment:  Scarring seen throughout the colon c/w healed ulcerative colitis. Scattered pseudopolyps seen throughout the colon, most marked in the ascending colon and cecum. Mild pinpoint erosions were seen in the terminal ileum. Random biopsies were taken throughout.   Endoscopic examination was performed to the Terminal ileum. This was for assessment of ulcerative colitis disease activity.  The Lee score for each colonic segment is below:  Rectum: 0 (normal) - normal path  Sigmoid colon: 0 (normal) - normal path  Descending colon: 0 (normal) - normal path  Transverse colon: 0 (normal) - normal path  Ascending colon: 0 (normal) - focal active colitis  Cecum: 0 (normal) - normal path  Terminal ileum: Mild pinpoint erosions. - focal active ileitis - ?medication or prep effect  Based on your overall colonoscopy findings, you have colitis that is Completely healed (Lee 0), though there is possible mild terminal ileitis.     IMPRESSION:    DIAGNOSIS:  # E3 UC in clinical remission on inflectra 10 mg/kg q4w in deep remission  # IBD Health Care Maintenance  Mr. Sparks is a 22 year old here with E3 UC in deep remission (icscope in 2018 showed Lee 0) on Inflectra 10 mg/kg q4w. Normal fecal yun 10/2022. We will plan for  the following:    PLAN:  ---Continue IFX 10 mg/kg every 4 weeks  ---Dermatology for FBSE   ---Colon cancer screening to start in 2025    Misc:  -- Avoid tobacco use  -- Avoid NSAIDs as there is potentially a 25% chance of causing an IBD flare  -- Vit D Supplementation      KENNETH VelázquezC  Division of Gastroenterology, Hepatology and Nutrition  [facility]

## 2023-04-12 NOTE — LETTER
4/12/2023         RE: Jae Sparks  [address]  [place] [address]        Dear Colleague,    Thank you for referring your patient, Jae Sparks, to the [facility] [place]. Please see a copy of my visit note below.    [facility] UC follow up       PATIENT: Jae Sparks    MRN: [id number]    Date of Birth 2000    Tel: [phone] (home)     PCP: No primary care provider on file.     HPI: Mr. Sparks is a 20 year old year old male here to establish care for ulcerative pancolitis.     UC history    Jae was diagnosed with pan-UC in late 2017. He was started on prednisone and Humira 8/2017. He felt somewhat better on Humira but lost response about a week with continued symptoms. Trough level in February 2018 -undetectable Humira level without antibodies and dose changed to weekly 2/2018.     Could not continue with self injections of Humira, so switched to Inflectra May 2020 (Dr. Landeros,  in [place]). Dose increased to 7.5 mg/kg q8 w when level was low (4.58, 0 ATIs) and FC was elevated at 441.     Last inflectra dose was 8 weeks ago.      Macroscopic extent of disease (most recent) E3    Current UC symptoms    Bowel frequency in day 3 (in the morning)   Bowel frequency in night 0  Urgency of defecation occasional  Blood in stool none  General well being 0 = very well  Extracolonic features (multiple select) none     Constitutional symptoms:  Fever NO  Weight loss NO    Noteworthy diet history- no dairy, low fiber     Other GI symptoms present none    Total number of IBD surgeries (except perianal): 0    Current IBD Medications:  Inflectra    Past IBD Medications:   Prednisone  Humira    Interval history, 12/2020  Doing well. Next inflectra on 1/18/2021. No breakthrough symptoms.     Current UC symptoms    Bowel frequency in day 2-3   Bowel frequency in night 0  Urgency of defecation occasional  Blood in stool none  General well being 0 =  very well  Extracolonic features (multiple select) none     Interval history, 11/2021  A couple days prior to inflectra may feel some urgency.       Current UC symptoms  Bowel frequency in day 1-3   Bowel frequency in night 0  Urgency of defecation occasional  Blood in stool none  General well being 0 = very well  Extracolonic features (multiple select) none    Interval history, 10/2022  IFX level was only 0.7 in 12/2021 and his IFX was increased to 10 mg/kg q4w.     Current UC symptoms  Bowel frequency in day 1-2  Bowel frequency in night 0  Urgency of defecation no  Blood in stool none  General well being 0 = very well  Extracolonic features (multiple select) none    Interval history, 4/12/23  1-2 stools per day. Formed, no blood. No urgency.      Current UC symptoms  Bowel frequency in day 1-2  Bowel frequency in night 0  Urgency of defecation no  Blood in stool none  General well being 0 = very well  Extracolonic features (multiple select) none      Past Medical History:   Diagnosis Date    Uncomplicated asthma         Past Surgical History:   Procedure Laterality Date    NO HISTORY OF SURGERY         Social History     Tobacco Use    Smoking status: Never    Smokeless tobacco: Never   Vaping Use    Vaping status: Not on file   Substance Use Topics    Alcohol use: Not Currently       Family History   Problem Relation Age of Onset    Inflammatory Bowel Disease No family hx of     Autoimmune Disease No family hx of     Colon Cancer No family hx of        Allergies   Allergen Reactions    Pollen Extract Itching    Cantaloupe (Diagnostic) Hives and Itching    Cats         Outpatient Encounter Medications as of 4/12/2023   Medication Sig Dispense Refill    albuterol (PROAIR HFA/PROVENTIL HFA/VENTOLIN HFA) 108 (90 Base) MCG/ACT inhaler Inhale 1-2 puffs into the lungs every 4 hours as needed      fexofenadine (ALLEGRA) 180 MG tablet Take 180 mg by mouth daily as needed for allergies (in spring and summer)       fluticasone furoate 27.5 MCG/SPRAY nasal spray Spray 2 sprays into both nostrils daily as needed for rhinitis or allergies (seasonally)      omeprazole (PRILOSEC) 40 MG DR capsule Take 40 mg by mouth daily as needed        No facility-administered encounter medications on file as of 4/12/2023.     NSAID  NO    Review of Systems  Complete 10 System ROS performed. All are negative except as documented below, in the HPI, or in patient questionnaire from today's visit.    1) Constitutional: No fevers, chills, night sweats or malaise, weight loss or gain  2) Skin: No rash  3) Pulmonary: No wheeze, SOB, cough, sputum or hemoptysis  4) Cardiovascular: No Chest pain or palpitations  5) Genitourinary: No blood in urine or dysuria  6) Endocrine: No increased sweating, hunger, thirst or thyroid problems  7) Hematologic: No bruising and easy bleeding  8) Musculoskeletal: no new pain in joints or limitation in ROM  9) Neurologic: No dizziness, paresthesias or weakness or falls  10) Psychiatric:  not depressed/anxious, no sleep problems    PHYSICAL EXAM  Vitals: There were no vitals taken for this visit.    No Pain (0)     General appearance  Healthy appearing adult, in no acute distress     Eyes  Sclera anicteric  Pupils round and reactive to light     Ears, nose, mouth and throat  No obvious external lesions of ears and nose  Hearing intact     Neck  Symmetric  No obvious external lesions     Respiratory  Normal respiration, no use of accessory muscles      MSK  Gait normal     Skin  No rashes or jaundice      Psychiatric  Oriented to person, place and time  Appropriate mood and affect.   DATA:  Reviewed in detail past documentation, medications and prior workup available in electronic health records or through outside records.    PERTINENT STUDIES:  Tuberculosis: QFN neg 12/2019  HBV serology neg in 1/2018    DRUG MONITORING  Biologic concentration: IFX trough 4.58 (ATIs were not tested, given presence of drug level). 12/201:  IFX trough was only 0.7, 0 ATIs. Increased to 10 mg/kg every 4 weeks.     5/16/2016 Flex sig: Signs of colonic inflammation identified in the left colon predominantly, likely beyond  Bx - mild active colitis, favored to be a resolving or self limited infectious colitis    7/10/2017 Flex sig: Moderate colitis from rectum to 40cm consistent with ulcerative colitis.  Bx - chronic colitis, moderately active.  Treated with lialda, an ti-TNF agent discussed but not started.    8/2/2017 CT Abdomen/Pelvis: ? Acute pyelonephritis, diffuse colon wall thickening.    8/4/2017 Sigmoidoscopy: Punched out deep ulceration/edema/erythema up to 70 cm. Biopsies take from mid-descending, distal descending, proximal sigmoid, distal sigmoid, and rectum.    6/7/2018 Colonoscopy for IBD disease assessment:  Scarring seen throughout the colon c/w healed ulcerative colitis. Scattered pseudopolyps seen throughout the colon, most marked in the ascending colon and cecum. Mild pinpoint erosions were seen in the terminal ileum. Random biopsies were taken throughout.   Endoscopic examination was performed to the Terminal ileum. This was for assessment of ulcerative colitis disease activity.  The Lee score for each colonic segment is below:  Rectum: 0 (normal) - normal path  Sigmoid colon: 0 (normal) - normal path  Descending colon: 0 (normal) - normal path  Transverse colon: 0 (normal) - normal path  Ascending colon: 0 (normal) - focal active colitis  Cecum: 0 (normal) - normal path  Terminal ileum: Mild pinpoint erosions. - focal active ileitis - ?medication or prep effect  Based on your overall colonoscopy findings, you have colitis that is Completely healed (Lee 0), though there is possible mild terminal ileitis.     IMPRESSION:    DIAGNOSIS:  # E3 UC in clinical remission on inflectra 10 mg/kg q4w in deep remission  # IBD Health Care Maintenance  Mr. Sparks is a 22 year old here with E3 UC in deep remission (icscope in 2018 showed Lee 0)  on Inflectra 10 mg/kg q4w. Normal fecal yun 10/2022. We will plan for the following:    PLAN:  ---Continue IFX 10 mg/kg every 4 weeks  ---Dermatology for FBSE   ---Colon cancer screening to start in 2025    Misc:  -- Avoid tobacco use  -- Avoid NSAIDs as there is potentially a 25% chance of causing an IBD flare  -- Vit D Supplementation          Again, thank you for allowing me to participate in the care of your patient.      Sincerely,    Andrew Winter PA-C

## 2023-04-12 NOTE — PATIENT INSTRUCTIONS
It was a pleasure taking care of you today.  I've included a brief summary of our discussion and care plan from today's visit below.  Please review this information with your primary care provider.  ______________________________________________________________________    My recommendations are summarized as follows:    -- Continue remicade every 4 weeks  -- Labs every 3 months   -- Next endoscopic assessment: 2025  -- Patient with IBD we recommend supplementation vitamin D 1000 units daily and calcium 500 mg twice daily.  -- Vaccines/immunizations to be updated: pnuemonia vaccine   -- Yearly Dermatology visit for skin check while on immunosuppressive therapy. Can call [phone] to schedule.  -- No NSAIDs (ibuprofen, or anything containing ibuprofen)       For additional resources about inflammatory bowel disease visit [email or web address]     Return to GI Clinic in 6 months to review your progress.    ______________________________________________________________________    How do I schedule labs, imaging studies, or procedures that were ordered in clinic today?     Labs: To schedule lab appointment at the [facility], use my chart or call [phone]. If you have a [place] lab closer to home where you are regularly seen you can give them a call.     Procedures: If a colonoscopy, upper endoscopy, breath test, esophageal manometry, or pH impedence was ordered today, our endoscopy team will call you to schedule this. If you have not heard from our endoscopy team within a week, please call [phone] to schedule.     Imaging Studies: If you were scheduled for a CT scan, X-ray, MRI, ultrasound, HIDA scan or other imaging study, please call [phone] to have this scheduled.     Referral: If a referral to another specialty was ordered, expect a phone call or follow instructions above. If you have not heard from anyone regarding your referral in a week, please call our clinic  to check the status.     Who do I call with any questions after my visit?  Please be in touch if there are any further questions that arise following today's visit.  There are multiple ways to contact your gastroenterology care team.      During business hours, you may reach a Gastroenterology nurse at [phone]    To schedule or reschedule an appointment, please call [phone].     You can always send a secure message through [employer].  [employer] messages are answered by your nurse or doctor typically within 24 hours.  Please allow extra time on weekends and holidays.      For urgent/emergent questions after business hours, you may reach the on-call GI Fellow by contacting the [facility]  at [phone].     How will I get the results of any tests ordered?    You will receive all of your results.  If you have signed up for [employer], any tests ordered at your visit will be available to you after your physician reviews them.  Typically this takes 1-2 weeks.  If there are urgent results that require a change in your care plan, your physician or nurse will call you to discuss the next steps.      What is [employer]?  [employer] is a secure way for you to access all of your healthcare records from the [facility].  It is a web based computer program, so you can sign on to it from any location.  It also allows you to send secure messages to your care team.  I recommend signing up for [employer] access if you have not already done so and are comfortable with using a computer.         Sincerely,    Andrew Winter PA-C  [facility]  Division of Gastroenterology

## 2023-04-12 NOTE — NURSING NOTE
Is the patient currently in the state of [place]? YES    Visit mode:VIDEO    If the visit is dropped, the patient can be reconnected by: VIDEO VISIT: Text to cell phone: [phone]    Will anyone else be joining the visit? NO      How would you like to obtain your AVS? MyChart    Are changes needed to the allergy or medication list? NO    Reason for visit: follow up

## 2023-04-14 ENCOUNTER — TELEPHONE (OUTPATIENT)
Dept: GASTROENTEROLOGY | Facility: CLINIC | Age: 23
End: 2023-04-14
Payer: COMMERCIAL

## 2023-04-26 NOTE — PROGRESS NOTES
This is a recent snapshot of the patient's [facility] medical record.  For current drug dose and complete information and questions, call [phone] or In Basket pool, [facility] home infusion ([id number])  CSN Number:  [id number]

## 2023-05-03 ENCOUNTER — LAB REQUISITION (OUTPATIENT)
Dept: LAB | Facility: CLINIC | Age: 23
End: 2023-05-03
Payer: COMMERCIAL

## 2023-05-03 DIAGNOSIS — K51.90 ULCERATIVE COLITIS, UNSPECIFIED, WITHOUT COMPLICATIONS (H): ICD-10-CM

## 2023-05-03 LAB
ALBUMIN SERPL BCG-MCNC: 4.5 G/DL (ref 3.5–5.2)
ALP SERPL-CCNC: 77 U/L (ref 40–129)
ALT SERPL W P-5'-P-CCNC: 34 U/L (ref 10–50)
ANION GAP SERPL CALCULATED.3IONS-SCNC: 12 MMOL/L (ref 7–15)
AST SERPL W P-5'-P-CCNC: 22 U/L (ref 10–50)
BASOPHILS # BLD AUTO: 0.1 10E3/UL (ref 0–0.2)
BASOPHILS NFR BLD AUTO: 1 %
BILIRUB DIRECT SERPL-MCNC: <0.2 MG/DL (ref 0–0.3)
BILIRUB SERPL-MCNC: 0.5 MG/DL
BUN SERPL-MCNC: 14.5 MG/DL (ref 6–20)
CALCIUM SERPL-MCNC: 9.6 MG/DL (ref 8.6–10)
CHLORIDE SERPL-SCNC: 102 MMOL/L (ref 98–107)
CREAT SERPL-MCNC: 1.08 MG/DL (ref 0.67–1.17)
CRP SERPL-MCNC: <3 MG/L
DEPRECATED HCO3 PLAS-SCNC: 25 MMOL/L (ref 22–29)
EOSINOPHIL # BLD AUTO: 0.3 10E3/UL (ref 0–0.7)
EOSINOPHIL NFR BLD AUTO: 3 %
ERYTHROCYTE [DISTWIDTH] IN BLOOD BY AUTOMATED COUNT: 12.6 % (ref 10–15)
ERYTHROCYTE [SEDIMENTATION RATE] IN BLOOD BY WESTERGREN METHOD: 2 MM/HR (ref 0–15)
GFR SERPL CREATININE-BSD FRML MDRD: >90 ML/MIN/1.73M2
GLUCOSE SERPL-MCNC: 86 MG/DL (ref 70–99)
HCT VFR BLD AUTO: 50.9 % (ref 40–53)
HGB BLD-MCNC: 17.6 G/DL (ref 13.3–17.7)
HOLD SPECIMEN: NORMAL
HOLD SPECIMEN: NORMAL
IMM GRANULOCYTES # BLD: 0 10E3/UL
IMM GRANULOCYTES NFR BLD: 0 %
LYMPHOCYTES # BLD AUTO: 3.4 10E3/UL (ref 0.8–5.3)
LYMPHOCYTES NFR BLD AUTO: 35 %
MCH RBC QN AUTO: 30.6 PG (ref 26.5–33)
MCHC RBC AUTO-ENTMCNC: 34.6 G/DL (ref 31.5–36.5)
MCV RBC AUTO: 89 FL (ref 78–100)
MONOCYTES # BLD AUTO: 0.8 10E3/UL (ref 0–1.3)
MONOCYTES NFR BLD AUTO: 8 %
NEUTROPHILS # BLD AUTO: 5.2 10E3/UL (ref 1.6–8.3)
NEUTROPHILS NFR BLD AUTO: 53 %
NRBC # BLD AUTO: 0 10E3/UL
NRBC BLD AUTO-RTO: 0 /100
PLATELET # BLD AUTO: 296 10E3/UL (ref 150–450)
POTASSIUM SERPL-SCNC: 4.1 MMOL/L (ref 3.4–5.3)
PROT SERPL-MCNC: 7.5 G/DL (ref 6.4–8.3)
RBC # BLD AUTO: 5.75 10E6/UL (ref 4.4–5.9)
SODIUM SERPL-SCNC: 139 MMOL/L (ref 136–145)
WBC # BLD AUTO: 9.6 10E3/UL (ref 4–11)

## 2023-05-03 PROCEDURE — 85025 COMPLETE CBC W/AUTO DIFF WBC: CPT | Performed by: INTERNAL MEDICINE

## 2023-05-03 PROCEDURE — 86140 C-REACTIVE PROTEIN: CPT | Performed by: INTERNAL MEDICINE

## 2023-05-03 PROCEDURE — 85652 RBC SED RATE AUTOMATED: CPT | Performed by: INTERNAL MEDICINE

## 2023-05-03 PROCEDURE — 82248 BILIRUBIN DIRECT: CPT | Performed by: INTERNAL MEDICINE

## 2023-05-03 PROCEDURE — 80053 COMPREHEN METABOLIC PANEL: CPT | Performed by: INTERNAL MEDICINE

## 2023-05-08 ENCOUNTER — HEALTH MAINTENANCE LETTER (OUTPATIENT)
Age: 23
End: 2023-05-08

## 2023-05-08 NOTE — PROGRESS NOTES
This is a recent snapshot of the patient's [facility] medical record.  For current drug dose and complete information and questions, call [phone] or In Basket pool, [facility] home infusion ([id number])  CSN Number:  [id number]

## 2023-06-28 ENCOUNTER — LAB REQUISITION (OUTPATIENT)
Dept: LAB | Facility: CLINIC | Age: 23
End: 2023-06-28
Payer: COMMERCIAL

## 2023-06-28 DIAGNOSIS — K51.90 ULCERATIVE COLITIS, UNSPECIFIED, WITHOUT COMPLICATIONS (H): ICD-10-CM

## 2023-06-28 LAB
ALBUMIN SERPL BCG-MCNC: 4.6 G/DL (ref 3.5–5.2)
ALP SERPL-CCNC: 69 U/L (ref 40–129)
ALT SERPL W P-5'-P-CCNC: 28 U/L (ref 0–70)
ANION GAP SERPL CALCULATED.3IONS-SCNC: 10 MMOL/L (ref 7–15)
AST SERPL W P-5'-P-CCNC: 21 U/L (ref 0–45)
BASOPHILS # BLD AUTO: 0 10E3/UL (ref 0–0.2)
BASOPHILS NFR BLD AUTO: 1 %
BILIRUB DIRECT SERPL-MCNC: <0.2 MG/DL (ref 0–0.3)
BILIRUB SERPL-MCNC: 0.7 MG/DL
BUN SERPL-MCNC: 8.3 MG/DL (ref 6–20)
CALCIUM SERPL-MCNC: 9.5 MG/DL (ref 8.6–10)
CHLORIDE SERPL-SCNC: 103 MMOL/L (ref 98–107)
CREAT SERPL-MCNC: 1.05 MG/DL (ref 0.67–1.17)
CRP SERPL-MCNC: <3 MG/L
DEPRECATED HCO3 PLAS-SCNC: 27 MMOL/L (ref 22–29)
EOSINOPHIL # BLD AUTO: 0.1 10E3/UL (ref 0–0.7)
EOSINOPHIL NFR BLD AUTO: 1 %
ERYTHROCYTE [DISTWIDTH] IN BLOOD BY AUTOMATED COUNT: 12.8 % (ref 10–15)
ERYTHROCYTE [SEDIMENTATION RATE] IN BLOOD BY WESTERGREN METHOD: 12 MM/HR (ref 0–15)
GFR SERPL CREATININE-BSD FRML MDRD: >90 ML/MIN/1.73M2
GLUCOSE SERPL-MCNC: 102 MG/DL (ref 70–99)
HCT VFR BLD AUTO: 50.8 % (ref 40–53)
HGB BLD-MCNC: 17.6 G/DL (ref 13.3–17.7)
IMM GRANULOCYTES # BLD: 0 10E3/UL
IMM GRANULOCYTES NFR BLD: 0 %
LYMPHOCYTES # BLD AUTO: 2.8 10E3/UL (ref 0.8–5.3)
LYMPHOCYTES NFR BLD AUTO: 32 %
MCH RBC QN AUTO: 30.7 PG (ref 26.5–33)
MCHC RBC AUTO-ENTMCNC: 34.6 G/DL (ref 31.5–36.5)
MCV RBC AUTO: 89 FL (ref 78–100)
MONOCYTES # BLD AUTO: 0.5 10E3/UL (ref 0–1.3)
MONOCYTES NFR BLD AUTO: 6 %
NEUTROPHILS # BLD AUTO: 5.2 10E3/UL (ref 1.6–8.3)
NEUTROPHILS NFR BLD AUTO: 60 %
NRBC # BLD AUTO: 0 10E3/UL
NRBC BLD AUTO-RTO: 0 /100
PLATELET # BLD AUTO: 308 10E3/UL (ref 150–450)
POTASSIUM SERPL-SCNC: 3.8 MMOL/L (ref 3.4–5.3)
PROT SERPL-MCNC: 7.7 G/DL (ref 6.4–8.3)
RBC # BLD AUTO: 5.73 10E6/UL (ref 4.4–5.9)
SODIUM SERPL-SCNC: 140 MMOL/L (ref 136–145)
WBC # BLD AUTO: 8.6 10E3/UL (ref 4–11)

## 2023-06-28 PROCEDURE — 85652 RBC SED RATE AUTOMATED: CPT | Performed by: INTERNAL MEDICINE

## 2023-06-28 PROCEDURE — 86140 C-REACTIVE PROTEIN: CPT | Performed by: INTERNAL MEDICINE

## 2023-06-28 PROCEDURE — 82248 BILIRUBIN DIRECT: CPT | Performed by: INTERNAL MEDICINE

## 2023-06-28 PROCEDURE — 82435 ASSAY OF BLOOD CHLORIDE: CPT | Performed by: INTERNAL MEDICINE

## 2023-06-28 PROCEDURE — 85004 AUTOMATED DIFF WBC COUNT: CPT | Performed by: INTERNAL MEDICINE

## 2023-08-22 ENCOUNTER — LAB REQUISITION (OUTPATIENT)
Dept: LAB | Facility: CLINIC | Age: 23
End: 2023-08-22
Payer: COMMERCIAL

## 2023-08-22 DIAGNOSIS — K51.90 ULCERATIVE COLITIS, UNSPECIFIED, WITHOUT COMPLICATIONS (H): ICD-10-CM

## 2023-08-22 LAB
ALBUMIN SERPL BCG-MCNC: 4.6 G/DL (ref 3.5–5.2)
ALP SERPL-CCNC: 77 U/L (ref 40–129)
ALT SERPL W P-5'-P-CCNC: 32 U/L (ref 0–70)
ANION GAP SERPL CALCULATED.3IONS-SCNC: 12 MMOL/L (ref 7–15)
AST SERPL W P-5'-P-CCNC: 28 U/L (ref 0–45)
BASOPHILS # BLD AUTO: 0.1 10E3/UL (ref 0–0.2)
BASOPHILS NFR BLD AUTO: 1 %
BILIRUB DIRECT SERPL-MCNC: <0.2 MG/DL (ref 0–0.3)
BILIRUB SERPL-MCNC: 0.4 MG/DL
BUN SERPL-MCNC: 11.1 MG/DL (ref 6–20)
CALCIUM SERPL-MCNC: 9.7 MG/DL (ref 8.6–10)
CHLORIDE SERPL-SCNC: 100 MMOL/L (ref 98–107)
CREAT SERPL-MCNC: 1 MG/DL (ref 0.67–1.17)
CRP SERPL-MCNC: <3 MG/L
DEPRECATED HCO3 PLAS-SCNC: 27 MMOL/L (ref 22–29)
EOSINOPHIL # BLD AUTO: 0.2 10E3/UL (ref 0–0.7)
EOSINOPHIL NFR BLD AUTO: 2 %
ERYTHROCYTE [DISTWIDTH] IN BLOOD BY AUTOMATED COUNT: 13.1 % (ref 10–15)
ERYTHROCYTE [SEDIMENTATION RATE] IN BLOOD BY WESTERGREN METHOD: 2 MM/HR (ref 0–15)
GFR SERPL CREATININE-BSD FRML MDRD: >90 ML/MIN/1.73M2
GLUCOSE SERPL-MCNC: 60 MG/DL (ref 70–99)
HCT VFR BLD AUTO: 52.1 % (ref 40–53)
HGB BLD-MCNC: 17.7 G/DL (ref 13.3–17.7)
IMM GRANULOCYTES # BLD: 0 10E3/UL
IMM GRANULOCYTES NFR BLD: 0 %
LYMPHOCYTES # BLD AUTO: 3.5 10E3/UL (ref 0.8–5.3)
LYMPHOCYTES NFR BLD AUTO: 32 %
MCH RBC QN AUTO: 30.7 PG (ref 26.5–33)
MCHC RBC AUTO-ENTMCNC: 34 G/DL (ref 31.5–36.5)
MCV RBC AUTO: 90 FL (ref 78–100)
MONOCYTES # BLD AUTO: 0.9 10E3/UL (ref 0–1.3)
MONOCYTES NFR BLD AUTO: 8 %
NEUTROPHILS # BLD AUTO: 6.3 10E3/UL (ref 1.6–8.3)
NEUTROPHILS NFR BLD AUTO: 57 %
NRBC # BLD AUTO: 0 10E3/UL
NRBC BLD AUTO-RTO: 0 /100
PLATELET # BLD AUTO: 300 10E3/UL (ref 150–450)
POTASSIUM SERPL-SCNC: 3.6 MMOL/L (ref 3.4–5.3)
PROT SERPL-MCNC: 7.8 G/DL (ref 6.4–8.3)
RBC # BLD AUTO: 5.77 10E6/UL (ref 4.4–5.9)
SODIUM SERPL-SCNC: 139 MMOL/L (ref 136–145)
WBC # BLD AUTO: 11.1 10E3/UL (ref 4–11)

## 2023-08-22 PROCEDURE — 85652 RBC SED RATE AUTOMATED: CPT | Performed by: INTERNAL MEDICINE

## 2023-08-22 PROCEDURE — 86140 C-REACTIVE PROTEIN: CPT | Performed by: INTERNAL MEDICINE

## 2023-08-22 PROCEDURE — 80053 COMPREHEN METABOLIC PANEL: CPT | Performed by: INTERNAL MEDICINE

## 2023-08-22 PROCEDURE — 82248 BILIRUBIN DIRECT: CPT | Performed by: INTERNAL MEDICINE

## 2023-08-22 PROCEDURE — 85025 COMPLETE CBC W/AUTO DIFF WBC: CPT | Performed by: INTERNAL MEDICINE

## 2023-08-28 ENCOUNTER — MYC REFILL (OUTPATIENT)
Dept: FAMILY MEDICINE | Facility: CLINIC | Age: 23
End: 2023-08-28
Payer: COMMERCIAL

## 2023-08-28 DIAGNOSIS — J45.20 MILD INTERMITTENT ASTHMA, UNSPECIFIED WHETHER COMPLICATED: Primary | ICD-10-CM

## 2023-08-29 RX ORDER — ALBUTEROL SULFATE 90 UG/1
1-2 AEROSOL, METERED RESPIRATORY (INHALATION) EVERY 4 HOURS PRN
Qty: 18 G | Refills: 1 | Status: SHIPPED | OUTPATIENT
Start: 2023-08-29

## 2023-08-29 NOTE — TELEPHONE ENCOUNTER
Routing refill request to provider for review/approval because:  See MyChart message from patient.  Shyanne HUFFMAN RN

## 2023-10-23 ENCOUNTER — DOCUMENTATION ONLY (OUTPATIENT)
Dept: PHARMACY | Facility: CLINIC | Age: 23
End: 2023-10-23
Payer: COMMERCIAL

## 2023-10-23 ENCOUNTER — LAB REQUISITION (OUTPATIENT)
Dept: LAB | Facility: CLINIC | Age: 23
End: 2023-10-23
Payer: COMMERCIAL

## 2023-10-23 DIAGNOSIS — K51.90 ULCERATIVE COLITIS, UNSPECIFIED, WITHOUT COMPLICATIONS (H): ICD-10-CM

## 2023-10-23 LAB
ALBUMIN SERPL BCG-MCNC: 4.6 G/DL (ref 3.5–5.2)
ALP SERPL-CCNC: 68 U/L (ref 40–129)
ALT SERPL W P-5'-P-CCNC: 25 U/L (ref 0–70)
ANION GAP SERPL CALCULATED.3IONS-SCNC: 9 MMOL/L (ref 7–15)
AST SERPL W P-5'-P-CCNC: 19 U/L (ref 0–45)
BASOPHILS # BLD AUTO: 0 10E3/UL (ref 0–0.2)
BASOPHILS NFR BLD AUTO: 0 %
BILIRUB DIRECT SERPL-MCNC: <0.2 MG/DL (ref 0–0.3)
BILIRUB SERPL-MCNC: 0.4 MG/DL
BUN SERPL-MCNC: 11.6 MG/DL (ref 6–20)
CALCIUM SERPL-MCNC: 9.5 MG/DL (ref 8.6–10)
CHLORIDE SERPL-SCNC: 101 MMOL/L (ref 98–107)
CREAT SERPL-MCNC: 0.98 MG/DL (ref 0.67–1.17)
CRP SERPL-MCNC: <3 MG/L
DEPRECATED HCO3 PLAS-SCNC: 28 MMOL/L (ref 22–29)
EGFRCR SERPLBLD CKD-EPI 2021: >90 ML/MIN/1.73M2
EOSINOPHIL # BLD AUTO: 0.1 10E3/UL (ref 0–0.7)
EOSINOPHIL NFR BLD AUTO: 2 %
ERYTHROCYTE [DISTWIDTH] IN BLOOD BY AUTOMATED COUNT: 13.2 % (ref 10–15)
ERYTHROCYTE [SEDIMENTATION RATE] IN BLOOD BY WESTERGREN METHOD: 5 MM/HR (ref 0–15)
GLUCOSE SERPL-MCNC: 105 MG/DL (ref 70–99)
HCT VFR BLD AUTO: 51.5 % (ref 40–53)
HGB BLD-MCNC: 17.4 G/DL (ref 13.3–17.7)
IMM GRANULOCYTES # BLD: 0 10E3/UL
IMM GRANULOCYTES NFR BLD: 0 %
LYMPHOCYTES # BLD AUTO: 2.9 10E3/UL (ref 0.8–5.3)
LYMPHOCYTES NFR BLD AUTO: 41 %
MCH RBC QN AUTO: 30.3 PG (ref 26.5–33)
MCHC RBC AUTO-ENTMCNC: 33.8 G/DL (ref 31.5–36.5)
MCV RBC AUTO: 90 FL (ref 78–100)
MONOCYTES # BLD AUTO: 0.4 10E3/UL (ref 0–1.3)
MONOCYTES NFR BLD AUTO: 6 %
NEUTROPHILS # BLD AUTO: 3.7 10E3/UL (ref 1.6–8.3)
NEUTROPHILS NFR BLD AUTO: 51 %
NRBC # BLD AUTO: 0 10E3/UL
NRBC BLD AUTO-RTO: 0 /100
PLATELET # BLD AUTO: 302 10E3/UL (ref 150–450)
POTASSIUM SERPL-SCNC: 4.1 MMOL/L (ref 3.4–5.3)
PROT SERPL-MCNC: 7.8 G/DL (ref 6.4–8.3)
RBC # BLD AUTO: 5.74 10E6/UL (ref 4.4–5.9)
SODIUM SERPL-SCNC: 138 MMOL/L (ref 135–145)
WBC # BLD AUTO: 7.2 10E3/UL (ref 4–11)

## 2023-10-23 PROCEDURE — 85652 RBC SED RATE AUTOMATED: CPT | Performed by: INTERNAL MEDICINE

## 2023-10-23 PROCEDURE — 85014 HEMATOCRIT: CPT | Performed by: INTERNAL MEDICINE

## 2023-10-23 PROCEDURE — 86140 C-REACTIVE PROTEIN: CPT | Performed by: INTERNAL MEDICINE

## 2023-10-23 PROCEDURE — 80048 BASIC METABOLIC PNL TOTAL CA: CPT | Performed by: INTERNAL MEDICINE

## 2023-10-23 PROCEDURE — 80053 COMPREHEN METABOLIC PANEL: CPT | Performed by: INTERNAL MEDICINE

## 2023-10-23 PROCEDURE — 82248 BILIRUBIN DIRECT: CPT | Performed by: INTERNAL MEDICINE

## 2023-10-23 NOTE — PROGRESS NOTES
..Skilled Nurse visit in the Patient Home to administer Remicade.  No recent elevated temperature, fever, chills, productive cough, coughing for 3 weeks or longer or hemoptysis, abnormal vital signs, night sweats, chest pain. No  decrease in your appetite, unexplained weight loss or fatigue.  No other new onset medical symptoms.  Current weight 184lbs.  Peripheral IVleft AC, 1attempt Pre medicated with Claritin 10mg p,o,. Labs drawn CBC dif/plt, CRP, ESR, Hepatic panel, BMP. Infusion completed without complication or reaction. Pt reports therapy iseffective in managing symptoms related to therapy.

## 2023-10-24 ENCOUNTER — VIRTUAL VISIT (OUTPATIENT)
Dept: GASTROENTEROLOGY | Facility: CLINIC | Age: 23
End: 2023-10-24
Attending: PHYSICIAN ASSISTANT
Payer: COMMERCIAL

## 2023-10-24 DIAGNOSIS — K51.00 ULCERATIVE PANCOLITIS WITHOUT COMPLICATION (H): Primary | ICD-10-CM

## 2023-10-24 PROCEDURE — 99214 OFFICE O/P EST MOD 30 MIN: CPT | Mod: VID | Performed by: PHYSICIAN ASSISTANT

## 2023-10-24 ASSESSMENT — PAIN SCALES - GENERAL: PAINLEVEL: NO PAIN (0)

## 2023-10-24 NOTE — PATIENT INSTRUCTIONS
It was a pleasure taking care of you today.  I've included a brief summary of our discussion and care plan from today's visit below.  Please review this information with your primary care provider.  ______________________________________________________________________    My recommendations are summarized as follows:    -- Continue remicade every 4 weeks  -- Labs every 3 months   -- Next endoscopic assessment: 2025  -- Patient with IBD we recommend supplementation vitamin D 1000 units daily and calcium 500 mg twice daily.  -- Vaccines/immunizations to be updated: pnuemonia vaccine   -- Yearly Dermatology visit for skin check while on immunosuppressive therapy. Can call [phone] to schedule.  -- No NSAIDs (ibuprofen, or anything containing ibuprofen)       For additional resources about inflammatory bowel disease visit [email or web address]     Return to GI Clinic in 6 months to review your progress.    ______________________________________________________________________    How do I schedule labs, imaging studies, or procedures that were ordered in clinic today?     Labs: To schedule lab appointment at the [facility], use my chart or call [phone]. If you have a [place] lab closer to home where you are regularly seen you can give them a call.     Procedures: If a colonoscopy, upper endoscopy, breath test, esophageal manometry, or pH impedence was ordered today, our endoscopy team will call you to schedule this. If you have not heard from our endoscopy team within a week, please call [phone] to schedule.     Imaging Studies: If you were scheduled for a CT scan, X-ray, MRI, ultrasound, HIDA scan or other imaging study, please call [phone] to have this scheduled.     Referral: If a referral to another specialty was ordered, expect a phone call or follow instructions above. If you have not heard from anyone regarding your referral in a week, please call our clinic  to check the status.     Who do I call with any questions after my visit?  Please be in touch if there are any further questions that arise following today's visit.  There are multiple ways to contact your gastroenterology care team.      During business hours, you may reach a Gastroenterology nurse at [phone]    To schedule or reschedule an appointment, please call [phone].     You can always send a secure message through [employer]).  [employer]) messages are answered by your nurse or doctor typically within 24 hours.  Please allow extra time on weekends and holidays.      For urgent/emergent questions after business hours, you may reach the on-call GI Fellow by contacting the [facility]  at [phone].     How will I get the results of any tests ordered?    You will receive all of your results.  If you have signed up for [employer], any tests ordered at your visit will be available to you after your physician reviews them.  Typically this takes 1-2 weeks.  If there are urgent results that require a change in your care plan, your physician or nurse will call you to discuss the next steps.      What is [employer] ([employer])?  [employer] ([employer]) is a secure way for you to access all of your healthcare records from the [facility].  It is a web based computer program, so you can sign on to it from any location.  It also allows you to send secure messages to your care team.  I recommend signing up for [employer] ([employer]) access if you have not already done so and are comfortable with using a computer.         Sincerely,    Andrew Winter PA-C  [facility]  Division of Gastroenterology

## 2023-10-24 NOTE — LETTER
10/24/2023         RE: Jae Sparks  [address]  [place] [address]        Dear Colleague,    Thank you for referring your patient, Jae Sparks, to the [facility]STR[facility] [place]. Please see a copy of my visit note below.    [facility] UC follow up       PATIENT: Jae Sparks    MRN: [id number]    Date of Birth 2000    Tel: [phone] (home)     PCP: No primary care provider on file.     HPI: Mr. Sparks is a 23 year old year old male here for follow up of ulcerative pancolitis.     UC history    Jae was diagnosed with pan-UC in late 2017. He was started on prednisone and Humira 8/2017. He felt somewhat better on Humira but lost response about a week with continued symptoms. Trough level in February 2018 -undetectable Humira level without antibodies and dose changed to weekly 2/2018.     Could not continue with self injections of Humira, so switched to Inflectra May 2020 (Dr. Landeros,  in [place]). Dose increased to 7.5 mg/kg q8 w when level was low (4.58, 0 ATIs) and FC was elevated at 441.     Last inflectra dose was 8 weeks ago.      Macroscopic extent of disease (most recent) E3    Current UC symptoms    Bowel frequency in day 3 (in the morning)   Bowel frequency in night 0  Urgency of defecation occasional  Blood in stool none  General well being 0 = very well  Extracolonic features (multiple select) none     Constitutional symptoms:  Fever NO  Weight loss NO    Noteworthy diet history- no dairy, low fiber     Other GI symptoms present none    Total number of IBD surgeries (except perianal): 0    Current IBD Medications:  Inflectra    Past IBD Medications:   Prednisone  Humira    Interval history, 12/2020  Doing well. Next inflectra on 1/18/2021. No breakthrough symptoms.     Current UC symptoms    Bowel frequency in day 2-3   Bowel frequency in night 0  Urgency of defecation occasional  Blood in stool none  General well being 0 = very  well  Extracolonic features (multiple select) none     Interval history, 11/2021  A couple days prior to inflectra may feel some urgency.     Current UC symptoms  Bowel frequency in day 1-3   Bowel frequency in night 0  Urgency of defecation occasional  Blood in stool none  General well being 0 = very well  Extracolonic features (multiple select) none    Interval history, 10/2022  IFX level was only 0.7 in 12/2021 and his IFX was increased to 10 mg/kg q4w.     Current UC symptoms  Bowel frequency in day 1-2  Bowel frequency in night 0  Urgency of defecation no  Blood in stool none  General well being 0 = very well  Extracolonic features (multiple select) none    Interval history, 4/12/23  1-2 stools per day. Formed, no blood. No urgency.      Current UC symptoms  Bowel frequency in day 1-2  Bowel frequency in night 0  Urgency of defecation no  Blood in stool none  General well being 0 = very well  Extracolonic features (multiple select) none    Interval history, 10/24/23  1-2 (occasionally 3) stools per day. Formed, no blood. No urgency.  No skin or joint problems. Twitch in right eye.  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     Current UC symptoms  Bowel frequency in day 1-2  Bowel frequency in night 0  Urgency of defecation no  Blood in stool none  General well being 0 = very well  Extracolonic features (multiple select) none      Past Medical History:   Diagnosis Date    Uncomplicated asthma         Past Surgical History:   Procedure Laterality Date    NO HISTORY OF SURGERY         Social History     Tobacco Use    Smoking status: Never    Smokeless tobacco: Never   Substance Use Topics    Alcohol use: Not Currently       Family History   Problem Relation Age of Onset    Inflammatory Bowel Disease No family hx of     Autoimmune Disease No family hx of     Colon Cancer No family hx of        Allergies   Allergen Reactions    Pollen Extract Itching    Cantaloupe (Diagnostic) Hives and Itching    Cats          Outpatient Encounter Medications as of 10/24/2023   Medication Sig Dispense Refill    albuterol (PROAIR HFA/PROVENTIL HFA/VENTOLIN HFA) 108 (90 Base) MCG/ACT inhaler Inhale 1-2 puffs into the lungs every 4 hours as needed 18 g 1    fexofenadine (ALLEGRA) 180 MG tablet Take 180 mg by mouth daily as needed for allergies (in spring and summer)      fluticasone furoate 27.5 MCG/SPRAY nasal spray Spray 2 sprays into both nostrils daily as needed for rhinitis or allergies (seasonally)      omeprazole (PRILOSEC) 40 MG DR capsule Take 40 mg by mouth daily as needed        No facility-administered encounter medications on file as of 10/24/2023.     NSAID  NO    Review of Systems  Complete 10 System ROS performed. All are negative except as documented below, in the HPI, or in patient questionnaire from today's visit.    1) Constitutional: No fevers, chills, night sweats or malaise, weight loss or gain  2) Skin: No rash  3) Pulmonary: No wheeze, SOB, cough, sputum or hemoptysis  4) Cardiovascular: No Chest pain or palpitations  5) Genitourinary: No blood in urine or dysuria  6) Endocrine: No increased sweating, hunger, thirst or thyroid problems  7) Hematologic: No bruising and easy bleeding  8) Musculoskeletal: no new pain in joints or limitation in ROM  9) Neurologic: No dizziness, paresthesias or weakness or falls  10) Psychiatric:  not depressed/anxious, no sleep problems    PHYSICAL EXAM  Vitals: There were no vitals taken for this visit.    No Pain (0)     General appearance  Healthy appearing adult, in no acute distress     Eyes  Sclera anicteric  Pupils round and reactive to light     Ears, nose, mouth and throat  No obvious external lesions of ears and nose  Hearing intact     Neck  Symmetric  No obvious external lesions     Respiratory  Normal respiration, no use of accessory muscles      MSK  Gait normal     Skin  No rashes or jaundice     Psychiatric  Oriented to person, place and time  Appropriate mood and  affect.   DATA:  Reviewed in detail past documentation, medications and prior workup available in electronic health records or through outside records.    PERTINENT STUDIES:  Tuberculosis: QFN neg 12/2019  HBV serology neg in 1/2018    DRUG MONITORING  Biologic concentration: IFX trough 4.58 (ATIs were not tested, given presence of drug level). 12/201: IFX trough was only 0.7, 0 ATIs. Increased to 10 mg/kg every 4 weeks.     5/16/2016 Flex sig: Signs of colonic inflammation identified in the left colon predominantly, likely beyond  Bx - mild active colitis, favored to be a resolving or self limited infectious colitis    7/10/2017 Flex sig: Moderate colitis from rectum to 40cm consistent with ulcerative colitis.  Bx - chronic colitis, moderately active.  Treated with lialda, an ti-TNF agent discussed but not started.    8/2/2017 CT Abdomen/Pelvis: ? Acute pyelonephritis, diffuse colon wall thickening.    8/4/2017 Sigmoidoscopy: Punched out deep ulceration/edema/erythema up to 70 cm. Biopsies take from mid-descending, distal descending, proximal sigmoid, distal sigmoid, and rectum.    6/7/2018 Colonoscopy for IBD disease assessment:  Scarring seen throughout the colon c/w healed ulcerative colitis. Scattered pseudopolyps seen throughout the colon, most marked in the ascending colon and cecum. Mild pinpoint erosions were seen in the terminal ileum. Random biopsies were taken throughout.   Endoscopic examination was performed to the Terminal ileum. This was for assessment of ulcerative colitis disease activity.  The Lee score for each colonic segment is below:  Rectum: 0 (normal) - normal path  Sigmoid colon: 0 (normal) - normal path  Descending colon: 0 (normal) - normal path  Transverse colon: 0 (normal) - normal path  Ascending colon: 0 (normal) - focal active colitis  Cecum: 0 (normal) - normal path  Terminal ileum: Mild pinpoint erosions. - focal active ileitis - ?medication or prep effect  Based on your overall  colonoscopy findings, you have colitis that is Completely healed (Lee 0), though there is possible mild terminal ileitis.     IMPRESSION:    DIAGNOSIS:  # E3 UC in clinical remission on inflectra 10 mg/kg q4w in deep remission  # IBD Health Care Maintenance  Mr. Sparks is a 22 year old here with E3 UC in deep remission (icscope in 2018 showed Lee 0) on Inflectra 10 mg/kg q4w. Normal fecal yun 10/2022. We will plan for the following:    PLAN:  ---Continue IFX 10 mg/kg every 4 weeks  ---Dermatology for FBSE   ---Colon cancer screening to start in 2025    Misc:  -- Avoid tobacco use  -- Avoid NSAIDs as there is potentially a 25% chance of causing an IBD flare  -- Vit D Supplementation          Again, thank you for allowing me to participate in the care of your patient.      Sincerely,    Andrew Winter PA-C

## 2023-10-24 NOTE — NURSING NOTE
Is the patient currently in the state of [place]? YES    Visit mode:VIDEO    If the visit is dropped, the patient can be reconnected by: VIDEO VISIT: Text to cell phone:   Telephone Information:   Mobile [phone]       Will anyone else be joining the visit? NO  (If patient encounters technical issues they should call [phone]    How would you like to obtain your AVS? MyChart    Are changes needed to the allergy or medication list? No    Reason for visit: RECHECK    Bayron SEGURA

## 2023-10-24 NOTE — PROGRESS NOTES
Virtual Visit Details    Type of service:  Video Visit     Originating Location (pt. Location): Home    Distant Location (provider location):  Off-site  Platform used for Video Visit: [facility] UC follow up       PATIENT: Jae Sparks    MRN: [id number]    Date of Birth 2000    Tel: [phone] (home)     PCP: No primary care provider on file.     HPI: Mr. Sparks is a 23 year old year old male here for follow up of ulcerative pancolitis.     UC history    Jae was diagnosed with pan-UC in late 2017. He was started on prednisone and Humira 8/2017. He felt somewhat better on Humira but lost response about a week with continued symptoms. Trough level in February 2018 -undetectable Humira level without antibodies and dose changed to weekly 2/2018.     Could not continue with self injections of Humira, so switched to Inflectra May 2020 (Dr. Landeros,  in [place]). Dose increased to 7.5 mg/kg q8 w when level was low (4.58, 0 ATIs) and FC was elevated at 441.     Last inflectra dose was 8 weeks ago.      Macroscopic extent of disease (most recent) E3    Current UC symptoms    Bowel frequency in day 3 (in the morning)   Bowel frequency in night 0  Urgency of defecation occasional  Blood in stool none  General well being 0 = very well  Extracolonic features (multiple select) none     Constitutional symptoms:  Fever NO  Weight loss NO    Noteworthy diet history- no dairy, low fiber     Other GI symptoms present none    Total number of IBD surgeries (except perianal): 0    Current IBD Medications:  Inflectra    Past IBD Medications:   Prednisone  Humira    Interval history, 12/2020  Doing well. Next inflectra on 1/18/2021. No breakthrough symptoms.     Current UC symptoms    Bowel frequency in day 2-3   Bowel frequency in night 0  Urgency of defecation occasional  Blood in stool none  General well being 0 = very well  Extracolonic features (multiple select) none     Interval history, 11/2021  A  couple days prior to inflectra may feel some urgency.     Current UC symptoms  Bowel frequency in day 1-3   Bowel frequency in night 0  Urgency of defecation occasional  Blood in stool none  General well being 0 = very well  Extracolonic features (multiple select) none    Interval history, 10/2022  IFX level was only 0.7 in 12/2021 and his IFX was increased to 10 mg/kg q4w.     Current UC symptoms  Bowel frequency in day 1-2  Bowel frequency in night 0  Urgency of defecation no  Blood in stool none  General well being 0 = very well  Extracolonic features (multiple select) none    Interval history, 4/12/23  1-2 stools per day. Formed, no blood. No urgency.      Current UC symptoms  Bowel frequency in day 1-2  Bowel frequency in night 0  Urgency of defecation no  Blood in stool none  General well being 0 = very well  Extracolonic features (multiple select) none    Interval history, 10/24/23  1-2 (occasionally 3) stools per day. Formed, no blood. No urgency.  No skin or joint problems. Twitch in right eye.  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     Current UC symptoms  Bowel frequency in day 1-2  Bowel frequency in night 0  Urgency of defecation no  Blood in stool none  General well being 0 = very well  Extracolonic features (multiple select) none      Past Medical History:   Diagnosis Date    Uncomplicated asthma         Past Surgical History:   Procedure Laterality Date    NO HISTORY OF SURGERY         Social History     Tobacco Use    Smoking status: Never    Smokeless tobacco: Never   Substance Use Topics    Alcohol use: Not Currently       Family History   Problem Relation Age of Onset    Inflammatory Bowel Disease No family hx of     Autoimmune Disease No family hx of     Colon Cancer No family hx of        Allergies   Allergen Reactions    Pollen Extract Itching    Cantaloupe (Diagnostic) Hives and Itching    Cats         Outpatient Encounter Medications as of 10/24/2023   Medication Sig Dispense  Refill    albuterol (PROAIR HFA/PROVENTIL HFA/VENTOLIN HFA) 108 (90 Base) MCG/ACT inhaler Inhale 1-2 puffs into the lungs every 4 hours as needed 18 g 1    fexofenadine (ALLEGRA) 180 MG tablet Take 180 mg by mouth daily as needed for allergies (in spring and summer)      fluticasone furoate 27.5 MCG/SPRAY nasal spray Spray 2 sprays into both nostrils daily as needed for rhinitis or allergies (seasonally)      omeprazole (PRILOSEC) 40 MG DR capsule Take 40 mg by mouth daily as needed        No facility-administered encounter medications on file as of 10/24/2023.     NSAID  NO    Review of Systems  Complete 10 System ROS performed. All are negative except as documented below, in the HPI, or in patient questionnaire from today's visit.    1) Constitutional: No fevers, chills, night sweats or malaise, weight loss or gain  2) Skin: No rash  3) Pulmonary: No wheeze, SOB, cough, sputum or hemoptysis  4) Cardiovascular: No Chest pain or palpitations  5) Genitourinary: No blood in urine or dysuria  6) Endocrine: No increased sweating, hunger, thirst or thyroid problems  7) Hematologic: No bruising and easy bleeding  8) Musculoskeletal: no new pain in joints or limitation in ROM  9) Neurologic: No dizziness, paresthesias or weakness or falls  10) Psychiatric:  not depressed/anxious, no sleep problems    PHYSICAL EXAM  Vitals: There were no vitals taken for this visit.    No Pain (0)     General appearance  Healthy appearing adult, in no acute distress     Eyes  Sclera anicteric  Pupils round and reactive to light     Ears, nose, mouth and throat  No obvious external lesions of ears and nose  Hearing intact     Neck  Symmetric  No obvious external lesions     Respiratory  Normal respiration, no use of accessory muscles      MSK  Gait normal     Skin  No rashes or jaundice     Psychiatric  Oriented to person, place and time  Appropriate mood and affect.   DATA:  Reviewed in detail past documentation, medications and prior  workup available in electronic health records or through outside records.    PERTINENT STUDIES:  Tuberculosis: QFN neg 12/2019  HBV serology neg in 1/2018    DRUG MONITORING  Biologic concentration: IFX trough 4.58 (ATIs were not tested, given presence of drug level). 12/201: IFX trough was only 0.7, 0 ATIs. Increased to 10 mg/kg every 4 weeks.     5/16/2016 Flex sig: Signs of colonic inflammation identified in the left colon predominantly, likely beyond  Bx - mild active colitis, favored to be a resolving or self limited infectious colitis    7/10/2017 Flex sig: Moderate colitis from rectum to 40cm consistent with ulcerative colitis.  Bx - chronic colitis, moderately active.  Treated with lialda, an ti-TNF agent discussed but not started.    8/2/2017 CT Abdomen/Pelvis: ? Acute pyelonephritis, diffuse colon wall thickening.    8/4/2017 Sigmoidoscopy: Punched out deep ulceration/edema/erythema up to 70 cm. Biopsies take from mid-descending, distal descending, proximal sigmoid, distal sigmoid, and rectum.    6/7/2018 Colonoscopy for IBD disease assessment:  Scarring seen throughout the colon c/w healed ulcerative colitis. Scattered pseudopolyps seen throughout the colon, most marked in the ascending colon and cecum. Mild pinpoint erosions were seen in the terminal ileum. Random biopsies were taken throughout.   Endoscopic examination was performed to the Terminal ileum. This was for assessment of ulcerative colitis disease activity.  The Lee score for each colonic segment is below:  Rectum: 0 (normal) - normal path  Sigmoid colon: 0 (normal) - normal path  Descending colon: 0 (normal) - normal path  Transverse colon: 0 (normal) - normal path  Ascending colon: 0 (normal) - focal active colitis  Cecum: 0 (normal) - normal path  Terminal ileum: Mild pinpoint erosions. - focal active ileitis - ?medication or prep effect  Based on your overall colonoscopy findings, you have colitis that is Completely healed (Lee 0),  though there is possible mild terminal ileitis.     IMPRESSION:    DIAGNOSIS:  # E3 UC in clinical remission on inflectra 10 mg/kg q4w in deep remission  # IBD Health Care Maintenance  Mr. Sparks is a 22 year old here with E3 UC in deep remission (icscope in 2018 showed Lee 0) on Inflectra 10 mg/kg q4w. Normal fecal yun 10/2022. We will plan for the following:    PLAN:  ---Continue IFX 10 mg/kg every 4 weeks  ---Dermatology for FBSE   ---Colon cancer screening to start in 2025    Misc:  -- Avoid tobacco use  -- Avoid NSAIDs as there is potentially a 25% chance of causing an IBD flare  -- Vit D Supplementation      Andrew Winter PA-C  Division of Gastroenterology, Hepatology and Nutrition  [facility]

## 2023-10-26 ENCOUNTER — TELEPHONE (OUTPATIENT)
Dept: GASTROENTEROLOGY | Facility: CLINIC | Age: 23
End: 2023-10-26
Payer: COMMERCIAL

## 2023-10-26 NOTE — TELEPHONE ENCOUNTER
Spoke with patient and scheduled the 1 yr follow-up order per Andrew Winter. The patient is scheduled for a video visit on 10/29/24.

## 2023-11-20 ENCOUNTER — DOCUMENTATION ONLY (OUTPATIENT)
Dept: PHARMACY | Facility: CLINIC | Age: 23
End: 2023-11-20
Payer: COMMERCIAL

## 2023-11-20 NOTE — PROGRESS NOTES
..Skilled Nurse visit in the Patient Home to administer Remicade.  No recent elevated temperature, fever, chills, productive cough, coughing for 3 weeks or longer or hemoptysis, abnormal vital signs, night sweats, chest pain. No  decrease in your appetite, unexplained weight loss or fatigue.  No other new onset medical symptoms.  Current weight 184lbs.  Peripheral IVleft AC, 1attempt Pre medicated with Claritin 10mg p,o,. Labs drawn N/A. Infusion completed without complication or reaction. Pt reports therapy iseffective in managing symptoms related to therapy.

## 2023-12-18 ENCOUNTER — DOCUMENTATION ONLY (OUTPATIENT)
Dept: PHARMACY | Facility: CLINIC | Age: 23
End: 2023-12-18
Payer: COMMERCIAL

## 2023-12-18 ENCOUNTER — LAB REQUISITION (OUTPATIENT)
Dept: LAB | Facility: CLINIC | Age: 23
End: 2023-12-18
Payer: COMMERCIAL

## 2023-12-18 DIAGNOSIS — K51.90 ULCERATIVE COLITIS, UNSPECIFIED, WITHOUT COMPLICATIONS (H): ICD-10-CM

## 2023-12-18 LAB
ALBUMIN SERPL BCG-MCNC: 4.4 G/DL (ref 3.5–5.2)
ALP SERPL-CCNC: 66 U/L (ref 40–150)
ALT SERPL W P-5'-P-CCNC: 18 U/L (ref 0–70)
ANION GAP SERPL CALCULATED.3IONS-SCNC: 9 MMOL/L (ref 7–15)
AST SERPL W P-5'-P-CCNC: 22 U/L (ref 0–45)
BASOPHILS # BLD AUTO: 0.1 10E3/UL (ref 0–0.2)
BASOPHILS NFR BLD AUTO: 1 %
BILIRUB DIRECT SERPL-MCNC: <0.2 MG/DL (ref 0–0.3)
BILIRUB SERPL-MCNC: 0.4 MG/DL
BUN SERPL-MCNC: 7.6 MG/DL (ref 6–20)
CALCIUM SERPL-MCNC: 9.1 MG/DL (ref 8.6–10)
CHLORIDE SERPL-SCNC: 103 MMOL/L (ref 98–107)
CREAT SERPL-MCNC: 1 MG/DL (ref 0.67–1.17)
CRP SERPL-MCNC: <3 MG/L
DEPRECATED HCO3 PLAS-SCNC: 28 MMOL/L (ref 22–29)
EGFRCR SERPLBLD CKD-EPI 2021: >90 ML/MIN/1.73M2
EOSINOPHIL # BLD AUTO: 0.1 10E3/UL (ref 0–0.7)
EOSINOPHIL NFR BLD AUTO: 1 %
ERYTHROCYTE [DISTWIDTH] IN BLOOD BY AUTOMATED COUNT: 12.9 % (ref 10–15)
ERYTHROCYTE [SEDIMENTATION RATE] IN BLOOD BY WESTERGREN METHOD: 3 MM/HR (ref 0–15)
GLUCOSE SERPL-MCNC: 80 MG/DL (ref 70–99)
HCT VFR BLD AUTO: 49.6 % (ref 40–53)
HGB BLD-MCNC: 17 G/DL (ref 13.3–17.7)
IMM GRANULOCYTES # BLD: 0 10E3/UL
IMM GRANULOCYTES NFR BLD: 0 %
LYMPHOCYTES # BLD AUTO: 2.7 10E3/UL (ref 0.8–5.3)
LYMPHOCYTES NFR BLD AUTO: 33 %
MCH RBC QN AUTO: 30.6 PG (ref 26.5–33)
MCHC RBC AUTO-ENTMCNC: 34.3 G/DL (ref 31.5–36.5)
MCV RBC AUTO: 89 FL (ref 78–100)
MONOCYTES # BLD AUTO: 0.6 10E3/UL (ref 0–1.3)
MONOCYTES NFR BLD AUTO: 7 %
NEUTROPHILS # BLD AUTO: 4.8 10E3/UL (ref 1.6–8.3)
NEUTROPHILS NFR BLD AUTO: 58 %
NRBC # BLD AUTO: 0 10E3/UL
NRBC BLD AUTO-RTO: 0 /100
PLATELET # BLD AUTO: 289 10E3/UL (ref 150–450)
POTASSIUM SERPL-SCNC: 3.8 MMOL/L (ref 3.4–5.3)
PROT SERPL-MCNC: 7.4 G/DL (ref 6.4–8.3)
RBC # BLD AUTO: 5.55 10E6/UL (ref 4.4–5.9)
SODIUM SERPL-SCNC: 140 MMOL/L (ref 135–145)
WBC # BLD AUTO: 8.2 10E3/UL (ref 4–11)

## 2023-12-18 PROCEDURE — 86140 C-REACTIVE PROTEIN: CPT | Performed by: INTERNAL MEDICINE

## 2023-12-18 PROCEDURE — 85652 RBC SED RATE AUTOMATED: CPT | Performed by: INTERNAL MEDICINE

## 2023-12-18 PROCEDURE — 82248 BILIRUBIN DIRECT: CPT | Performed by: INTERNAL MEDICINE

## 2023-12-18 PROCEDURE — 85014 HEMATOCRIT: CPT | Performed by: INTERNAL MEDICINE

## 2023-12-18 NOTE — PROGRESS NOTES
..Skilled Nurse visit in the Patient Home to administer Remicade.  No recent elevated temperature, fever, chills, productive cough, coughing for 3 weeks or longer or hemoptysis, abnormal vital signs, night sweats, chest pain. No  decrease in your appetite, unexplained weight loss or fatigue.  No other new onset medical symptoms.  Current weight 180lbs.  Peripheral IVleft AC, 1attempt Pre medicated with Claritin 10mg p,o,. Labs drawn CBCdif with plt, CRP, ESR, BMP, Hepatic panel. Infusion completed without complication or reaction. Pt reports therapy iseffective in managing symptoms related to therapy.

## 2023-12-20 ENCOUNTER — MYC MEDICAL ADVICE (OUTPATIENT)
Dept: GASTROENTEROLOGY | Facility: CLINIC | Age: 23
End: 2023-12-20
Payer: COMMERCIAL

## 2023-12-20 DIAGNOSIS — K51.00 ULCERATIVE PANCOLITIS WITHOUT COMPLICATION (H): Primary | ICD-10-CM

## 2023-12-20 RX ORDER — MEPERIDINE HYDROCHLORIDE 25 MG/ML
25 INJECTION INTRAMUSCULAR; INTRAVENOUS; SUBCUTANEOUS EVERY 30 MIN PRN
OUTPATIENT
Start: 2023-12-20

## 2023-12-20 RX ORDER — ALBUTEROL SULFATE 90 UG/1
1-2 AEROSOL, METERED RESPIRATORY (INHALATION)
Start: 2023-12-20

## 2023-12-20 RX ORDER — ACETAMINOPHEN 325 MG/1
650 TABLET ORAL ONCE
Start: 2023-12-20 | End: 2023-12-20

## 2023-12-20 RX ORDER — ALBUTEROL SULFATE 0.83 MG/ML
2.5 SOLUTION RESPIRATORY (INHALATION)
OUTPATIENT
Start: 2023-12-20

## 2023-12-20 RX ORDER — DIPHENHYDRAMINE HCL 25 MG
25 CAPSULE ORAL ONCE
Start: 2023-12-20 | End: 2023-12-20

## 2023-12-20 RX ORDER — DIPHENHYDRAMINE HYDROCHLORIDE 50 MG/ML
50 INJECTION INTRAMUSCULAR; INTRAVENOUS
Start: 2023-12-20

## 2023-12-20 RX ORDER — EPINEPHRINE 1 MG/ML
0.3 INJECTION, SOLUTION, CONCENTRATE INTRAVENOUS EVERY 5 MIN PRN
OUTPATIENT
Start: 2023-12-20

## 2023-12-20 RX ORDER — METHYLPREDNISOLONE SODIUM SUCCINATE 125 MG/2ML
32 INJECTION, POWDER, LYOPHILIZED, FOR SOLUTION INTRAMUSCULAR; INTRAVENOUS ONCE
OUTPATIENT
Start: 2023-12-20 | End: 2023-12-20

## 2023-12-20 RX ORDER — METHYLPREDNISOLONE SODIUM SUCCINATE 125 MG/2ML
125 INJECTION, POWDER, LYOPHILIZED, FOR SOLUTION INTRAMUSCULAR; INTRAVENOUS
Start: 2023-12-20

## 2023-12-20 NOTE — TELEPHONE ENCOUNTER
Therapy plan orders ; plan has been updated. Patient remains on the same medication regimen of Remicade 10mg/kg Q4w. Standing lab orders placed, including BMP. Hepatitis B serologies from  do not indicate immunity; [facility] has recommended vaccination. Quant Gold TB test due; last drawn 2022. Last office visit was 10/24/23 with Andrew Winter. Next office visit is scheduled for 10/29/24 with same provider. Patient receives infusions at \[facility]\"".

## 2024-01-17 ENCOUNTER — DOCUMENTATION ONLY (OUTPATIENT)
Dept: PHARMACY | Facility: CLINIC | Age: 24
End: 2024-01-17
Payer: COMMERCIAL

## 2024-01-17 NOTE — PROGRESS NOTES
..Skilled Nurse visit in the Patient Home to administer Remicade.  No recent elevated temperature, fever, chills,  coughing for 3 weeks or longer or hemoptysis, night sweats, chest pain. No  decrease in your appetite, unexplained weight loss or fatigue. Pt in the early stages of a cold since yesterday.  Nasal congestion (mostly left nare), HA,  improved sore throat, cough productive of small amounts of medium thickness secretions that he swallows.  Afebrile, HR, RR, BP slightly higher for his baseline.  Tested negative for Covid 19 yesterday and this morning.  Wanted to infuse today, ok to infuse per Yulissa \[facility] [place] [facility]\"" pharmacist.  Remainder of assessment is negative.  GI/ WNL.  Tolerated infusion well today.  No further concerns.  No other new onset medical symptoms.  Current weight 178lbs.  Peripheral IVleft AC, 1attempt Pre medicated with Claritin 10mg p,o,. Labs drawn N/A. Infusion completed without complication or reaction. Pt reports therapy iseffective in managing symptoms related to therapy.

## 2024-01-27 ENCOUNTER — LAB (OUTPATIENT)
Dept: LAB | Facility: CLINIC | Age: 24
End: 2024-01-27
Payer: COMMERCIAL

## 2024-01-27 DIAGNOSIS — K51.00 ULCERATIVE PANCOLITIS WITHOUT COMPLICATION (H): ICD-10-CM

## 2024-01-27 PROCEDURE — 86481 TB AG RESPONSE T-CELL SUSP: CPT | Performed by: INTERNAL MEDICINE

## 2024-01-27 PROCEDURE — 99000 SPECIMEN HANDLING OFFICE-LAB: CPT | Performed by: PATHOLOGY

## 2024-01-27 PROCEDURE — 36415 COLL VENOUS BLD VENIPUNCTURE: CPT | Performed by: PATHOLOGY

## 2024-01-28 LAB
GAMMA INTERFERON BACKGROUND BLD IA-ACNC: 0.04 IU/ML
M TB IFN-G BLD-IMP: NEGATIVE
M TB IFN-G CD4+ BCKGRND COR BLD-ACNC: 9.96 IU/ML
MITOGEN IGNF BCKGRD COR BLD-ACNC: 0.03 IU/ML
MITOGEN IGNF BCKGRD COR BLD-ACNC: 0.04 IU/ML
QUANTIFERON MITOGEN: 10 IU/ML
QUANTIFERON NIL TUBE: 0.04 IU/ML
QUANTIFERON TB1 TUBE: 0.07 IU/ML
QUANTIFERON TB2 TUBE: 0.08

## 2024-02-12 ENCOUNTER — DOCUMENTATION ONLY (OUTPATIENT)
Dept: PHARMACY | Facility: CLINIC | Age: 24
End: 2024-02-12

## 2024-02-12 ENCOUNTER — LAB REQUISITION (OUTPATIENT)
Dept: LAB | Facility: CLINIC | Age: 24
End: 2024-02-12
Payer: COMMERCIAL

## 2024-02-12 DIAGNOSIS — K51.90 ULCERATIVE COLITIS, UNSPECIFIED, WITHOUT COMPLICATIONS (H): ICD-10-CM

## 2024-02-12 LAB
ALBUMIN SERPL BCG-MCNC: 4.6 G/DL (ref 3.5–5.2)
ALP SERPL-CCNC: 67 U/L (ref 40–150)
ALT SERPL W P-5'-P-CCNC: 19 U/L (ref 0–70)
ANION GAP SERPL CALCULATED.3IONS-SCNC: 11 MMOL/L (ref 7–15)
AST SERPL W P-5'-P-CCNC: 19 U/L (ref 0–45)
BASOPHILS # BLD AUTO: 0.1 10E3/UL (ref 0–0.2)
BASOPHILS NFR BLD AUTO: 1 %
BILIRUB DIRECT SERPL-MCNC: <0.2 MG/DL (ref 0–0.3)
BILIRUB SERPL-MCNC: 0.7 MG/DL
BUN SERPL-MCNC: 10.6 MG/DL (ref 6–20)
CALCIUM SERPL-MCNC: 9.8 MG/DL (ref 8.6–10)
CHLORIDE SERPL-SCNC: 101 MMOL/L (ref 98–107)
CREAT SERPL-MCNC: 1.03 MG/DL (ref 0.67–1.17)
CRP SERPL-MCNC: <3 MG/L
DEPRECATED HCO3 PLAS-SCNC: 27 MMOL/L (ref 22–29)
EGFRCR SERPLBLD CKD-EPI 2021: >90 ML/MIN/1.73M2
EOSINOPHIL # BLD AUTO: 0.1 10E3/UL (ref 0–0.7)
EOSINOPHIL NFR BLD AUTO: 1 %
ERYTHROCYTE [DISTWIDTH] IN BLOOD BY AUTOMATED COUNT: 13.2 % (ref 10–15)
ERYTHROCYTE [SEDIMENTATION RATE] IN BLOOD BY WESTERGREN METHOD: 2 MM/HR (ref 0–15)
GLUCOSE SERPL-MCNC: 82 MG/DL (ref 70–99)
HCT VFR BLD AUTO: 51.8 % (ref 40–53)
HGB BLD-MCNC: 17.5 G/DL (ref 13.3–17.7)
IMM GRANULOCYTES # BLD: 0 10E3/UL
IMM GRANULOCYTES NFR BLD: 0 %
LYMPHOCYTES # BLD AUTO: 2.4 10E3/UL (ref 0.8–5.3)
LYMPHOCYTES NFR BLD AUTO: 27 %
MCH RBC QN AUTO: 30.3 PG (ref 26.5–33)
MCHC RBC AUTO-ENTMCNC: 33.8 G/DL (ref 31.5–36.5)
MCV RBC AUTO: 90 FL (ref 78–100)
MONOCYTES # BLD AUTO: 0.8 10E3/UL (ref 0–1.3)
MONOCYTES NFR BLD AUTO: 9 %
NEUTROPHILS # BLD AUTO: 5.5 10E3/UL (ref 1.6–8.3)
NEUTROPHILS NFR BLD AUTO: 62 %
NRBC # BLD AUTO: 0 10E3/UL
NRBC BLD AUTO-RTO: 0 /100
PLATELET # BLD AUTO: 288 10E3/UL (ref 150–450)
POTASSIUM SERPL-SCNC: 3.8 MMOL/L (ref 3.4–5.3)
PROT SERPL-MCNC: 7.9 G/DL (ref 6.4–8.3)
RBC # BLD AUTO: 5.77 10E6/UL (ref 4.4–5.9)
SODIUM SERPL-SCNC: 139 MMOL/L (ref 135–145)
WBC # BLD AUTO: 8.8 10E3/UL (ref 4–11)

## 2024-02-12 PROCEDURE — 85004 AUTOMATED DIFF WBC COUNT: CPT | Performed by: INTERNAL MEDICINE

## 2024-02-12 PROCEDURE — 80053 COMPREHEN METABOLIC PANEL: CPT | Performed by: INTERNAL MEDICINE

## 2024-02-12 PROCEDURE — 85652 RBC SED RATE AUTOMATED: CPT | Performed by: INTERNAL MEDICINE

## 2024-02-12 PROCEDURE — 86140 C-REACTIVE PROTEIN: CPT | Performed by: INTERNAL MEDICINE

## 2024-02-12 NOTE — PROGRESS NOTES
Skilled Nurse visit in the Patient Home to administer REMICADE 800 mg IV Every 4 weeks. Patient recently had covid but is now negative and afebrile and some residual sniffles and fatigue. No recent elevated temperature, fever, chills, productive cough, coughing for 3 weeks or longer or hemoptysis, abnormal vital signs, night sweats, chest pain. No  decrease in your appetite, unexplained weight loss or fatigue.  No other new onset medical symptoms.  Current weight 174.6 lbs.  Peripheral IVleft AC, 1attempt Pre medicated with  Loratadine 10 mg by mouth. Patient will use home supply. Labs drawn CBCd/p, BMP, CRP, ESR+, hepatic panel. Infusion completed without complication or reaction. Pt reports therapy is effective in managing symptoms related to therapy. Nataliya Scott RN BSN

## 2024-03-11 ENCOUNTER — DOCUMENTATION ONLY (OUTPATIENT)
Dept: PHARMACY | Facility: CLINIC | Age: 24
End: 2024-03-11
Payer: COMMERCIAL

## 2024-03-11 NOTE — PROGRESS NOTES
Skilled Nurse visit in the Patient Home to administer Remicade 800mg IV infusion.  No recent elevated temperature, fever, chills, productive cough, coughing for 3 weeks or longer or hemoptysis, abnormal vital signs, night sweats, chest pain. No  decrease in your appetite, unexplained weight loss or fatigue.  No other new onset medical symptoms.  Current weight 173lbs.  Peripheral IVleft AC, 1attempt Pre medicated with Claritin 10mg PO, pt declined tylenol. Infusion completed without complication or reaction. Pt reports therapy iseffective in managing symptoms related to therapy.    Gretel Macedo, RN, BSN   [facility]  [email or web address]

## 2024-04-08 ENCOUNTER — DOCUMENTATION ONLY (OUTPATIENT)
Dept: PHARMACY | Facility: CLINIC | Age: 24
End: 2024-04-08

## 2024-04-08 ENCOUNTER — LAB REQUISITION (OUTPATIENT)
Dept: LAB | Facility: CLINIC | Age: 24
End: 2024-04-08
Payer: COMMERCIAL

## 2024-04-08 DIAGNOSIS — K51.90 ULCERATIVE COLITIS, UNSPECIFIED, WITHOUT COMPLICATIONS (H): ICD-10-CM

## 2024-04-08 LAB
ALBUMIN SERPL BCG-MCNC: 4.6 G/DL (ref 3.5–5.2)
ALP SERPL-CCNC: 68 U/L (ref 40–150)
ALT SERPL W P-5'-P-CCNC: 23 U/L (ref 0–70)
ANION GAP SERPL CALCULATED.3IONS-SCNC: 12 MMOL/L (ref 7–15)
AST SERPL W P-5'-P-CCNC: 22 U/L (ref 0–45)
BASOPHILS # BLD AUTO: 0.1 10E3/UL (ref 0–0.2)
BASOPHILS NFR BLD AUTO: 1 %
BILIRUB DIRECT SERPL-MCNC: <0.2 MG/DL (ref 0–0.3)
BILIRUB SERPL-MCNC: 0.7 MG/DL
BUN SERPL-MCNC: 9.7 MG/DL (ref 6–20)
CALCIUM SERPL-MCNC: 9.3 MG/DL (ref 8.6–10)
CHLORIDE SERPL-SCNC: 101 MMOL/L (ref 98–107)
CREAT SERPL-MCNC: 1.07 MG/DL (ref 0.67–1.17)
CRP SERPL-MCNC: <3 MG/L
DEPRECATED HCO3 PLAS-SCNC: 28 MMOL/L (ref 22–29)
EGFRCR SERPLBLD CKD-EPI 2021: >90 ML/MIN/1.73M2
EOSINOPHIL # BLD AUTO: 0.1 10E3/UL (ref 0–0.7)
EOSINOPHIL NFR BLD AUTO: 2 %
ERYTHROCYTE [DISTWIDTH] IN BLOOD BY AUTOMATED COUNT: 13.1 % (ref 10–15)
ERYTHROCYTE [SEDIMENTATION RATE] IN BLOOD BY WESTERGREN METHOD: 3 MM/HR (ref 0–15)
GLUCOSE SERPL-MCNC: 107 MG/DL (ref 70–99)
HCT VFR BLD AUTO: 52.9 % (ref 40–53)
HGB BLD-MCNC: 18 G/DL (ref 13.3–17.7)
IMM GRANULOCYTES # BLD: 0 10E3/UL
IMM GRANULOCYTES NFR BLD: 0 %
LYMPHOCYTES # BLD AUTO: 2.6 10E3/UL (ref 0.8–5.3)
LYMPHOCYTES NFR BLD AUTO: 29 %
MCH RBC QN AUTO: 30.4 PG (ref 26.5–33)
MCHC RBC AUTO-ENTMCNC: 34 G/DL (ref 31.5–36.5)
MCV RBC AUTO: 89 FL (ref 78–100)
MONOCYTES # BLD AUTO: 0.6 10E3/UL (ref 0–1.3)
MONOCYTES NFR BLD AUTO: 6 %
NEUTROPHILS # BLD AUTO: 5.6 10E3/UL (ref 1.6–8.3)
NEUTROPHILS NFR BLD AUTO: 62 %
NRBC # BLD AUTO: 0 10E3/UL
NRBC BLD AUTO-RTO: 0 /100
PLATELET # BLD AUTO: 289 10E3/UL (ref 150–450)
POTASSIUM SERPL-SCNC: 4 MMOL/L (ref 3.4–5.3)
PROT SERPL-MCNC: 7.5 G/DL (ref 6.4–8.3)
RBC # BLD AUTO: 5.93 10E6/UL (ref 4.4–5.9)
SODIUM SERPL-SCNC: 141 MMOL/L (ref 135–145)
WBC # BLD AUTO: 8.6 10E3/UL (ref 4–11)

## 2024-04-08 PROCEDURE — 80053 COMPREHEN METABOLIC PANEL: CPT | Performed by: INTERNAL MEDICINE

## 2024-04-08 PROCEDURE — 85652 RBC SED RATE AUTOMATED: CPT | Performed by: INTERNAL MEDICINE

## 2024-04-08 PROCEDURE — 82374 ASSAY BLOOD CARBON DIOXIDE: CPT | Performed by: INTERNAL MEDICINE

## 2024-04-08 PROCEDURE — 86140 C-REACTIVE PROTEIN: CPT | Performed by: INTERNAL MEDICINE

## 2024-04-08 PROCEDURE — 85025 COMPLETE CBC W/AUTO DIFF WBC: CPT | Performed by: INTERNAL MEDICINE

## 2024-04-08 NOTE — PROGRESS NOTES
Skilled Nurse visit in the Patient Home to administer Remicade 800 mg in 250 mL of sodium chloride 0.9% IV over approximately 1 hour every 4 weeks. No recent elevated temperature, fever, chills, productive cough, coughing for 3 weeks or longer or hemoptysis, abnormal vital signs, night sweats, chest pain. No  decrease in your appetite, unexplained weight loss or fatigue.  No other new onset medical symptoms.  Current weight 172.6 lbs.  Peripheral IVleft AC, 1attempt Pre medicated with Acetaminophen 1000 mg by mouth, Loratadine , (Claritin) 10 mg by mouth,. Labs drawn CBC d/p, BMP, CRP, ESR, Hepatic Panel. Infusion completed without complication or reaction. Pt reports therapy iseffective in managing symptoms related to therapy.  Nataliya Scott RN BSN

## 2024-05-06 ENCOUNTER — DOCUMENTATION ONLY (OUTPATIENT)
Dept: PHARMACY | Facility: CLINIC | Age: 24
End: 2024-05-06
Payer: COMMERCIAL

## 2024-05-06 NOTE — PROGRESS NOTES
..Skilled Nurse visit in the Patient Home to administer Remicade.  No recent elevated temperature, fever, chills,  coughing for 3 weeks or longer or hemoptysis, night sweats, chest pain. No  decrease in your appetite, unexplained weight loss or fatigue. Assessment is negative.  GI/ WNL.  Tolerated infusion well today.  No further concerns.  No new onset medical symptoms.  Current weight 171lbs.  Peripheral IVleft AC, 1attempt Pre medicated with Claritin 10mg p,o,. Labs drawn N/A. Infusion completed without complication or reaction. Pt reports therapy is effective in managing symptoms related to therapy.

## 2024-06-06 ENCOUNTER — LAB REQUISITION (OUTPATIENT)
Dept: LAB | Facility: CLINIC | Age: 24
End: 2024-06-06
Payer: COMMERCIAL

## 2024-06-06 ENCOUNTER — DOCUMENTATION ONLY (OUTPATIENT)
Dept: PHARMACY | Facility: CLINIC | Age: 24
End: 2024-06-06

## 2024-06-06 DIAGNOSIS — K51.90 ULCERATIVE COLITIS, UNSPECIFIED, WITHOUT COMPLICATIONS (H): ICD-10-CM

## 2024-06-06 LAB
ALBUMIN SERPL BCG-MCNC: 4.5 G/DL (ref 3.5–5.2)
ALP SERPL-CCNC: 82 U/L (ref 40–150)
ALT SERPL W P-5'-P-CCNC: 16 U/L (ref 0–70)
ANION GAP SERPL CALCULATED.3IONS-SCNC: 14 MMOL/L (ref 7–15)
AST SERPL W P-5'-P-CCNC: 15 U/L (ref 0–45)
BASOPHILS # BLD AUTO: 0.1 10E3/UL (ref 0–0.2)
BASOPHILS NFR BLD AUTO: 1 %
BILIRUB DIRECT SERPL-MCNC: <0.2 MG/DL (ref 0–0.3)
BILIRUB SERPL-MCNC: 0.5 MG/DL
BUN SERPL-MCNC: 8.7 MG/DL (ref 6–20)
CALCIUM SERPL-MCNC: 9.1 MG/DL (ref 8.6–10)
CHLORIDE SERPL-SCNC: 103 MMOL/L (ref 98–107)
CREAT SERPL-MCNC: 0.98 MG/DL (ref 0.67–1.17)
CRP SERPL-MCNC: <3 MG/L
DEPRECATED HCO3 PLAS-SCNC: 23 MMOL/L (ref 22–29)
EGFRCR SERPLBLD CKD-EPI 2021: >90 ML/MIN/1.73M2
EOSINOPHIL # BLD AUTO: 0.1 10E3/UL (ref 0–0.7)
EOSINOPHIL NFR BLD AUTO: 2 %
ERYTHROCYTE [DISTWIDTH] IN BLOOD BY AUTOMATED COUNT: 12.8 % (ref 10–15)
ERYTHROCYTE [SEDIMENTATION RATE] IN BLOOD BY WESTERGREN METHOD: 6 MM/HR (ref 0–15)
GLUCOSE SERPL-MCNC: 84 MG/DL (ref 70–99)
HCT VFR BLD AUTO: 50.7 % (ref 40–53)
HGB BLD-MCNC: 17.2 G/DL (ref 13.3–17.7)
IMM GRANULOCYTES # BLD: 0 10E3/UL
IMM GRANULOCYTES NFR BLD: 1 %
LYMPHOCYTES # BLD AUTO: 3.1 10E3/UL (ref 0.8–5.3)
LYMPHOCYTES NFR BLD AUTO: 35 %
MCH RBC QN AUTO: 30.5 PG (ref 26.5–33)
MCHC RBC AUTO-ENTMCNC: 33.9 G/DL (ref 31.5–36.5)
MCV RBC AUTO: 90 FL (ref 78–100)
MONOCYTES # BLD AUTO: 0.7 10E3/UL (ref 0–1.3)
MONOCYTES NFR BLD AUTO: 8 %
NEUTROPHILS # BLD AUTO: 4.7 10E3/UL (ref 1.6–8.3)
NEUTROPHILS NFR BLD AUTO: 53 %
NRBC # BLD AUTO: 0 10E3/UL
NRBC BLD AUTO-RTO: 0 /100
PLATELET # BLD AUTO: 286 10E3/UL (ref 150–450)
POTASSIUM SERPL-SCNC: 4.2 MMOL/L (ref 3.4–5.3)
PROT SERPL-MCNC: 7.5 G/DL (ref 6.4–8.3)
RBC # BLD AUTO: 5.64 10E6/UL (ref 4.4–5.9)
SODIUM SERPL-SCNC: 140 MMOL/L (ref 135–145)
WBC # BLD AUTO: 8.8 10E3/UL (ref 4–11)

## 2024-06-06 PROCEDURE — 80076 HEPATIC FUNCTION PANEL: CPT | Performed by: INTERNAL MEDICINE

## 2024-06-06 PROCEDURE — 85041 AUTOMATED RBC COUNT: CPT | Performed by: INTERNAL MEDICINE

## 2024-06-06 PROCEDURE — 85652 RBC SED RATE AUTOMATED: CPT | Performed by: INTERNAL MEDICINE

## 2024-06-06 PROCEDURE — 86140 C-REACTIVE PROTEIN: CPT | Performed by: INTERNAL MEDICINE

## 2024-06-06 NOTE — PROGRESS NOTES
..Skilled Nurse visit in the Patient Home to administer Remicade.  No recent elevated temperature, fever, chills,  coughing for 3 weeks or longer or hemoptysis, night sweats, chest pain. No  decrease in your appetite, unexplained weight loss or fatigue. Assessment is negative.  GI/ WNL.  Tolerated infusion well today.  No further concerns.  No new onset medical symptoms.  Current weight 173lbs.  Peripheral IV right forearm 1attempt Pre medicated with Claritin 10mg p,o,. Labs drawn CBCdp, BMP, CRP, ESR, Hepatic panel. Infusion completed without complication or reaction. Pt reports therapy is effective in managing symptoms related to therapy.

## 2024-07-08 ENCOUNTER — DOCUMENTATION ONLY (OUTPATIENT)
Dept: PHARMACY | Facility: CLINIC | Age: 24
End: 2024-07-08
Payer: COMMERCIAL

## 2024-07-08 NOTE — PROGRESS NOTES
..Skilled Nurse visit in the Patient Home to administer Remicade.  No recent elevated temperature, fever, chills,  coughing for 3 weeks or longer or hemoptysis, night sweats, chest pain. No  decrease in your appetite, unexplained weight loss or fatigue. Assessment is negative.  GI/ WNL.  Tolerated infusion well today.  No further concerns.  No new onset medical symptoms.  Current weight 170lbs.  Peripheral IVleft forearm, 1attempt Pre medicated with Loratidine 10mg p,o,. Labs drawn N/A. Infusion completed without complication or reaction. Pt reports therapy is effective in managing symptoms related to therapy.

## 2024-07-14 ENCOUNTER — HEALTH MAINTENANCE LETTER (OUTPATIENT)
Age: 24
End: 2024-07-14

## 2024-08-05 ENCOUNTER — LAB REQUISITION (OUTPATIENT)
Dept: LAB | Facility: CLINIC | Age: 24
End: 2024-08-05
Payer: COMMERCIAL

## 2024-08-05 ENCOUNTER — DOCUMENTATION ONLY (OUTPATIENT)
Dept: PHARMACY | Facility: CLINIC | Age: 24
End: 2024-08-05
Payer: COMMERCIAL

## 2024-08-05 DIAGNOSIS — K51.90 ULCERATIVE COLITIS, UNSPECIFIED, WITHOUT COMPLICATIONS (H): ICD-10-CM

## 2024-08-05 LAB
ALBUMIN SERPL BCG-MCNC: 4.5 G/DL (ref 3.5–5.2)
ALP SERPL-CCNC: 65 U/L (ref 40–150)
ALT SERPL W P-5'-P-CCNC: 19 U/L (ref 0–70)
ANION GAP SERPL CALCULATED.3IONS-SCNC: 14 MMOL/L (ref 7–15)
AST SERPL W P-5'-P-CCNC: 24 U/L (ref 0–45)
BASOPHILS # BLD AUTO: 0 10E3/UL (ref 0–0.2)
BASOPHILS NFR BLD AUTO: 0 %
BILIRUB DIRECT SERPL-MCNC: <0.2 MG/DL (ref 0–0.3)
BILIRUB SERPL-MCNC: 0.5 MG/DL
BUN SERPL-MCNC: 7.2 MG/DL (ref 6–20)
CALCIUM SERPL-MCNC: 9.5 MG/DL (ref 8.8–10.4)
CHLORIDE SERPL-SCNC: 99 MMOL/L (ref 98–107)
CREAT SERPL-MCNC: 0.96 MG/DL (ref 0.67–1.17)
CRP SERPL-MCNC: <3 MG/L
EGFRCR SERPLBLD CKD-EPI 2021: >90 ML/MIN/1.73M2
EOSINOPHIL # BLD AUTO: 0.1 10E3/UL (ref 0–0.7)
EOSINOPHIL NFR BLD AUTO: 2 %
ERYTHROCYTE [DISTWIDTH] IN BLOOD BY AUTOMATED COUNT: 13.1 % (ref 10–15)
ERYTHROCYTE [SEDIMENTATION RATE] IN BLOOD BY WESTERGREN METHOD: 2 MM/HR (ref 0–15)
GLUCOSE SERPL-MCNC: 107 MG/DL (ref 70–99)
HCO3 SERPL-SCNC: 24 MMOL/L (ref 22–29)
HCT VFR BLD AUTO: 49.2 % (ref 40–53)
HGB BLD-MCNC: 17.2 G/DL (ref 13.3–17.7)
IMM GRANULOCYTES # BLD: 0 10E3/UL
IMM GRANULOCYTES NFR BLD: 0 %
LYMPHOCYTES # BLD AUTO: 3 10E3/UL (ref 0.8–5.3)
LYMPHOCYTES NFR BLD AUTO: 35 %
MCH RBC QN AUTO: 31.6 PG (ref 26.5–33)
MCHC RBC AUTO-ENTMCNC: 35 G/DL (ref 31.5–36.5)
MCV RBC AUTO: 90 FL (ref 78–100)
MONOCYTES # BLD AUTO: 0.6 10E3/UL (ref 0–1.3)
MONOCYTES NFR BLD AUTO: 7 %
NEUTROPHILS # BLD AUTO: 4.8 10E3/UL (ref 1.6–8.3)
NEUTROPHILS NFR BLD AUTO: 56 %
NRBC # BLD AUTO: 0 10E3/UL
NRBC BLD AUTO-RTO: 0 /100
PLATELET # BLD AUTO: 265 10E3/UL (ref 150–450)
POTASSIUM SERPL-SCNC: 3.9 MMOL/L (ref 3.4–5.3)
PROT SERPL-MCNC: 7.5 G/DL (ref 6.4–8.3)
RBC # BLD AUTO: 5.45 10E6/UL (ref 4.4–5.9)
SODIUM SERPL-SCNC: 137 MMOL/L (ref 135–145)
WBC # BLD AUTO: 8.6 10E3/UL (ref 4–11)

## 2024-08-05 PROCEDURE — 80053 COMPREHEN METABOLIC PANEL: CPT | Performed by: INTERNAL MEDICINE

## 2024-08-05 PROCEDURE — 82248 BILIRUBIN DIRECT: CPT | Performed by: INTERNAL MEDICINE

## 2024-08-05 PROCEDURE — 86140 C-REACTIVE PROTEIN: CPT | Performed by: INTERNAL MEDICINE

## 2024-08-05 PROCEDURE — 85025 COMPLETE CBC W/AUTO DIFF WBC: CPT | Performed by: INTERNAL MEDICINE

## 2024-08-05 PROCEDURE — 85652 RBC SED RATE AUTOMATED: CPT | Performed by: INTERNAL MEDICINE

## 2024-08-05 NOTE — PROGRESS NOTES
..Skilled Nurse visit in the Patient Home to administer Remicade.  No recent elevated temperature, fever, chills,  coughing for 3 weeks or longer or hemoptysis, night sweats, chest pain. No  decrease in your appetite, unexplained weight loss or fatigue. Assessment is negative.  GI/ WNL.  Tolerated infusion well today.  No further concerns.  No new onset medical symptoms.  Current weight 170lbs.  Peripheral IV right forearm 1attempt Pre medicated with Claritin 10mg p,o,. Labs drawn CBCdp, BMP, CRP, ESR, Hepatic panel. Infusion completed without complication or reaction. Pt reports therapy is effective in managing symptoms related to therapy.

## 2024-09-03 ENCOUNTER — DOCUMENTATION ONLY (OUTPATIENT)
Dept: PHARMACY | Facility: CLINIC | Age: 24
End: 2024-09-03
Payer: COMMERCIAL

## 2024-09-04 NOTE — PROGRESS NOTES
..Skilled Nurse visit in the Patient Home to administer Remicade.  No recent elevated temperature, fever, chills,  coughing for 3 weeks or longer or hemoptysis, night sweats, chest pain. No  decrease in your appetite, unexplained weight loss or fatigue. Assessment is negative.  GI/ WNL.  Tolerated infusion well today.  No further concerns.  No new onset medical symptoms.  Current weight 174lbs.  Peripheral IVright forearm, 1attempt Pre medicated with Loratidine 10mg p,o,. Labs drawn N/A. Infusion completed without complication or reaction. Pt reports therapy is effective in managing symptoms related to therapy.

## 2024-09-05 ENCOUNTER — ENROLLMENT (OUTPATIENT)
Dept: HOME HEALTH SERVICES | Facility: HOME HEALTH | Age: 24
End: 2024-09-05
Payer: COMMERCIAL

## 2024-09-10 ENCOUNTER — TELEPHONE (OUTPATIENT)
Dept: GASTROENTEROLOGY | Facility: CLINIC | Age: 24
End: 2024-09-10
Payer: COMMERCIAL

## 2024-09-10 NOTE — TELEPHONE ENCOUNTER
Returned call to [facility] patient support. On hold for 13 minutes. Spoke with pharmacist. She reported [facility] in taking over the Remicade prescription from [employer]. Patient receives Remicade 10mg/kg Q4w with [facility]. [facility] fills this medication. PAP was utilized 07/2023 when patient had a lapse in insurance coverage, but he now fills with [facility] through commercial insurance.    Notified [facility] co-pay assistance team. They will call [facility] to confirm co-pay assistance program has not been interrupted.    Callback number: [phone]

## 2024-10-03 ENCOUNTER — DOCUMENTATION ONLY (OUTPATIENT)
Dept: PHARMACY | Facility: CLINIC | Age: 24
End: 2024-10-03
Payer: COMMERCIAL

## 2024-10-03 ENCOUNTER — LAB REQUISITION (OUTPATIENT)
Dept: LAB | Facility: CLINIC | Age: 24
End: 2024-10-03
Payer: COMMERCIAL

## 2024-10-03 DIAGNOSIS — K51.90 ULCERATIVE COLITIS, UNSPECIFIED, WITHOUT COMPLICATIONS (H): ICD-10-CM

## 2024-10-03 LAB
ALBUMIN SERPL BCG-MCNC: 4.6 G/DL (ref 3.5–5.2)
ALP SERPL-CCNC: 78 U/L (ref 40–150)
ALT SERPL W P-5'-P-CCNC: 23 U/L (ref 0–70)
ANION GAP SERPL CALCULATED.3IONS-SCNC: 10 MMOL/L (ref 7–15)
AST SERPL W P-5'-P-CCNC: 18 U/L (ref 0–45)
BASOPHILS # BLD AUTO: 0.1 10E3/UL (ref 0–0.2)
BASOPHILS NFR BLD AUTO: 1 %
BILIRUB DIRECT SERPL-MCNC: <0.2 MG/DL (ref 0–0.3)
BILIRUB SERPL-MCNC: 0.5 MG/DL
BUN SERPL-MCNC: 8.9 MG/DL (ref 6–20)
CALCIUM SERPL-MCNC: 9.7 MG/DL (ref 8.8–10.4)
CHLORIDE SERPL-SCNC: 102 MMOL/L (ref 98–107)
CREAT SERPL-MCNC: 0.98 MG/DL (ref 0.67–1.17)
CRP SERPL-MCNC: <3 MG/L
EGFRCR SERPLBLD CKD-EPI 2021: >90 ML/MIN/1.73M2
EOSINOPHIL # BLD AUTO: 0.1 10E3/UL (ref 0–0.7)
EOSINOPHIL NFR BLD AUTO: 2 %
ERYTHROCYTE [DISTWIDTH] IN BLOOD BY AUTOMATED COUNT: 12.7 % (ref 10–15)
ERYTHROCYTE [SEDIMENTATION RATE] IN BLOOD BY WESTERGREN METHOD: 6 MM/HR (ref 0–15)
GLUCOSE SERPL-MCNC: 59 MG/DL (ref 70–99)
HCO3 SERPL-SCNC: 30 MMOL/L (ref 22–29)
HCT VFR BLD AUTO: 51.4 % (ref 40–53)
HGB BLD-MCNC: 17.6 G/DL (ref 13.3–17.7)
IMM GRANULOCYTES # BLD: 0 10E3/UL
IMM GRANULOCYTES NFR BLD: 0 %
LYMPHOCYTES # BLD AUTO: 3.1 10E3/UL (ref 0.8–5.3)
LYMPHOCYTES NFR BLD AUTO: 42 %
MCH RBC QN AUTO: 30.6 PG (ref 26.5–33)
MCHC RBC AUTO-ENTMCNC: 34.2 G/DL (ref 31.5–36.5)
MCV RBC AUTO: 89 FL (ref 78–100)
MONOCYTES # BLD AUTO: 0.6 10E3/UL (ref 0–1.3)
MONOCYTES NFR BLD AUTO: 8 %
NEUTROPHILS # BLD AUTO: 3.5 10E3/UL (ref 1.6–8.3)
NEUTROPHILS NFR BLD AUTO: 47 %
NRBC # BLD AUTO: 0 10E3/UL
NRBC BLD AUTO-RTO: 0 /100
PLATELET # BLD AUTO: 283 10E3/UL (ref 150–450)
POTASSIUM SERPL-SCNC: 4.2 MMOL/L (ref 3.4–5.3)
PROT SERPL-MCNC: 7.9 G/DL (ref 6.4–8.3)
RBC # BLD AUTO: 5.76 10E6/UL (ref 4.4–5.9)
SODIUM SERPL-SCNC: 142 MMOL/L (ref 135–145)
WBC # BLD AUTO: 7.4 10E3/UL (ref 4–11)

## 2024-10-03 PROCEDURE — 82248 BILIRUBIN DIRECT: CPT | Performed by: INTERNAL MEDICINE

## 2024-10-03 PROCEDURE — 80048 BASIC METABOLIC PNL TOTAL CA: CPT | Performed by: INTERNAL MEDICINE

## 2024-10-03 PROCEDURE — 86140 C-REACTIVE PROTEIN: CPT | Performed by: INTERNAL MEDICINE

## 2024-10-03 PROCEDURE — 85004 AUTOMATED DIFF WBC COUNT: CPT | Performed by: INTERNAL MEDICINE

## 2024-10-03 PROCEDURE — 85652 RBC SED RATE AUTOMATED: CPT | Performed by: INTERNAL MEDICINE

## 2024-10-03 NOTE — PROGRESS NOTES
..Skilled Nurse visit in the Patient Home to administer Remicade.  No recent elevated temperature, fever, chills,  coughing for 3 weeks or longer or hemoptysis, night sweats, chest pain. No  decrease in your appetite, unexplained weight loss or fatigue. Assessment is negative.  GI/ WNL.  Tolerated infusion well today.  No further concerns.  No new onset medical symptoms.  Current weight 175lbs.  Peripheral IV left AC 1attempt Pre medicated with Claritin 10mg p,o,. Labs drawn CBCdp, BMP, CRP, ESR, Hepatic panel. Infusion completed without complication or reaction. Pt reports therapy is effective in managing symptoms related to therapy.

## 2024-10-08 ENCOUNTER — HOME INFUSION (OUTPATIENT)
Dept: HOME HEALTH SERVICES | Facility: HOME HEALTH | Age: 24
End: 2024-10-08
Payer: COMMERCIAL

## 2024-10-08 VITALS — WEIGHT: 175 LBS | BODY MASS INDEX: 28.46 KG/M2

## 2024-10-08 DIAGNOSIS — K51.00 ULCERATIVE PANCOLITIS WITHOUT COMPLICATION (H): Primary | ICD-10-CM

## 2024-10-08 RX ORDER — WATER 10 ML/10ML
80 INJECTION INTRAMUSCULAR; INTRAVENOUS; SUBCUTANEOUS
Qty: 180 ML | Refills: 0 | Status: DISPENSED | OUTPATIENT
Start: 2024-10-23 | End: 2024-12-19

## 2024-10-08 RX ORDER — SODIUM CHLORIDE 9 MG/ML
500 INJECTION, SOLUTION INTRAVENOUS PRN
Qty: 999999 ML | Refills: 0 | Status: ACTIVE | OUTPATIENT
Start: 2024-10-23 | End: 2024-12-19

## 2024-10-08 RX ORDER — EPINEPHRINE 0.3 MG/.3ML
0.3 INJECTION SUBCUTANEOUS PRN
Qty: 999999 ML | Refills: 0 | Status: ACTIVE | OUTPATIENT
Start: 2024-10-23 | End: 2024-12-19

## 2024-10-08 RX ORDER — DIPHENHYDRAMINE HYDROCHLORIDE 50 MG/ML
50 INJECTION INTRAMUSCULAR; INTRAVENOUS PRN
Qty: 99999 ML | Refills: 0 | Status: ACTIVE | OUTPATIENT
Start: 2024-10-23 | End: 2024-12-19

## 2024-10-08 RX ORDER — INFLIXIMAB 100 MG/10ML
800 INJECTION, POWDER, LYOPHILIZED, FOR SOLUTION INTRAVENOUS
Qty: 18 EACH | Refills: 0 | Status: DISPENSED | OUTPATIENT
Start: 2024-10-23 | End: 2024-12-19

## 2024-10-27 ENCOUNTER — HOME INFUSION (OUTPATIENT)
Dept: HOME HEALTH SERVICES | Facility: HOME HEALTH | Age: 24
End: 2024-10-27
Payer: COMMERCIAL

## 2024-10-29 ENCOUNTER — VIRTUAL VISIT (OUTPATIENT)
Dept: GASTROENTEROLOGY | Facility: CLINIC | Age: 24
End: 2024-10-29
Attending: PHYSICIAN ASSISTANT
Payer: COMMERCIAL

## 2024-10-29 DIAGNOSIS — K51.00 ULCERATIVE PANCOLITIS WITHOUT COMPLICATION (H): Primary | ICD-10-CM

## 2024-10-29 PROCEDURE — 99214 OFFICE O/P EST MOD 30 MIN: CPT | Mod: 95 | Performed by: PHYSICIAN ASSISTANT

## 2024-10-29 NOTE — NURSING NOTE
Current patient location: Patient declined to provide     Is the patient currently in the state of [place]? YES    Visit mode:VIDEO    If the visit is dropped, the patient can be reconnected by: VIDEO VISIT: Send to e-mail at: [email or web address]    Will anyone else be joining the visit? NO  (If patient encounters technical issues they should call [phone]    Are changes needed to the allergy or medication list? No    Are refills needed on medications prescribed by this physician? NO    Rooming Documentation:  Patient will complete questionnaire(s) in [facility]    Reason for visit: RECHECK    Marietta TIJERINAF

## 2024-10-29 NOTE — PATIENT INSTRUCTIONS
It was a pleasure taking care of you today.  I've included a brief summary of our discussion and care plan from today's visit below.  Please review this information with your primary care provider.  ______________________________________________________________________    My recommendations are summarized as follows:    -- Continue remicade every 4 weeks  -- Labs every 3 months   -- Next endoscopic assessment: 2025, orders in  -- Patient with IBD we recommend supplementation vitamin D 1000 units daily and calcium 500 mg twice daily.  -- Vaccines/immunizations to be updated: shingles vaccine   -- Yearly Dermatology visit for skin check while on immunosuppressive therapy. Can call [phone] to schedule.  -- No NSAIDs (ibuprofen, or anything containing ibuprofen)       For additional resources about inflammatory bowel disease visit http://www.crohnscolitisfoundation.org/     To learn more about Diet and Nutrition in the setting of IBD, check out some of these resources:  https://www.crohnscolitisfoundation.org/diet-and-nutrition/what-should-i-eat  https://ntforibd.org/ (SCD, mSCD, Autoimmune protocol, IBD-AID, CDED diets with inclusion/exclusion charts)  https://CloudSwitch.org/ (mediterranean diet)  [email or web address]/ibd/ibdaid/ (IBD-AID)     Return to GI Clinic in 6 months to review your progress.    ______________________________________________________________________    How do I schedule labs, imaging studies, or procedures that were ordered in clinic today?     Labs: To schedule lab appointment at the [facility], use my chart or call [phone]. If you have a [facility] lab closer to home where you are regularly seen you can give them a call.     Procedures: If a colonoscopy, upper endoscopy, breath test, esophageal manometry, or pH impedence was ordered today, our endoscopy team will call you to schedule this. If you have not heard from our endoscopy team within a week, please  call [phone] to schedule.     Imaging Studies: If you were scheduled for a CT scan, X-ray, MRI, ultrasound, HIDA scan or other imaging study, please call [phone] to have this scheduled.     Referral: If a referral to another specialty was ordered, expect a phone call or follow instructions above. If you have not heard from anyone regarding your referral in a week, please call our clinic to check the status.     Who do I call with any questions after my visit?  Please be in touch if there are any further questions that arise following today's visit.  There are multiple ways to contact your gastroenterology care team.      During business hours, you may reach a Gastroenterology nurse at [phone]    To schedule or reschedule an appointment, please call [phone].     You can always send a secure message through [employer].  [employer] messages are answered by your nurse or doctor typically within 24 hours.  Please allow extra time on weekends and holidays.      For urgent/emergent questions after business hours, you may reach the on-call GI Fellow by contacting the [facility]  at [phone].     How will I get the results of any tests ordered?    You will receive all of your results.  If you have signed up for [employer], any tests ordered at your visit will be available to you after your physician reviews them.  Typically this takes 1-2 weeks.  If there are urgent results that require a change in your care plan, your physician or nurse will call you to discuss the next steps.      What is [employer] for [employer]?  [employer] for [employer] is a secure way for you to access all of your healthcare records from the [facility].  It is a web based computer program, so you can sign on to it from any location.  It also allows you to send secure messages to your care team.  I recommend signing up for [employer] for [employer] access if you have not already done so and are comfortable with using a computer.          Sincerely,    Andrwe Winter PA-C  [facility]  Division of Gastroenterology

## 2024-10-29 NOTE — LETTER
10/29/2024      Jae Sparks  [address]  [facility] [address]      Dear Colleague,    Thank you for referring your patient, Jae Sparks, to the [facility] [place]. Please see a copy of my visit note below.    Virtual Visit Details    Type of service:  Video Visit     Originating Location (pt. Location): Home    Distant Location (provider location):  Off-site  Platform used for Video Visit: [facility] UC follow up       PATIENT: Jae Sparks    MRN: [id number]    Date of Birth 2000    Tel: [phone] (home)     PCP: No primary care provider on file.     HPI: Mr. Sparks is a 23 year old year old male here for follow up of ulcerative pancolitis.     UC history    Jae was diagnosed with pan-UC in late 2017. He was started on prednisone and Humira 8/2017. He felt somewhat better on Humira but lost response about a week with continued symptoms. Trough level in February 2018 -undetectable Humira level without antibodies and dose changed to weekly 2/2018.     Could not continue with self injections of Humira, so switched to Inflectra May 2020 (Dr. Landeros,  in [place]). Dose increased to 7.5 mg/kg q8 w when level was low (4.58, 0 ATIs) and FC was elevated at 441.     Last inflectra dose was 8 weeks ago.      Macroscopic extent of disease (most recent) E3    Current UC symptoms    Bowel frequency in day 3 (in the morning)   Bowel frequency in night 0  Urgency of defecation occasional  Blood in stool none  General well being 0 = very well  Extracolonic features (multiple select) none     Constitutional symptoms:  Fever NO  Weight loss NO    Noteworthy diet history- no dairy, low fiber     Other GI symptoms present none    Total number of IBD surgeries (except perianal): 0    Current IBD Medications:  Inflectra    Past IBD Medications:   Prednisone  Humira    Interval history, 12/2020  Doing well. Next inflectra on 1/18/2021. No  breakthrough symptoms.     Current UC symptoms    Bowel frequency in day 2-3   Bowel frequency in night 0  Urgency of defecation occasional  Blood in stool none  General well being 0 = very well  Extracolonic features (multiple select) none     Interval history, 11/2021  A couple days prior to inflectra may feel some urgency.     Current UC symptoms  Bowel frequency in day 1-3   Bowel frequency in night 0  Urgency of defecation occasional  Blood in stool none  General well being 0 = very well  Extracolonic features (multiple select) none    Interval history, 10/2022  IFX level was only 0.7 in 12/2021 and his IFX was increased to 10 mg/kg q4w.     Current UC symptoms  Bowel frequency in day 1-2  Bowel frequency in night 0  Urgency of defecation no  Blood in stool none  General well being 0 = very well  Extracolonic features (multiple select) none    Interval history, 4/12/23  1-2 stools per day. Formed, no blood. No urgency.      Current UC symptoms  Bowel frequency in day 1-2  Bowel frequency in night 0  Urgency of defecation no  Blood in stool none  General well being 0 = very well  Extracolonic features (multiple select) none    Interval history, 10/24/23  1-2 (occasionally 3) stools per day. Formed, no blood. No urgency.  No skin or joint problems. Twitch in right eye.  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     Current UC symptoms  Bowel frequency in day 1-2  Bowel frequency in night 0  Urgency of defecation no  Blood in stool none  General well being 0 = very well  Extracolonic features (multiple select) none    Interval hx 10/29/24  1-2 (occasionally 3) stools per day. Formed, no blood. Some morning urgency. No EIM.     Current UC symptoms  Bowel frequency in day 1-2  Bowel frequency in night 0  Urgency of defecation no  Blood in stool none  General well being 0 = very well  Extracolonic features (multiple select) none    Past Medical History:   Diagnosis Date     Uncomplicated asthma      "    Past Surgical History:   Procedure Laterality Date     NO HISTORY OF SURGERY         Social History     Tobacco Use     Smoking status: Never     Smokeless tobacco: Never   Substance Use Topics     Alcohol use: Not Currently       Family History   Problem Relation Age of Onset     Inflammatory Bowel Disease No family hx of      Autoimmune Disease No family hx of      Colon Cancer No family hx of        Allergies   Allergen Reactions     Pollen Extract Itching     Cantaloupe (Diagnostic) Hives and Itching     Cats         Outpatient Encounter Medications as of 10/29/2024   Medication Sig Dispense Refill     albuterol (PROAIR HFA/PROVENTIL HFA/VENTOLIN HFA) 108 (90 Base) MCG/ACT inhaler Inhale 1-2 puffs into the lungs every 4 hours as needed 18 g 1     diphenhydrAMINE (BENADRYL) 50 MG/ML injection Inject 1 mL (50 mg) over 3-5 minutes into the vein via push as needed for other (infusion reaction). For RN use only.  Draw up in a syringe and administer IV push.  Discard remainder of vial. 48249 mL 0     Emergency Supply Kit, PIV, Patient use for emergency only. Contents: 3 sodium chloride 0.9% flushes, 1 IV start kit, 1 microclave ext set 14\", 1 each IV Cath 22 G/1\" and 24G/3/4\", 6 alcohol prep pads, 4 nitrile gloves (med). Call [phone] to reorder. [id number] kit 0     EPINEPHrine (ANY BX GENERIC EQUIV) 0.3 MG/0.3ML injection 2-pack Inject 0.3 mLs (0.3 mg) into the muscle as needed for anaphylaxis (infusion reaction). Administer into the mid-thigh in case of severe anaphylaxis (wheezing, throat tightening, mouth swelling, difficulty breathing). May repeat dose one time in 5-15 minutes if symptoms persist. [id number] mL 0     fexofenadine (ALLEGRA) 180 MG tablet Take 180 mg by mouth daily as needed for allergies (in spring and summer)       fluticasone furoate 27.5 MCG/SPRAY nasal spray Spray 2 sprays into both nostrils daily as needed for rhinitis or allergies (seasonally)       inFLIXimab (REMICADE) 100 MG injection " Add to infusion 80 mLs (800 mg) every 4 weeks. Reconstitute infliximab vial(s).  Draw up infliximab 10 mg/mL in syringe with 21 G needle and add to NaCl 0.9% bag immediately prior to infusing.  MIX GENTLY BY INVERSION, DO NOT SHAKE. Discard remainder of vial(s). 18 each 0     methylPREDNISolone Na Suc, PF, (SOLU-MEDROL) 125 mg/2 mL injection Inject 2 mLs (125 mg) over 3-5 minutes into the vein via push as needed (infusion reaction). For RN use only.  Reconstitute vial. Draw up methylPREDNISolone in a syringe and administer.  Discard remainder of vial. [id number] mL 0     omeprazole (PRILOSEC) 40 MG DR capsule Take 40 mg by mouth daily as needed        sodium chloride 0.9% bag Infuse 250 mLs over 1 hours into the vein every 4 weeks. Add 80 mL (800 mg) of infliximab 10 mg/mL to bag immediately prior to infusing via gravity infusion. When complete, flush bag with 20 mL NS to flush tubing. [id number] mL 0     sodium chloride 0.9% infusion Infuse 500 mLs into the vein as needed for other (infusion reaction). In case of mild reaction, administer via gravity at 20 mL/hr to keep vein open. In case of severe reaction, administer via gravity wide open on prime setting. [id number] mL 0     sodium chloride, PF, 0.9% PF flush Inject 10 mLs into the vein as needed for other (infusion reaction). For RN use only as needed for infusion reaction [id number] mL 0     sodium chloride, PF, 0.9% PF flush Inject 10 mLs into the vein as needed for line flush. Flush IV before and after medication administration as directed and/or at least every 12 hours. [id number] mL 0     sterile water, preservative free, injection Use 80 mLs for reconstitution every 4 weeks. 1. Reconstitute each vial of infliximab with 10 mL of sterile water for injection by slowly injecting 10 mL sterile water down the inside wall of vial w/21 G needle. DO NOT SHAKE. Foaming of the solution on reconstitution is not unusual. Roll and tilt each vial gently.  2. Let drug stand for 5 minutes.  3. Inspect vials for particules and/or discoloration prior to continuing. The solution should be colorless to light yellow and opalescent, and may develop a few translucent particles. DO NOT USE IF DRUG HAS NOT FULLY DISSOLVED OR IF OPAQUE PARTICLES, DISCOLORATION OR OTHER FOREIGN PARTICULES ARE PRESENT. 4. Draw up appropriate dose w/ 21 G needle from vial. 180 mL 0     No facility-administered encounter medications on file as of 10/29/2024.     NSAID  NO    Review of Systems  Complete 10 System ROS performed. All are negative except as documented below, in the HPI, or in patient questionnaire from today's visit.    1) Constitutional: No fevers, chills, night sweats or malaise, weight loss or gain  2) Skin: No rash  3) Pulmonary: No wheeze, SOB, cough, sputum or hemoptysis  4) Cardiovascular: No Chest pain or palpitations  5) Genitourinary: No blood in urine or dysuria  6) Endocrine: No increased sweating, hunger, thirst or thyroid problems  7) Hematologic: No bruising and easy bleeding  8) Musculoskeletal: no new pain in joints or limitation in ROM  9) Neurologic: No dizziness, paresthesias or weakness or falls  10) Psychiatric:  not depressed/anxious, no sleep problems    PHYSICAL EXAM  Vitals: There were no vitals taken for this visit.    No Pain (0)     General appearance  Healthy appearing adult, in no acute distress     Eyes  Sclera anicteric  Pupils round and reactive to light     Ears, nose, mouth and throat  No obvious external lesions of ears and nose  Hearing intact     Neck  Symmetric  No obvious external lesions     Respiratory  Normal respiration, no use of accessory muscles      MSK  Gait normal     Skin  No rashes or jaundice     Psychiatric  Oriented to person, place and time  Appropriate mood and affect.   DATA:  Reviewed in detail past documentation, medications and prior workup available in electronic health records or through outside records.    PERTINENT STUDIES:  Tuberculosis: QFN neg  12/2019  HBV serology neg in 1/2018    DRUG MONITORING  Biologic concentration: IFX trough 4.58 (ATIs were not tested, given presence of drug level). 12/201: IFX trough was only 0.7, 0 ATIs. Increased to 10 mg/kg every 4 weeks.     5/16/2016 Flex sig: Signs of colonic inflammation identified in the left colon predominantly, likely beyond  Bx - mild active colitis, favored to be a resolving or self limited infectious colitis    7/10/2017 Flex sig: Moderate colitis from rectum to 40cm consistent with ulcerative colitis.  Bx - chronic colitis, moderately active.  Treated with lialda, an ti-TNF agent discussed but not started.    8/2/2017 CT Abdomen/Pelvis: ? Acute pyelonephritis, diffuse colon wall thickening.    8/4/2017 Sigmoidoscopy: Punched out deep ulceration/edema/erythema up to 70 cm. Biopsies take from mid-descending, distal descending, proximal sigmoid, distal sigmoid, and rectum.    6/7/2018 Colonoscopy for IBD disease assessment:  Scarring seen throughout the colon c/w healed ulcerative colitis. Scattered pseudopolyps seen throughout the colon, most marked in the ascending colon and cecum. Mild pinpoint erosions were seen in the terminal ileum. Random biopsies were taken throughout.   Endoscopic examination was performed to the Terminal ileum. This was for assessment of ulcerative colitis disease activity.  The Lee score for each colonic segment is below:  Rectum: 0 (normal) - normal path  Sigmoid colon: 0 (normal) - normal path  Descending colon: 0 (normal) - normal path  Transverse colon: 0 (normal) - normal path  Ascending colon: 0 (normal) - focal active colitis  Cecum: 0 (normal) - normal path  Terminal ileum: Mild pinpoint erosions. - focal active ileitis - ?medication or prep effect  Based on your overall colonoscopy findings, you have colitis that is Completely healed (Lee 0), though there is possible mild terminal ileitis.     IMPRESSION:    DIAGNOSIS:  # E3 UC in clinical remission on inflectra  10 mg/kg q4w in deep remission  # IBD Health Care Maintenance  Mr. Sparks is a 24 year old here with E3 UC in deep remission (icscope in 2018 showed Lee 0) on Inflectra 10 mg/kg q4w. Normal fecal yun 10/2022. We will plan for the following:    PLAN:  ---Continue IFX 10 mg/kg every 4 weeks  ---Dermatology for FBSE   ---Colon cancer screening to start in 2025, order placed with Dr. Eng under MAC sedation.    Misc:  -- Avoid tobacco use  -- Avoid NSAIDs as there is potentially a 25% chance of causing an IBD flare  -- Vit D Supplementation      Andrew Winter PA-C  Division of Gastroenterology, Hepatology and Nutrition  [facility]                         Again, thank you for allowing me to participate in the care of your patient.        Sincerely,        Andrew Winter PA-C

## 2024-10-29 NOTE — PROGRESS NOTES
Virtual Visit Details    Type of service:  Video Visit     Originating Location (pt. Location): Home    Distant Location (provider location):  Off-site  Platform used for Video Visit: AmWell    Start 920  Stop 935      [facility] UC follow up       PATIENT: Jae Sparks    MRN: [id number]    Date of Birth 2000    Tel: [phone] (home)     PCP: No primary care provider on file.     HPI: Mr. Sparks is a 23 year old year old male here for follow up of ulcerative pancolitis.     UC history    Jae was diagnosed with pan-UC in late 2017. He was started on prednisone and Humira 8/2017. He felt somewhat better on Humira but lost response about a week with continued symptoms. Trough level in February 2018 -undetectable Humira level without antibodies and dose changed to weekly 2/2018.     Could not continue with self injections of Humira, so switched to Inflectra May 2020 (Dr. Landeros,  in [place]). Dose increased to 7.5 mg/kg q8 w when level was low (4.58, 0 ATIs) and FC was elevated at 441.     Last inflectra dose was 8 weeks ago.      Macroscopic extent of disease (most recent) E3    Current UC symptoms    Bowel frequency in day 3 (in the morning)   Bowel frequency in night 0  Urgency of defecation occasional  Blood in stool none  General well being 0 = very well  Extracolonic features (multiple select) none     Constitutional symptoms:  Fever NO  Weight loss NO    Noteworthy diet history- no dairy, low fiber     Other GI symptoms present none    Total number of IBD surgeries (except perianal): 0    Current IBD Medications:  Inflectra    Past IBD Medications:   Prednisone  Humira    Interval history, 12/2020  Doing well. Next inflectra on 1/18/2021. No breakthrough symptoms.     Current UC symptoms    Bowel frequency in day 2-3   Bowel frequency in night 0  Urgency of defecation occasional  Blood in stool none  General well being 0 = very well  Extracolonic features (multiple select) none      Interval history, 11/2021  A couple days prior to inflectra may feel some urgency.     Current UC symptoms  Bowel frequency in day 1-3   Bowel frequency in night 0  Urgency of defecation occasional  Blood in stool none  General well being 0 = very well  Extracolonic features (multiple select) none    Interval history, 10/2022  IFX level was only 0.7 in 12/2021 and his IFX was increased to 10 mg/kg q4w.     Current UC symptoms  Bowel frequency in day 1-2  Bowel frequency in night 0  Urgency of defecation no  Blood in stool none  General well being 0 = very well  Extracolonic features (multiple select) none    Interval history, 4/12/23  1-2 stools per day. Formed, no blood. No urgency.      Current UC symptoms  Bowel frequency in day 1-2  Bowel frequency in night 0  Urgency of defecation no  Blood in stool none  General well being 0 = very well  Extracolonic features (multiple select) none    Interval history, 10/24/23  1-2 (occasionally 3) stools per day. Formed, no blood. No urgency.  No skin or joint problems. Twitch in right eye.  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     Current UC symptoms  Bowel frequency in day 1-2  Bowel frequency in night 0  Urgency of defecation no  Blood in stool none  General well being 0 = very well  Extracolonic features (multiple select) none    Interval hx 10/29/24  1-2 (occasionally 3) stools per day. Formed, no blood. Some morning urgency. No EIM.     Current UC symptoms  Bowel frequency in day 1-2  Bowel frequency in night 0  Urgency of defecation no  Blood in stool none  General well being 0 = very well  Extracolonic features (multiple select) none    Past Medical History:   Diagnosis Date    Uncomplicated asthma         Past Surgical History:   Procedure Laterality Date    NO HISTORY OF SURGERY         Social History     Tobacco Use    Smoking status: Never    Smokeless tobacco: Never   Substance Use Topics    Alcohol use: Not Currently       Family History  "  Problem Relation Age of Onset    Inflammatory Bowel Disease No family hx of     Autoimmune Disease No family hx of     Colon Cancer No family hx of        Allergies   Allergen Reactions    Pollen Extract Itching    Cantaloupe (Diagnostic) Hives and Itching    Cats         Outpatient Encounter Medications as of 10/29/2024   Medication Sig Dispense Refill    albuterol (PROAIR HFA/PROVENTIL HFA/VENTOLIN HFA) 108 (90 Base) MCG/ACT inhaler Inhale 1-2 puffs into the lungs every 4 hours as needed 18 g 1    diphenhydrAMINE (BENADRYL) 50 MG/ML injection Inject 1 mL (50 mg) over 3-5 minutes into the vein via push as needed for other (infusion reaction). For RN use only.  Draw up in a syringe and administer IV push.  Discard remainder of vial. 59783 mL 0    Emergency Supply Kit, PIV, Patient use for emergency only. Contents: 3 sodium chloride 0.9% flushes, 1 IV start kit, 1 microclave ext set 14\", 1 each IV Cath 22 G/1\" and 24G/3/4\", 6 alcohol prep pads, 4 nitrile gloves (med). Call [phone] to reorder. 047622 kit 0    EPINEPHrine (ANY BX GENERIC EQUIV) 0.3 MG/0.3ML injection 2-pack Inject 0.3 mLs (0.3 mg) into the muscle as needed for anaphylaxis (infusion reaction). Administer into the mid-thigh in case of severe anaphylaxis (wheezing, throat tightening, mouth swelling, difficulty breathing). May repeat dose one time in 5-15 minutes if symptoms persist. 135126 mL 0    fexofenadine (ALLEGRA) 180 MG tablet Take 180 mg by mouth daily as needed for allergies (in spring and summer)      fluticasone furoate 27.5 MCG/SPRAY nasal spray Spray 2 sprays into both nostrils daily as needed for rhinitis or allergies (seasonally)      inFLIXimab (REMICADE) 100 MG injection Add to infusion 80 mLs (800 mg) every 4 weeks. Reconstitute infliximab vial(s).  Draw up infliximab 10 mg/mL in syringe with 21 G needle and add to NaCl 0.9% bag immediately prior to infusing.  MIX GENTLY BY INVERSION, DO NOT SHAKE. Discard remainder of vial(s). " 18 each 0    methylPREDNISolone Na Suc, PF, (SOLU-MEDROL) 125 mg/2 mL injection Inject 2 mLs (125 mg) over 3-5 minutes into the vein via push as needed (infusion reaction). For RN use only.  Reconstitute vial. Draw up methylPREDNISolone in a syringe and administer.  Discard remainder of vial. 399204 mL 0    omeprazole (PRILOSEC) 40 MG DR capsule Take 40 mg by mouth daily as needed       sodium chloride 0.9% bag Infuse 250 mLs over 1 hours into the vein every 4 weeks. Add 80 mL (800 mg) of infliximab 10 mg/mL to bag immediately prior to infusing via gravity infusion. When complete, flush bag with 20 mL NS to flush tubing. 964902 mL 0    sodium chloride 0.9% infusion Infuse 500 mLs into the vein as needed for other (infusion reaction). In case of mild reaction, administer via gravity at 20 mL/hr to keep vein open. In case of severe reaction, administer via gravity wide open on prime setting. 526189 mL 0    sodium chloride, PF, 0.9% PF flush Inject 10 mLs into the vein as needed for other (infusion reaction). For RN use only as needed for infusion reaction [id number] mL 0    sodium chloride, PF, 0.9% PF flush Inject 10 mLs into the vein as needed for line flush. Flush IV before and after medication administration as directed and/or at least every 12 hours. 417107 mL 0    sterile water, preservative free, injection Use 80 mLs for reconstitution every 4 weeks. 1. Reconstitute each vial of infliximab with 10 mL of sterile water for injection by slowly injecting 10 mL sterile water down the inside wall of vial w/21 G needle. DO NOT SHAKE. Foaming of the solution on reconstitution is not unusual. Roll and tilt each vial gently.  2. Let drug stand for 5 minutes. 3. Inspect vials for particules and/or discoloration prior to continuing. The solution should be colorless to light yellow and opalescent, and may develop a few translucent particles. DO NOT USE IF DRUG HAS NOT FULLY DISSOLVED OR IF OPAQUE PARTICLES, DISCOLORATION OR  OTHER FOREIGN PARTICULES ARE PRESENT. 4. Draw up appropriate dose w/ 21 G needle from vial. 180 mL 0     No facility-administered encounter medications on file as of 10/29/2024.     NSAID  NO    Review of Systems  Complete 10 System ROS performed. All are negative except as documented below, in the HPI, or in patient questionnaire from today's visit.    1) Constitutional: No fevers, chills, night sweats or malaise, weight loss or gain  2) Skin: No rash  3) Pulmonary: No wheeze, SOB, cough, sputum or hemoptysis  4) Cardiovascular: No Chest pain or palpitations  5) Genitourinary: No blood in urine or dysuria  6) Endocrine: No increased sweating, hunger, thirst or thyroid problems  7) Hematologic: No bruising and easy bleeding  8) Musculoskeletal: no new pain in joints or limitation in ROM  9) Neurologic: No dizziness, paresthesias or weakness or falls  10) Psychiatric:  not depressed/anxious, no sleep problems    PHYSICAL EXAM  Vitals: There were no vitals taken for this visit.    No Pain (0)     General appearance  Healthy appearing adult, in no acute distress     Eyes  Sclera anicteric  Pupils round and reactive to light     Ears, nose, mouth and throat  No obvious external lesions of ears and nose  Hearing intact     Neck  Symmetric  No obvious external lesions     Respiratory  Normal respiration, no use of accessory muscles      MSK  Gait normal     Skin  No rashes or jaundice     Psychiatric  Oriented to person, place and time  Appropriate mood and affect.   DATA:  Reviewed in detail past documentation, medications and prior workup available in electronic health records or through outside records.    PERTINENT STUDIES:  Tuberculosis: QFN neg 12/2019  HBV serology neg in 1/2018    DRUG MONITORING  Biologic concentration: IFX trough 4.58 (ATIs were not tested, given presence of drug level). 12/201: IFX trough was only 0.7, 0 ATIs. Increased to 10 mg/kg every 4 weeks.     5/16/2016 Flex sig: Signs of colonic  inflammation identified in the left colon predominantly, likely beyond  Bx - mild active colitis, favored to be a resolving or self limited infectious colitis    7/10/2017 Flex sig: Moderate colitis from rectum to 40cm consistent with ulcerative colitis.  Bx - chronic colitis, moderately active.  Treated with lialda, an ti-TNF agent discussed but not started.    8/2/2017 CT Abdomen/Pelvis: ? Acute pyelonephritis, diffuse colon wall thickening.    8/4/2017 Sigmoidoscopy: Punched out deep ulceration/edema/erythema up to 70 cm. Biopsies take from mid-descending, distal descending, proximal sigmoid, distal sigmoid, and rectum.    6/7/2018 Colonoscopy for IBD disease assessment:  Scarring seen throughout the colon c/w healed ulcerative colitis. Scattered pseudopolyps seen throughout the colon, most marked in the ascending colon and cecum. Mild pinpoint erosions were seen in the terminal ileum. Random biopsies were taken throughout.   Endoscopic examination was performed to the Terminal ileum. This was for assessment of ulcerative colitis disease activity.  The Lee score for each colonic segment is below:  Rectum: 0 (normal) - normal path  Sigmoid colon: 0 (normal) - normal path  Descending colon: 0 (normal) - normal path  Transverse colon: 0 (normal) - normal path  Ascending colon: 0 (normal) - focal active colitis  Cecum: 0 (normal) - normal path  Terminal ileum: Mild pinpoint erosions. - focal active ileitis - ?medication or prep effect  Based on your overall colonoscopy findings, you have colitis that is Completely healed (Lee 0), though there is possible mild terminal ileitis.     IMPRESSION:    DIAGNOSIS:  # E3 UC in clinical remission on inflectra 10 mg/kg q4w in deep remission  # IBD Health Care Maintenance  Mr. Sparks is a 24 year old here with E3 UC in deep remission (icscope in 2018 showed Lee 0) on Inflectra 10 mg/kg q4w. Normal fecal yun 10/2022. We will plan for the following:    PLAN:  ---Continue IFX  10 mg/kg every 4 weeks  ---Dermatology for FBSE   ---Colon cancer screening to start in 2025, order placed with Dr. Eng under MAC sedation.    Misc:  -- Avoid tobacco use  -- Avoid NSAIDs as there is potentially a 25% chance of causing an IBD flare  -- Vit D Supplementation      Andrew Winter PA-C  Division of Gastroenterology, Hepatology and Nutrition  [facility]

## 2024-10-30 ENCOUNTER — HOME INFUSION BILLING (OUTPATIENT)
Dept: HOME HEALTH SERVICES | Facility: HOME HEALTH | Age: 24
End: 2024-10-30
Payer: COMMERCIAL

## 2024-10-30 PROCEDURE — A4213 20+ CC SYRINGE ONLY: HCPCS

## 2024-10-30 PROCEDURE — S1015 IV TUBING EXTENSION SET: HCPCS

## 2024-10-30 PROCEDURE — A4930 STERILE, GLOVES PER PAIR: HCPCS

## 2024-10-30 PROCEDURE — A4215 STERILE NEEDLE: HCPCS

## 2024-11-04 ENCOUNTER — HOME CARE VISIT (OUTPATIENT)
Dept: HOME HEALTH SERVICES | Facility: HOME HEALTH | Age: 24
End: 2024-11-04
Payer: COMMERCIAL

## 2024-11-04 ENCOUNTER — TELEPHONE (OUTPATIENT)
Dept: GASTROENTEROLOGY | Facility: CLINIC | Age: 24
End: 2024-11-04
Payer: COMMERCIAL

## 2024-11-04 VITALS
SYSTOLIC BLOOD PRESSURE: 122 MMHG | RESPIRATION RATE: 16 BRPM | WEIGHT: 176 LBS | HEART RATE: 54 BPM | OXYGEN SATURATION: 98 % | DIASTOLIC BLOOD PRESSURE: 66 MMHG | TEMPERATURE: 97.5 F | BODY MASS INDEX: 28.63 KG/M2

## 2024-11-04 PROCEDURE — A4217 STERILE WATER/SALINE, 500 ML: HCPCS

## 2024-11-04 PROCEDURE — S9359 HIT ANTI-TNF PER DIEM: HCPCS

## 2024-11-04 NOTE — PROGRESS NOTES
"Infusion Nursing Note:  Skilled nurse visit today for Remicade for Jae Sparks.   present during visit today: Not Applicable.    Pre-infusion Checklist:   Pre-infusion Checklist    Have you had any delayed reaction since last infusion?   No    Have you recently had an elevated temperature, fever, chills productive cough, coughing for 3 weeks or longer or hemoptysis, abnormal vital signs, night sweats, chest pain, or have you noticed a decrease in your appetite, or noted unexplained weight loss or fatigue?   No    Do you have any open wounds or new incisions?  No    Do you have any recent or upcoming hospitalizations, surgeries, or dental procedures?   No    Do you currently have or recently have had any signs of illness or infection or are you on any antibiotics?   No    Have you had any new, sudden , or worsening abdominal pain?   No    Have you or anyone in your household received a live vaccination in the past 4 weeks?   No    Have you recently been diagnosed with any new nervous system diseases or cancer diagnosis? (i.e., Multiple Sclerosis, Guillain [place], sezures, neurological changes) Are you receiving any form of radiation or chemotherapy?   No    Are you pregnant or breastfeeding, or do you have plans of pregnancy in the future?   No    Have you been having any signs of worsening depression or suicidal ideation?  No    Have there been any other new onset medical symptoms?  No    Did the patient answer \"YES\" to any of the questions above?  No    Will the patient receive a medication that has an order for infusion reaction management?  Yes, and all drugs and supplies are available and none have .    If ordered, has the patient taken pre-medications?  Yes    Plan:   Therapy is appropriate, will proceed with treatment.     Chemotherapy Treatment Conditions:   Not Applicable    Intravenous Access:  Peripheral IV placed.    Post Infusion Assessment:  Patient tolerated infusion without " incident.     Note: Remicade (infliximab) 800 mg in 250 mL of NS 0.9% IV over approx 1 hour q4wks.  Given 3632-9611  Loratadine (Claritin) 10 mg by mouth, 30 minutes prior to infusion. Patient will use home supply; 1400  Pt does not take Tylenol as premed any longer  Line flushed with 20mL NS, PIV flushed with additional 10mL NS, discontinued, blood return brisk, catheter intact.  Total flushes - 40mL    Pt alert and oriented, in NAD.  Assessment is negative.  Tolerated infusion well today, no concerns.  Labs due in December.      Next Visit Plan:   December 2nd 2024      Rosana Jamil RN 11/4/2024

## 2024-11-04 NOTE — TELEPHONE ENCOUNTER
Patient confirmed scheduled appointment:  Date: 5/7  Time: 920  Provider: Andrew SHEA  Location: Video   Testing/imaging:   Additional notes:

## 2024-11-08 ENCOUNTER — TELEPHONE (OUTPATIENT)
Dept: GASTROENTEROLOGY | Facility: CLINIC | Age: 24
End: 2024-11-08
Payer: COMMERCIAL

## 2024-11-08 NOTE — TELEPHONE ENCOUNTER
"Endoscopy Scheduling Screen    Have you had any respiratory illness or flu-like symptoms in the last 10 days?  No    What is your communication preference for Instructions and/or Bowel Prep?   MyChart    What insurance is in the chart?  Other:  BCBS    Ordering/Referring Provider: Andrew Winter PA-C [facility]     (If ordering provider performs procedure, schedule with ordering provider unless otherwise instructed. )    BMI: Estimated body mass index is 28.63 kg/m  as calculated from the following:    Height as of 2/17/22: 1.67 m (5' 5.75\").    Weight as of 11/4/24: 79.8 kg (176 lb).     Sedation Ordered  moderate sedation.   If patient BMI > 50 do not schedule in [facility].    If patient BMI > 45 do not schedule at [facility].    Are you taking methadone or Suboxone?  NO, No RN review required.    Have you been diagnosed and are being treated for severe PTSD or severe anxiety?  NO, No RN review required.    Are you taking any prescription medications for pain 3 or more times per week?   NO, No RN review required.    Do you have a history of malignant hyperthermia?  No    (Females) Are you currently pregnant?   No     Have you been diagnosed or told you have pulmonary hypertension?   No    Do you have an LVAD?  No    Have you been told you have moderate to severe sleep apnea?  No.    Have you been told you have COPD, asthma, or any other lung disease?  Yes     What breathing problems do you have?  Asthma     Do you use home oxygen?  No    Have your breathing problems required an ED visit or hospitalization in the last year?  No.    Do you have any heart conditions?  No     Have you ever had or are you waiting for an organ transplant?  No. Continue scheduling, no site restrictions.    Have you had a stroke or transient ischemic attack (TIA aka \"mini stroke\" in the last 6 months?   No    Have you been diagnosed with or been told you have cirrhosis of the liver?   No.    Are you currently on dialysis?   No    Do " "you need assistance transferring?   No    BMI: Estimated body mass index is 28.63 kg/m  as calculated from the following:    Height as of 2/17/22: 1.67 m (5' 5.75\").    Weight as of 11/4/24: 79.8 kg (176 lb).     Is patients BMI > 40 and scheduling location [facility]?  No    Do you take an injectable or oral medication for weight loss or diabetes (excluding insulin)?  No    Do you take the medication Naltrexone?  No    Do you take blood thinners?  No       Prep   Are you currently on dialysis or do you have chronic kidney disease?  No    Do you have a diagnosis of diabetes?  No    Do you have a diagnosis of cystic fibrosis (CF)?  No    On a regular basis do you go 3 -5 days between bowel movements?  No    BMI > 40?  No    Preferred Pharmacy:      [facility] [id number] [place] [address] [address]  Phone: [phone] Fax: [phone]      Final Scheduling Details     Procedure scheduled  Colonoscopy    Surgeon:  SHAYY     Date of procedure:  930AM     Pre-OP / PAC:   No - Not required for this site.    Location  [facility] - Per order.    Sedation   Moderate Sedation - Per order.      Patient Reminders:   You will receive a call from a Nurse to review instructions and health history.  This assessment must be completed prior to your procedure.  Failure to complete the Nurse assessment may result in the procedure being cancelled.      On the day of your procedure, please designate an adult(s) who can drive you home stay with you for the next 24 hours. The medicines used in the exam will make you sleepy. You will not be able to drive.      You cannot take public transportation, ride share services, or non-medical taxi service without a responsible caregiver.  Medical transport services are allowed with the requirement that a responsible caregiver will receive you at your destination.  We require that drivers and caregivers are confirmed prior to your procedure.    "

## 2024-11-11 ENCOUNTER — VIRTUAL VISIT (OUTPATIENT)
Dept: PHARMACY | Facility: CLINIC | Age: 24
End: 2024-11-11
Attending: PHYSICIAN ASSISTANT
Payer: COMMERCIAL

## 2024-11-11 VITALS — HEIGHT: 66 IN | WEIGHT: 176 LBS | BODY MASS INDEX: 28.28 KG/M2

## 2024-11-11 DIAGNOSIS — K51.00 ULCERATIVE PANCOLITIS WITHOUT COMPLICATION (H): Primary | ICD-10-CM

## 2024-11-11 DIAGNOSIS — J30.2 SEASONAL ALLERGIC RHINITIS, UNSPECIFIED TRIGGER: ICD-10-CM

## 2024-11-11 RX ORDER — VITAMIN B COMPLEX
1 TABLET ORAL DAILY
COMMUNITY

## 2024-11-11 ASSESSMENT — PAIN SCALES - GENERAL: PAINLEVEL_OUTOF10: NO PAIN (0)

## 2024-11-11 NOTE — PROGRESS NOTES
Medication Therapy Management (MTM) Encounter    ASSESSMENT:                            Medication Adherence/Access: No issues identified.    Ulcerative Colitis: Jae would benefit from continuing infliximab 10 mg/kg IV every four weeks for maintenance of remission. Reviewed IBD health maintenance as noted below. He would benefit from yearly skin checks given continued anti-TNF immunosuppression. He is still a candidate for the Shingrix series, as well as additional pneumococcal vaccination given continued immunosuppressive therapy. His serologies in 2022 showed his serologies are not in the immune range. He did receive one additional dose after his initial series, so he should get at least two more additional doses. Provider follow-up on file.    Allergies: Stable.    PLAN:                            Continue infliximab 10 mg/kg IV every four weeks.    Jae to consider the following vaccines:  Shingrix series (for Shingles)  Prevnar-20 (for pneumonia)  Repeat the hepatitis B series (at least two more doses)    Reminder to complete yearly skin cancer screening.    Follow-up:    -- 6 months for MTM, sooner if needed    Future Consideration  -- repeat quantiferon    SUBJECTIVE/OBJECTIVE:                          Jae Sparks is a 24 year old male seen for a follow-up visit.     Ioana Fuentes PharmD is present for the visit today as well with permission from Jae Sparks.      Reason for visit: IBD health maintenance on infliximab    Allergies/ADRs: Reviewed in chart  Tobacco: He reports that he has never smoked. He has never used smokeless tobacco.    Medication Adherence/Access: no issues reported.    Ulcerative Colitis:   Infliximab 10 mg/kg every 28 days  Omeprazole 40 mg as needed   Vitamin D 25 mcg daily (will start soon)    Denies any concerns with infliximab. Continues to feel well. Noted to be in dep remission since 2018.     Last provider visit: 10/29/2024 with Andrew Winter PA-C  Next provider visit:  "5/7/2025 with Andrew Winter PA-C  Last Quantiferon: 1/2024    Lab Results   Component Value Date    CALPRF 8.0 11/08/2022    CALPRF 29.2 10/08/2020     IBD Health Maintenance    Vaccinations:  All patients on immunosuppression should avoid live vaccines unless specifically indicated.    -- Influenza (last dose) 11/2/2024  -- Tetanus booster (last dose) 5/12/2016  -- Pneumococcal Pneumonia    PCV-13: 1/10/2018   PPSV-23: 3/26/2018   PCV-20/PCV-21: not on file  -- COVID-19    Initial series: 2021   Last Dose: 11/2/2024    One time confirmation of immunity or serologies:  -- Hepatitis A (serologies or immunizations) 5/4/2000 and 10/14/2002  -- Hepatitis B (serologies or immunizations) 1998, 1998, 2,4,1999 and 5/20/2016 -- serologies 2022 do not indicate immunity  -- Varicella/Zoster    Varicella 5/20/1999, 10/26/2010, and 5/20/2016   Zoster not on file  -- MMR third dose 5/20/2016  -- HPV (all aged 18-26)  1/21/2019, 8/4/2014, 8/19/2013  -- Meningococcal meningitis (all patients at risk for meningitis)-- 5/12/2016 and 8/4/2014    Due to the immunosuppression in this patient, I would not advise administration of live vaccines such as varicella/VZV, intranasal influenza, MMR, or yellow fever vaccine (if traveling).      Immunosuppressive Screening:  -- Hep C Antibody not detected 1/2022    Lab Results   Component Value Date    AUSAB 5.86 01/14/2022    HEPBANG Nonreactive 01/14/2022    HBCAB Nonreactive 01/14/2022    TBRES Negative 01/27/2024     Bone mineral density screening   -- Recommend all patients supplement with calcium and vitamin D    Cancer Screening:  Colon cancer screening:  Per provider notes: \"Colon cancer screening to start in 2025, order placed with Dr. Eng under MAC sedation\"    Skin cancer screening: Annual visual exam of skin by dermatologist since patient is immunocompromised    Depression Screening:    PHQ-2 Score:         10/29/2024     9:05 AM 10/24/2023     2:36 PM   PHQ-2 ( 1999 " "[employer])   Q1: Little interest or pleasure in doing things 0  0   Q2: Feeling down, depressed or hopeless 0  0   PHQ-2 Score 0  0   Q1: Little interest or pleasure in doing things Not at all    Q2: Feeling down, depressed or hopeless Not at all    PHQ-2 Score 0        Patient-reported     Misc:  -- Avoid tobacco use  -- Avoid NSAIDs as there is potentially a 25% chance of causing an IBD flare    Allergies/Asthma:  Fexofenadine 180 mg daily as needed   Fluticasone nasal spray as needed   Albuterol HFA as needed     Using for seasonal allergies in spring/summer. No reported concerns.    Today's Vitals: Ht 5' 6\" (1.676 m)   Wt 176 lb (79.8 kg)   BMI 28.41 kg/m    ----------------    I spent 13 minutes with this patient today. All changes were made via collaborative practice agreement with Dr. Eng // Andrew Winter. A copy of the visit note was provided to the patient's provider(s).    A summary of these recommendations was sent via [employer].    Janet Hernandez PharmD, BCACP  MTM Pharmacist   [facility]   Phone: [phone]    Telemedicine Visit Details  The patient's medications can be safely assessed via a telemedicine encounter.  Type of service:  Telephone visit  Originating Location (pt. Location): Home    Distant Location (provider location):  On-site  Start Time:  3:08 PM  End Time: 3:21 PM     Medication Therapy Recommendations  Ulcerative colitis (H)   1 Rationale: Preventive therapy - Needs additional medication therapy - Indication   Recommendation: Start Medication - SHINGRIX IM   Status: Accepted per CPA   Identified Date: 11/11/2024              "

## 2024-11-11 NOTE — NURSING NOTE
Current patient location: [address]  [facility] [address]    Is the patient currently in the state of [place]? YES    Visit mode:TELEPHONE    If the visit is dropped, the patient can be reconnected by:TELEPHONE VISIT: Phone number:   Telephone Information:   Mobile [phone]       Will anyone else be joining the visit? NO  (If patient encounters technical issues they should call [phone]    Are changes needed to the allergy or medication list? No    Patient denies any changes since echeck-in completion and states all information entered during echeck-in remains accurate.    Are refills needed on medications prescribed by this physician? NA    Rooming Documentation:  Questionnaire(s) not done per department protocol    Most recent vitals were at last infusion per pt     Reason for visit: Medication Therapy Management    URI MarreroF

## 2024-11-12 ENCOUNTER — HOME INFUSION (OUTPATIENT)
Dept: HOME HEALTH SERVICES | Facility: HOME HEALTH | Age: 24
End: 2024-11-12
Payer: COMMERCIAL

## 2024-11-12 DIAGNOSIS — K51.00 ULCERATIVE PANCOLITIS WITHOUT COMPLICATION (H): ICD-10-CM

## 2024-11-17 RX ORDER — INFLIXIMAB 100 MG/10ML
10 INJECTION, POWDER, LYOPHILIZED, FOR SOLUTION INTRAVENOUS
Status: SHIPPED
Start: 2024-11-17

## 2024-11-18 NOTE — PATIENT INSTRUCTIONS
"Recommendations from today's MTM visit:                                                       Continue infliximab 10 mg/kg IV every four weeks.    Jae to consider the following vaccines:  Shingrix series (for Shingles)  Prevnar-20 (for pneumonia)  Repeat the hepatitis B series (at least two more doses)    Reminder to complete yearly skin cancer screening.    Follow-up:    -- 6 months for MTM, sooner if needed    Future Consideration  -- repeat quantiferon    It was great speaking with you today.  I value your experience and would be very thankful for your time in providing feedback in our clinic survey. In the next few days, you may receive an email or text message from [facility] [employer] with a link to a survey related to your  clinical pharmacist.\"     To schedule another MTM appointment, please call the clinic directly or you may call the MTM scheduling line at [phone].    My Clinical Pharmacist's contact information:                                                      Please feel free to contact me with any questions or concerns you have.      Janet MoralesD, BCACP  MTM Pharmacist   [facility]   Phone: [phone]    "

## 2024-11-19 RX ORDER — SODIUM CHLORIDE 9 MG/ML
500 INJECTION, SOLUTION INTRAVENOUS PRN
Qty: 999999 ML | Refills: 0 | Status: ACTIVE | OUTPATIENT
Start: 2024-11-19 | End: 2025-11-19

## 2024-11-19 RX ORDER — WATER 10 ML/10ML
80 INJECTION INTRAMUSCULAR; INTRAVENOUS; SUBCUTANEOUS
Qty: 180 ML | Refills: 0 | Status: DISPENSED | OUTPATIENT
Start: 2024-11-19 | End: 2025-11-19

## 2024-11-19 RX ORDER — INFLIXIMAB 100 MG/10ML
800 INJECTION, POWDER, LYOPHILIZED, FOR SOLUTION INTRAVENOUS
Qty: 18 EACH | Refills: 0 | Status: DISPENSED | OUTPATIENT
Start: 2024-11-19 | End: 2025-11-19

## 2024-11-19 RX ORDER — EPINEPHRINE 0.3 MG/.3ML
0.3 INJECTION SUBCUTANEOUS PRN
Qty: 999999 ML | Refills: 0 | Status: ACTIVE | OUTPATIENT
Start: 2024-11-19 | End: 2025-11-19

## 2024-11-19 RX ORDER — DIPHENHYDRAMINE HYDROCHLORIDE 50 MG/ML
50 INJECTION INTRAMUSCULAR; INTRAVENOUS PRN
Qty: 99999 ML | Refills: 0 | Status: ACTIVE | OUTPATIENT
Start: 2024-11-19 | End: 2025-11-19

## 2024-11-25 ENCOUNTER — TELEPHONE (OUTPATIENT)
Dept: GASTROENTEROLOGY | Facility: CLINIC | Age: 24
End: 2024-11-25
Payer: COMMERCIAL

## 2024-11-25 DIAGNOSIS — K51.00 ULCERATIVE PANCOLITIS WITHOUT COMPLICATION (H): Primary | ICD-10-CM

## 2024-11-25 NOTE — TELEPHONE ENCOUNTER
Therapy plan orders ; plan has been updated. Patient remains on the same medication regimen of Remicade 10mg/kg Q4w. Standing lab orders placed, including BMP. Hepatitis B serologies completed . Quant Gold TB test negative 2024. Last office visit was 10/29/24 with Andrew Winter. Next office visit is scheduled for 25 with same provider. Patient receives infusions at [facility]. MTM established with Janet Hernandez RPH.

## 2024-11-29 ENCOUNTER — HOME INFUSION BILLING (OUTPATIENT)
Dept: HOME HEALTH SERVICES | Facility: HOME HEALTH | Age: 24
End: 2024-11-29
Payer: COMMERCIAL

## 2024-11-29 PROCEDURE — A4930 STERILE, GLOVES PER PAIR: HCPCS

## 2024-11-29 PROCEDURE — S1015 IV TUBING EXTENSION SET: HCPCS

## 2024-11-29 PROCEDURE — A4213 20+ CC SYRINGE ONLY: HCPCS

## 2024-11-29 PROCEDURE — A4215 STERILE NEEDLE: HCPCS

## 2024-12-02 ENCOUNTER — LAB REQUISITION (OUTPATIENT)
Dept: LAB | Facility: CLINIC | Age: 24
End: 2024-12-02
Payer: COMMERCIAL

## 2024-12-02 ENCOUNTER — HOME CARE VISIT (OUTPATIENT)
Dept: HOME HEALTH SERVICES | Facility: HOME HEALTH | Age: 24
End: 2024-12-02
Payer: COMMERCIAL

## 2024-12-02 VITALS
OXYGEN SATURATION: 98 % | SYSTOLIC BLOOD PRESSURE: 132 MMHG | DIASTOLIC BLOOD PRESSURE: 78 MMHG | RESPIRATION RATE: 16 BRPM | HEART RATE: 60 BPM | BODY MASS INDEX: 28.57 KG/M2 | TEMPERATURE: 97.8 F | WEIGHT: 177 LBS

## 2024-12-02 DIAGNOSIS — K51.90 ULCERATIVE COLITIS, UNSPECIFIED, WITHOUT COMPLICATIONS (H): ICD-10-CM

## 2024-12-02 LAB
ALBUMIN SERPL BCG-MCNC: 4.4 G/DL (ref 3.5–5.2)
ALP SERPL-CCNC: 60 U/L (ref 40–150)
ALT SERPL W P-5'-P-CCNC: 23 U/L (ref 0–70)
ANION GAP SERPL CALCULATED.3IONS-SCNC: 7 MMOL/L (ref 7–15)
AST SERPL W P-5'-P-CCNC: 22 U/L (ref 0–45)
BASOPHILS # BLD AUTO: 0 10E3/UL (ref 0–0.2)
BASOPHILS NFR BLD AUTO: 0 %
BILIRUB DIRECT SERPL-MCNC: <0.2 MG/DL (ref 0–0.3)
BILIRUB SERPL-MCNC: 0.9 MG/DL
BUN SERPL-MCNC: 9.5 MG/DL (ref 6–20)
CALCIUM SERPL-MCNC: 9.3 MG/DL (ref 8.8–10.4)
CHLORIDE SERPL-SCNC: 102 MMOL/L (ref 98–107)
CREAT SERPL-MCNC: 1.07 MG/DL (ref 0.67–1.17)
CRP SERPL-MCNC: <3 MG/L
EGFRCR SERPLBLD CKD-EPI 2021: >90 ML/MIN/1.73M2
EOSINOPHIL # BLD AUTO: 0.1 10E3/UL (ref 0–0.7)
EOSINOPHIL NFR BLD AUTO: 2 %
ERYTHROCYTE [DISTWIDTH] IN BLOOD BY AUTOMATED COUNT: 12.8 % (ref 10–15)
ERYTHROCYTE [SEDIMENTATION RATE] IN BLOOD BY WESTERGREN METHOD: 3 MM/HR (ref 0–15)
GLUCOSE SERPL-MCNC: 106 MG/DL (ref 70–99)
HCO3 SERPL-SCNC: 29 MMOL/L (ref 22–29)
HCT VFR BLD AUTO: 48.5 % (ref 40–53)
HGB BLD-MCNC: 16.9 G/DL (ref 13.3–17.7)
IMM GRANULOCYTES # BLD: 0 10E3/UL
IMM GRANULOCYTES NFR BLD: 0 %
LYMPHOCYTES # BLD AUTO: 3 10E3/UL (ref 0.8–5.3)
LYMPHOCYTES NFR BLD AUTO: 41 %
MCH RBC QN AUTO: 31.4 PG (ref 26.5–33)
MCHC RBC AUTO-ENTMCNC: 34.8 G/DL (ref 31.5–36.5)
MCV RBC AUTO: 90 FL (ref 78–100)
MONOCYTES # BLD AUTO: 0.6 10E3/UL (ref 0–1.3)
MONOCYTES NFR BLD AUTO: 8 %
NEUTROPHILS # BLD AUTO: 3.6 10E3/UL (ref 1.6–8.3)
NEUTROPHILS NFR BLD AUTO: 49 %
NRBC # BLD AUTO: 0 10E3/UL
NRBC BLD AUTO-RTO: 0 /100
PLATELET # BLD AUTO: 243 10E3/UL (ref 150–450)
POTASSIUM SERPL-SCNC: 3.9 MMOL/L (ref 3.4–5.3)
PROT SERPL-MCNC: 7.4 G/DL (ref 6.4–8.3)
RBC # BLD AUTO: 5.39 10E6/UL (ref 4.4–5.9)
SODIUM SERPL-SCNC: 138 MMOL/L (ref 135–145)
WBC # BLD AUTO: 7.3 10E3/UL (ref 4–11)

## 2024-12-02 PROCEDURE — 86140 C-REACTIVE PROTEIN: CPT | Performed by: INTERNAL MEDICINE

## 2024-12-02 PROCEDURE — 80053 COMPREHEN METABOLIC PANEL: CPT | Performed by: INTERNAL MEDICINE

## 2024-12-02 PROCEDURE — 82040 ASSAY OF SERUM ALBUMIN: CPT | Performed by: INTERNAL MEDICINE

## 2024-12-02 PROCEDURE — S9359 HIT ANTI-TNF PER DIEM: HCPCS

## 2024-12-02 PROCEDURE — 85652 RBC SED RATE AUTOMATED: CPT | Performed by: INTERNAL MEDICINE

## 2024-12-02 PROCEDURE — 85004 AUTOMATED DIFF WBC COUNT: CPT | Performed by: INTERNAL MEDICINE

## 2024-12-02 PROCEDURE — 85014 HEMATOCRIT: CPT | Performed by: INTERNAL MEDICINE

## 2024-12-02 PROCEDURE — 99601 HOME NFS VISIT <2 HRS: CPT

## 2024-12-02 PROCEDURE — 82248 BILIRUBIN DIRECT: CPT | Performed by: INTERNAL MEDICINE

## 2024-12-02 PROCEDURE — 82435 ASSAY OF BLOOD CHLORIDE: CPT | Performed by: INTERNAL MEDICINE

## 2024-12-02 PROCEDURE — 84295 ASSAY OF SERUM SODIUM: CPT | Performed by: INTERNAL MEDICINE

## 2024-12-02 PROCEDURE — 85048 AUTOMATED LEUKOCYTE COUNT: CPT | Performed by: INTERNAL MEDICINE

## 2024-12-02 PROCEDURE — A4217 STERILE WATER/SALINE, 500 ML: HCPCS

## 2024-12-02 NOTE — PROGRESS NOTES
"Infusion Nursing Note:  Skilled nurse visit today for Remicade for Jae Sparks.   present during visit today: Not Applicable.    Pre-infusion Checklist:   Pre-infusion Checklist    Have you had any delayed reaction since last infusion?   No    Have you recently had an elevated temperature, fever, chills productive cough, coughing for 3 weeks or longer or hemoptysis, abnormal vital signs, night sweats, chest pain, or have you noticed a decrease in your appetite, or noted unexplained weight loss or fatigue?   No    Do you have any open wounds or new incisions?  No    Do you have any recent or upcoming hospitalizations, surgeries, or dental procedures?   No    Do you currently have or recently have had any signs of illness or infection or are you on any antibiotics?   No    Have you had any new, sudden , or worsening abdominal pain?   No    Have you or anyone in your household received a live vaccination in the past 4 weeks?   No    Have you recently been diagnosed with any new nervous system diseases or cancer diagnosis? (i.e., Multiple Sclerosis, Guillain [place], sezures, neurological changes) Are you receiving any form of radiation or chemotherapy?   No    Are you pregnant or breastfeeding, or do you have plans of pregnancy in the future?   No    Have you been having any signs of worsening depression or suicidal ideation?  No    Have there been any other new onset medical symptoms?  No    Did the patient answer \"YES\" to any of the questions above?  No    Will the patient receive a medication that has an order for infusion reaction management?  Yes, and all drugs and supplies are available and none have .    If ordered, has the patient taken pre-medications?  Yes    Plan:   Therapy is appropriate, will proceed with treatment.     Chemotherapy Treatment Conditions:   Not Applicable    Intravenous Access:  Labs drawn without difficulty., Peripheral IV placed., and Labs drawn at 1155 to [facility] " via OTD tracking number [id number].    Post Infusion Assessment:  Patient tolerated infusion without incident.  Blood return noted pre and post infusion.  Site patent and intact, free from redness, edema or discomfort.  Access discontinued per protocol.       Note: Pt alert and oriented in NAD. Status unchanged since last assessment, assessment is negative. Pt took his own Claritin 10mg at 1105, infusion began at noon. Tolerated infusion without incident. No further concerns today.     Saline administered (in mLs): 40    Next Visit Plan:   Dec 30, 2024, no labs      Rosana Jamil RN 12/2/2024

## 2024-12-06 ENCOUNTER — HOME INFUSION (OUTPATIENT)
Dept: HOME HEALTH SERVICES | Facility: HOME HEALTH | Age: 24
End: 2024-12-06
Payer: COMMERCIAL

## 2024-12-30 ENCOUNTER — HOME INFUSION BILLING (OUTPATIENT)
Dept: HOME HEALTH SERVICES | Facility: HOME HEALTH | Age: 24
End: 2024-12-30
Payer: COMMERCIAL

## 2024-12-30 ENCOUNTER — HOME CARE VISIT (OUTPATIENT)
Dept: HOME HEALTH SERVICES | Facility: HOME HEALTH | Age: 24
End: 2024-12-30
Payer: COMMERCIAL

## 2024-12-30 VITALS
DIASTOLIC BLOOD PRESSURE: 64 MMHG | WEIGHT: 180.78 LBS | BODY MASS INDEX: 29.18 KG/M2 | TEMPERATURE: 97.8 F | RESPIRATION RATE: 16 BRPM | OXYGEN SATURATION: 99 % | HEART RATE: 60 BPM | SYSTOLIC BLOOD PRESSURE: 105 MMHG

## 2024-12-30 PROCEDURE — A4215 STERILE NEEDLE: HCPCS

## 2024-12-30 PROCEDURE — S1015 IV TUBING EXTENSION SET: HCPCS

## 2024-12-30 PROCEDURE — S9359 HIT ANTI-TNF PER DIEM: HCPCS

## 2024-12-30 PROCEDURE — A4217 STERILE WATER/SALINE, 500 ML: HCPCS | Mod: JZ

## 2024-12-30 PROCEDURE — A4213 20+ CC SYRINGE ONLY: HCPCS

## 2024-12-30 PROCEDURE — A4930 STERILE, GLOVES PER PAIR: HCPCS

## 2024-12-30 NOTE — PROGRESS NOTES
"Infusion Nursing Note:  Skilled nurse visit today for assessments, Remicade infusion for Jae Sparks.   present during visit today: Not Applicable.    Pre-infusion Checklist:   Pre-infusion Checklist    Have you had any delayed reaction since last infusion?   No    Have you recently had an elevated temperature, fever, chills productive cough, coughing for 3 weeks or longer or hemoptysis, abnormal vital signs, night sweats, chest pain, or have you noticed a decrease in your appetite, or noted unexplained weight loss or fatigue?   No    Do you have any open wounds or new incisions?  No    Do you have any recent or upcoming hospitalizations, surgeries, or dental procedures?   No    Do you currently have or recently have had any signs of illness or infection or are you on any antibiotics?   No    Have you had any new, sudden , or worsening abdominal pain?   No    Have you or anyone in your household received a live vaccination in the past 4 weeks?   No    Have you recently been diagnosed with any new nervous system diseases or cancer diagnosis? (i.e., Multiple Sclerosis, Guillain [place], sezures, neurological changes) Are you receiving any form of radiation or chemotherapy?   No    Are you pregnant or breastfeeding, or do you have plans of pregnancy in the future?   No    Have you been having any signs of worsening depression or suicidal ideation?  No    Have there been any other new onset medical symptoms?  No    Did the patient answer \"YES\" to any of the questions above?  No    Will the patient receive a medication that has an order for infusion reaction management?  Yes, and all drugs and supplies are available and none have .    If ordered, has the patient taken pre-medications?  Yes    Plan:   Therapy is appropriate, will proceed with treatment.     Chemotherapy Treatment Conditions:   Not Applicable    Intravenous Access:  Peripheral IV placed.    Post Infusion Assessment:  Patient tolerated " infusion without incident.  Site patent and intact, free from redness, edema or discomfort.  No evidence of extravasations.  Access discontinued per protocol.       Note: Patient is relaxed, pleasant, up with good tolerance. He denies pain and any adverse health symptoms or concerns.    Saline administered (in mLs): 290    Next Visit Plan:   skilled nurse visit for assessments, labs, Remicade infusion confirmed with patient for       Jenny Argueta RN 12/30/2024

## 2025-01-01 ENCOUNTER — EPISODE UPDATE (OUTPATIENT)
Dept: HOME HEALTH SERVICES | Facility: HOME HEALTH | Age: 25
End: 2025-01-01
Payer: COMMERCIAL

## 2025-01-10 ENCOUNTER — HOME INFUSION (OUTPATIENT)
Dept: HOME HEALTH SERVICES | Facility: HOME HEALTH | Age: 25
End: 2025-01-10
Payer: COMMERCIAL

## 2025-01-14 ENCOUNTER — TELEPHONE (OUTPATIENT)
Dept: GASTROENTEROLOGY | Facility: CLINIC | Age: 25
End: 2025-01-14
Payer: COMMERCIAL

## 2025-01-14 NOTE — TELEPHONE ENCOUNTER
Caller: Jae Sparks      Reason for Reschedule/Cancellation (please be detailed, any staff messages or encounters to note?):   Per order patient needs MAC    Did you cancel or rescheduled an EUS procedure? No.    Is screening questionnaire older than 3 months from the reschedule date.   If Yes, please complete screening questionnaire. Yes    Prior to reschedule please review:  Ordering Provider: SHAYY  Sedation Determined: MAC  Does patient have any ASC Exclusions, please identify?: NO    Notes on Cancelled Procedure:  Procedure: Lower Endoscopy [Colonoscopy]   Date: 1/28   Location: [facility]; [address] [place] [address]  Surgeon: SHAYY    Rescheduled: Yes,   Procedure: Lower Endoscopy [Colonoscopy]    Date: 5/5   Location: [facility]; [address] [place] [address], [address] [place] [address]   Surgeon: SHAYY   Sedation Level Scheduled  MAC ,  Reason for Sedation Level PER ORDER   Instructions updated and sent: MIRTA     Does patient need PAC or Pre -Op Rescheduled? : NO

## 2025-01-24 ENCOUNTER — HOME INFUSION BILLING (OUTPATIENT)
Dept: HOME HEALTH SERVICES | Facility: HOME HEALTH | Age: 25
End: 2025-01-24
Payer: COMMERCIAL

## 2025-01-24 PROCEDURE — A4213 20+ CC SYRINGE ONLY: HCPCS

## 2025-01-24 PROCEDURE — A4215 STERILE NEEDLE: HCPCS

## 2025-01-24 PROCEDURE — A4930 STERILE, GLOVES PER PAIR: HCPCS

## 2025-01-24 PROCEDURE — S1015 IV TUBING EXTENSION SET: HCPCS

## 2025-01-27 ENCOUNTER — HOME CARE VISIT (OUTPATIENT)
Dept: HOME HEALTH SERVICES | Facility: HOME HEALTH | Age: 25
End: 2025-01-27
Payer: COMMERCIAL

## 2025-01-27 ENCOUNTER — LAB REQUISITION (OUTPATIENT)
Dept: LAB | Facility: CLINIC | Age: 25
End: 2025-01-27
Payer: COMMERCIAL

## 2025-01-27 VITALS
OXYGEN SATURATION: 98 % | HEART RATE: 50 BPM | WEIGHT: 178 LBS | SYSTOLIC BLOOD PRESSURE: 126 MMHG | RESPIRATION RATE: 16 BRPM | TEMPERATURE: 97.8 F | DIASTOLIC BLOOD PRESSURE: 70 MMHG | BODY MASS INDEX: 28.73 KG/M2

## 2025-01-27 DIAGNOSIS — K51.90 ULCERATIVE COLITIS, UNSPECIFIED, WITHOUT COMPLICATIONS (H): ICD-10-CM

## 2025-01-27 LAB
ALBUMIN SERPL BCG-MCNC: 4.7 G/DL (ref 3.5–5.2)
ALP SERPL-CCNC: 66 U/L (ref 40–150)
ALT SERPL W P-5'-P-CCNC: 26 U/L (ref 0–70)
ANION GAP SERPL CALCULATED.3IONS-SCNC: 8 MMOL/L (ref 7–15)
AST SERPL W P-5'-P-CCNC: 25 U/L (ref 0–45)
BASOPHILS # BLD AUTO: 0.1 10E3/UL (ref 0–0.2)
BASOPHILS NFR BLD AUTO: 1 %
BILIRUB DIRECT SERPL-MCNC: <0.2 MG/DL (ref 0–0.3)
BILIRUB SERPL-MCNC: 0.5 MG/DL
BUN SERPL-MCNC: 11.8 MG/DL (ref 6–20)
CALCIUM SERPL-MCNC: 9.8 MG/DL (ref 8.8–10.4)
CHLORIDE SERPL-SCNC: 99 MMOL/L (ref 98–107)
CREAT SERPL-MCNC: 1.1 MG/DL (ref 0.67–1.17)
CRP SERPL-MCNC: <3 MG/L
EGFRCR SERPLBLD CKD-EPI 2021: >90 ML/MIN/1.73M2
EOSINOPHIL # BLD AUTO: 0.2 10E3/UL (ref 0–0.7)
EOSINOPHIL NFR BLD AUTO: 2 %
ERYTHROCYTE [DISTWIDTH] IN BLOOD BY AUTOMATED COUNT: 12.8 % (ref 10–15)
ERYTHROCYTE [SEDIMENTATION RATE] IN BLOOD BY WESTERGREN METHOD: 13 MM/HR (ref 0–15)
GLUCOSE SERPL-MCNC: 57 MG/DL (ref 70–99)
HCO3 SERPL-SCNC: 30 MMOL/L (ref 22–29)
HCT VFR BLD AUTO: 50.5 % (ref 40–53)
HGB BLD-MCNC: 17.7 G/DL (ref 13.3–17.7)
IMM GRANULOCYTES # BLD: 0 10E3/UL
IMM GRANULOCYTES NFR BLD: 0 %
LYMPHOCYTES # BLD AUTO: 3.3 10E3/UL (ref 0.8–5.3)
LYMPHOCYTES NFR BLD AUTO: 41 %
MCH RBC QN AUTO: 31.2 PG (ref 26.5–33)
MCHC RBC AUTO-ENTMCNC: 35 G/DL (ref 31.5–36.5)
MCV RBC AUTO: 89 FL (ref 78–100)
MONOCYTES # BLD AUTO: 0.7 10E3/UL (ref 0–1.3)
MONOCYTES NFR BLD AUTO: 8 %
NEUTROPHILS # BLD AUTO: 3.9 10E3/UL (ref 1.6–8.3)
NEUTROPHILS NFR BLD AUTO: 48 %
NRBC # BLD AUTO: 0 10E3/UL
NRBC BLD AUTO-RTO: 0 /100
PLATELET # BLD AUTO: 294 10E3/UL (ref 150–450)
POTASSIUM SERPL-SCNC: 3.7 MMOL/L (ref 3.4–5.3)
PROT SERPL-MCNC: 8.3 G/DL (ref 6.4–8.3)
RBC # BLD AUTO: 5.67 10E6/UL (ref 4.4–5.9)
SODIUM SERPL-SCNC: 137 MMOL/L (ref 135–145)
WBC # BLD AUTO: 8.1 10E3/UL (ref 4–11)

## 2025-01-27 PROCEDURE — 85018 HEMOGLOBIN: CPT | Performed by: INTERNAL MEDICINE

## 2025-01-27 PROCEDURE — S9359 HIT ANTI-TNF PER DIEM: HCPCS

## 2025-01-27 PROCEDURE — 82565 ASSAY OF CREATININE: CPT | Performed by: INTERNAL MEDICINE

## 2025-01-27 PROCEDURE — 99602 HOME NFS VISIT EACH ADDL HR: CPT

## 2025-01-27 PROCEDURE — A4217 STERILE WATER/SALINE, 500 ML: HCPCS | Mod: JZ

## 2025-01-27 PROCEDURE — 86140 C-REACTIVE PROTEIN: CPT | Performed by: INTERNAL MEDICINE

## 2025-01-27 PROCEDURE — 99601 HOME NFS VISIT <2 HRS: CPT

## 2025-01-27 PROCEDURE — 80048 BASIC METABOLIC PNL TOTAL CA: CPT | Performed by: INTERNAL MEDICINE

## 2025-01-27 PROCEDURE — 84132 ASSAY OF SERUM POTASSIUM: CPT | Performed by: INTERNAL MEDICINE

## 2025-01-27 PROCEDURE — 82248 BILIRUBIN DIRECT: CPT | Performed by: INTERNAL MEDICINE

## 2025-01-27 PROCEDURE — 85004 AUTOMATED DIFF WBC COUNT: CPT | Performed by: INTERNAL MEDICINE

## 2025-01-27 PROCEDURE — 85652 RBC SED RATE AUTOMATED: CPT | Performed by: INTERNAL MEDICINE

## 2025-01-27 PROCEDURE — 85041 AUTOMATED RBC COUNT: CPT | Performed by: INTERNAL MEDICINE

## 2025-01-27 PROCEDURE — 84075 ASSAY ALKALINE PHOSPHATASE: CPT | Performed by: INTERNAL MEDICINE

## 2025-01-27 NOTE — PROGRESS NOTES
Nursing Visit Note:  Nurse visit today for Remicade for Jae Sparks.     present during visit today: Not Applicable.    Intravenous Access:  Labs drawn without difficulty. and Peripheral IV placed.    Infusion Nursing Note:    Pre-infusion Checklist:   Have you had any delayed reaction since last infusion?   No     Have you recently had an elevated temperature, fever, chills productive cough, coughing for 3 weeks or longer or hemoptysis, abnormal vital signs, night sweats, chest pain, or have you noticed a decrease in your appetite, or noted unexplained weight loss or fatigue?   No     Do you have any open wounds or new incisions?  No     Do you have any recent or upcoming hospitalizations, surgeries, or dental procedures? Does not include esophagogastroduodenoscopy, colonoscopy, endoscopic retrograde cholangiopancreatography (ERCP), endoscopic ultrasound (EUS), dental procedures or joint aspiration/steroid injections.   No     Do you currently have or recently have had any signs of illness or infection or are you on any antibiotics?   No     Have you had any new, sudden, or worsening abdominal pain?   No     Have you or anyone in your household received a live vaccination in the past 4 weeks?   No     Have you recently been diagnosed with any new nervous system diseases or cancer diagnosis? (i.e., Multiple Sclerosis, Guillain [place], seizures, neurological changes) Are you receiving any form of radiation or chemotherapy?   No     Are you pregnant or breastfeeding, or do you have plans of pregnancy in the future?   No     Have you been having any signs of worsening depression or suicidal ideation?  No     Have you had any other new onset medical symptoms?  No    Entyvio/Ocrevus/Tyasabri only: Have you been having any new or worsening medical problems such as issues with thinking, eyesight, balance or strength that have persisted over several days?   N/A    Benlysta only: Have you been having any signs  "of worsening depression or suicidal ideations?    N/A    IVIG only: Have you had any new blood clots?  N/A    Did the patient answer \"YES\" to any of the questions above?  No     Will the patient receive a medication that has an order for infusion reaction management?  Yes, and all drugs and supplies are available and none have .     If ordered, has the patient taken pre-medications?  Yes    Plan:   Therapy is appropriate, will proceed with treatment.     Post Infusion Assessment:  Patient tolerated infusion without incident.  Blood return noted pre and post infusion.  Site patent and intact, free from redness, edema or discomfort.  No evidence of extravasations.  Access discontinued per protocol.       Note: Pt alert and oriented, in NAD. Took own Claritin at 1100, labs drawn at 1124, ODT tracking number [id number]. Feeling well, no GI symptoms at the present time. No new concerns or complaints.     Saline administered: 40 (ml)    Supply Check:   Does the patient have all the supplies they need for the next visit?  Yes    Next visit plan:     Rosana Jamli RN 2025  "

## 2025-02-07 ENCOUNTER — HOME INFUSION (OUTPATIENT)
Dept: HOME HEALTH SERVICES | Facility: HOME HEALTH | Age: 25
End: 2025-02-07
Payer: COMMERCIAL

## 2025-02-21 ENCOUNTER — HOME INFUSION BILLING (OUTPATIENT)
Dept: HOME HEALTH SERVICES | Facility: HOME HEALTH | Age: 25
End: 2025-02-21
Payer: COMMERCIAL

## 2025-02-21 PROCEDURE — A4930 STERILE, GLOVES PER PAIR: HCPCS

## 2025-02-24 ENCOUNTER — HOME CARE VISIT (OUTPATIENT)
Dept: HOME HEALTH SERVICES | Facility: HOME HEALTH | Age: 25
End: 2025-02-24
Payer: COMMERCIAL

## 2025-02-24 VITALS
HEART RATE: 52 BPM | WEIGHT: 178 LBS | SYSTOLIC BLOOD PRESSURE: 122 MMHG | OXYGEN SATURATION: 99 % | RESPIRATION RATE: 16 BRPM | DIASTOLIC BLOOD PRESSURE: 84 MMHG | TEMPERATURE: 97.5 F | BODY MASS INDEX: 28.73 KG/M2

## 2025-02-24 PROCEDURE — A4217 STERILE WATER/SALINE, 500 ML: HCPCS | Mod: JZ

## 2025-02-24 PROCEDURE — S9359 HIT ANTI-TNF PER DIEM: HCPCS

## 2025-02-24 PROCEDURE — 99602 HOME NFS VISIT EACH ADDL HR: CPT

## 2025-02-24 PROCEDURE — 99601 HOME NFS VISIT <2 HRS: CPT

## 2025-02-24 NOTE — PROGRESS NOTES
Nursing Visit Note:  Nurse visit today for Remicade for Jae Sparks.     present during visit today: Not Applicable.    Intravenous Access:  Peripheral IV placed.    Infusion Nursing Note:    Pre-infusion Checklist:   Have you had any delayed reaction since last infusion?   No     Have you recently had an elevated temperature, fever, chills productive cough, coughing for 3 weeks or longer or hemoptysis, abnormal vital signs, night sweats, chest pain, or have you noticed a decrease in your appetite, or noted unexplained weight loss or fatigue?   No     Do you have any open wounds or new incisions?  No     Do you have any recent or upcoming hospitalizations, surgeries, or dental procedures? Does not include esophagogastroduodenoscopy, colonoscopy, endoscopic retrograde cholangiopancreatography (ERCP), endoscopic ultrasound (EUS), dental procedures or joint aspiration/steroid injections.   No     Do you currently have or recently have had any signs of illness or infection or are you on any antibiotics?   No     Have you had any new, sudden, or worsening abdominal pain?   No     Have you or anyone in your household received a live vaccination in the past 4 weeks?   No     Have you recently been diagnosed with any new nervous system diseases or cancer diagnosis? (i.e., Multiple Sclerosis, Guillain [place], seizures, neurological changes) Are you receiving any form of radiation or chemotherapy?   No     Are you pregnant or breastfeeding, or do you have plans of pregnancy in the future?   No     Have you been having any signs of worsening depression or suicidal ideation?  No     Have you had any other new onset medical symptoms?  No    Entyvio/Ocrevus/Tyasabri only: Have you been having any new or worsening medical problems such as issues with thinking, eyesight, balance or strength that have persisted over several days?   N/A    Benlysta only: Have you been having any signs of worsening depression or  "suicidal ideations?    N/A    IVIG only: Have you had any new blood clots?  N/A    Did the patient answer \"YES\" to any of the questions above?  No     Will the patient receive a medication that has an order for infusion reaction management?  Yes, and all drugs and supplies are available and none have .     If ordered, has the patient taken pre-medications?  Yes    Plan:   Therapy is appropriate, will proceed with treatment.     Post Infusion Assessment:  Patient tolerated infusion without incident.  Blood return noted pre and post infusion.  Site patent and intact, free from redness, edema or discomfort.  No evidence of extravasations.  Access discontinued per protocol.       Note: Jae is alert and oriented, in NAD. Assessment is negative. Tolerated infusion well today.     Saline administered: 40 (ml)    Supply Check:   Does the patient have all the supplies they need for the next visit?  No, Order placed for PIV kits and needles    Next visit plan: patient would like to infuse on     Rosana Jamil RN 2025  "

## 2025-02-27 ENCOUNTER — HOME INFUSION (OUTPATIENT)
Dept: HOME HEALTH SERVICES | Facility: HOME HEALTH | Age: 25
End: 2025-02-27
Payer: COMMERCIAL

## 2025-03-20 ENCOUNTER — HOME INFUSION BILLING (OUTPATIENT)
Dept: HOME HEALTH SERVICES | Facility: HOME HEALTH | Age: 25
End: 2025-03-20
Payer: COMMERCIAL

## 2025-03-20 PROCEDURE — A4930 STERILE, GLOVES PER PAIR: HCPCS

## 2025-03-21 ENCOUNTER — LAB REQUISITION (OUTPATIENT)
Dept: LAB | Facility: CLINIC | Age: 25
End: 2025-03-21
Payer: COMMERCIAL

## 2025-03-21 DIAGNOSIS — K51.90 ULCERATIVE COLITIS, UNSPECIFIED, WITHOUT COMPLICATIONS (H): ICD-10-CM

## 2025-03-21 LAB
ALBUMIN SERPL BCG-MCNC: 4.4 G/DL (ref 3.5–5.2)
ALP SERPL-CCNC: 60 U/L (ref 40–150)
ALT SERPL W P-5'-P-CCNC: 24 U/L (ref 0–70)
ANION GAP SERPL CALCULATED.3IONS-SCNC: 14 MMOL/L (ref 7–15)
AST SERPL W P-5'-P-CCNC: 28 U/L (ref 0–45)
BASOPHILS # BLD AUTO: 0.1 10E3/UL (ref 0–0.2)
BASOPHILS NFR BLD AUTO: 1 %
BILIRUB DIRECT SERPL-MCNC: 0.11 MG/DL (ref 0–0.3)
BILIRUB SERPL-MCNC: 0.5 MG/DL
BUN SERPL-MCNC: 9.1 MG/DL (ref 6–20)
CALCIUM SERPL-MCNC: 9.6 MG/DL (ref 8.8–10.4)
CHLORIDE SERPL-SCNC: 101 MMOL/L (ref 98–107)
CREAT SERPL-MCNC: 0.96 MG/DL (ref 0.67–1.17)
CRP SERPL-MCNC: <3 MG/L
EGFRCR SERPLBLD CKD-EPI 2021: >90 ML/MIN/1.73M2
EOSINOPHIL # BLD AUTO: 0.1 10E3/UL (ref 0–0.7)
EOSINOPHIL NFR BLD AUTO: 2 %
ERYTHROCYTE [DISTWIDTH] IN BLOOD BY AUTOMATED COUNT: 12.5 % (ref 10–15)
ERYTHROCYTE [SEDIMENTATION RATE] IN BLOOD BY WESTERGREN METHOD: 6 MM/HR (ref 0–15)
GLUCOSE SERPL-MCNC: 123 MG/DL (ref 70–99)
HCO3 SERPL-SCNC: 23 MMOL/L (ref 22–29)
HCT VFR BLD AUTO: 48.3 % (ref 40–53)
HGB BLD-MCNC: 16.7 G/DL (ref 13.3–17.7)
IMM GRANULOCYTES # BLD: 0 10E3/UL
IMM GRANULOCYTES NFR BLD: 0 %
LYMPHOCYTES # BLD AUTO: 3.4 10E3/UL (ref 0.8–5.3)
LYMPHOCYTES NFR BLD AUTO: 40 %
MCH RBC QN AUTO: 30.3 PG (ref 26.5–33)
MCHC RBC AUTO-ENTMCNC: 34.6 G/DL (ref 31.5–36.5)
MCV RBC AUTO: 88 FL (ref 78–100)
MONOCYTES # BLD AUTO: 0.5 10E3/UL (ref 0–1.3)
MONOCYTES NFR BLD AUTO: 6 %
NEUTROPHILS # BLD AUTO: 4.5 10E3/UL (ref 1.6–8.3)
NEUTROPHILS NFR BLD AUTO: 52 %
NRBC # BLD AUTO: 0 10E3/UL
NRBC BLD AUTO-RTO: 0 /100
PLATELET # BLD AUTO: 268 10E3/UL (ref 150–450)
POTASSIUM SERPL-SCNC: 4.1 MMOL/L (ref 3.4–5.3)
PROT SERPL-MCNC: 7.6 G/DL (ref 6.4–8.3)
RBC # BLD AUTO: 5.52 10E6/UL (ref 4.4–5.9)
SODIUM SERPL-SCNC: 138 MMOL/L (ref 135–145)
WBC # BLD AUTO: 8.7 10E3/UL (ref 4–11)

## 2025-03-21 PROCEDURE — 86140 C-REACTIVE PROTEIN: CPT | Performed by: INTERNAL MEDICINE

## 2025-03-21 PROCEDURE — 85025 COMPLETE CBC W/AUTO DIFF WBC: CPT | Performed by: INTERNAL MEDICINE

## 2025-03-21 PROCEDURE — 85652 RBC SED RATE AUTOMATED: CPT | Performed by: INTERNAL MEDICINE

## 2025-03-21 PROCEDURE — 82248 BILIRUBIN DIRECT: CPT | Performed by: INTERNAL MEDICINE

## 2025-03-21 PROCEDURE — 80053 COMPREHEN METABOLIC PANEL: CPT | Performed by: INTERNAL MEDICINE

## 2025-03-21 PROCEDURE — A4217 STERILE WATER/SALINE, 500 ML: HCPCS | Mod: JZ

## 2025-03-21 PROCEDURE — S9359 HIT ANTI-TNF PER DIEM: HCPCS

## 2025-04-16 ENCOUNTER — EPISODE UPDATE (OUTPATIENT)
Dept: HOME HEALTH SERVICES | Facility: HOME HEALTH | Age: 25
End: 2025-04-16
Payer: COMMERCIAL

## 2025-04-17 ENCOUNTER — HOME INFUSION BILLING (OUTPATIENT)
Dept: HOME HEALTH SERVICES | Facility: HOME HEALTH | Age: 25
End: 2025-04-17
Payer: COMMERCIAL

## 2025-04-26 ENCOUNTER — HEALTH MAINTENANCE LETTER (OUTPATIENT)
Age: 25
End: 2025-04-26

## 2025-04-29 ENCOUNTER — ANESTHESIA EVENT (OUTPATIENT)
Dept: SURGERY | Facility: AMBULATORY SURGERY CENTER | Age: 25
End: 2025-04-29
Payer: COMMERCIAL

## 2025-04-29 RX ORDER — ONDANSETRON 4 MG/1
4 TABLET, ORALLY DISINTEGRATING ORAL EVERY 30 MIN PRN
Status: CANCELLED | OUTPATIENT
Start: 2025-04-29

## 2025-04-29 RX ORDER — HYDROMORPHONE HYDROCHLORIDE 1 MG/ML
0.4 INJECTION, SOLUTION INTRAMUSCULAR; INTRAVENOUS; SUBCUTANEOUS EVERY 5 MIN PRN
Status: CANCELLED | OUTPATIENT
Start: 2025-04-29

## 2025-04-29 RX ORDER — DEXAMETHASONE SODIUM PHOSPHATE 10 MG/ML
4 INJECTION, SOLUTION INTRAMUSCULAR; INTRAVENOUS
Status: CANCELLED | OUTPATIENT
Start: 2025-04-29

## 2025-04-29 RX ORDER — FENTANYL CITRATE 50 UG/ML
25 INJECTION, SOLUTION INTRAMUSCULAR; INTRAVENOUS EVERY 5 MIN PRN
Status: CANCELLED | OUTPATIENT
Start: 2025-04-29

## 2025-04-29 RX ORDER — NALOXONE HYDROCHLORIDE 0.4 MG/ML
0.1 INJECTION, SOLUTION INTRAMUSCULAR; INTRAVENOUS; SUBCUTANEOUS
Status: CANCELLED | OUTPATIENT
Start: 2025-04-29

## 2025-04-29 RX ORDER — SODIUM CHLORIDE, SODIUM LACTATE, POTASSIUM CHLORIDE, CALCIUM CHLORIDE 600; 310; 30; 20 MG/100ML; MG/100ML; MG/100ML; MG/100ML
INJECTION, SOLUTION INTRAVENOUS CONTINUOUS
Status: CANCELLED | OUTPATIENT
Start: 2025-04-29

## 2025-04-29 RX ORDER — LABETALOL HYDROCHLORIDE 5 MG/ML
10 INJECTION, SOLUTION INTRAVENOUS
Status: CANCELLED | OUTPATIENT
Start: 2025-04-29

## 2025-04-29 RX ORDER — HYDROMORPHONE HYDROCHLORIDE 1 MG/ML
0.2 INJECTION, SOLUTION INTRAMUSCULAR; INTRAVENOUS; SUBCUTANEOUS EVERY 5 MIN PRN
Status: CANCELLED | OUTPATIENT
Start: 2025-04-29

## 2025-04-29 RX ORDER — OXYCODONE HYDROCHLORIDE 5 MG/1
10 TABLET ORAL
Status: CANCELLED | OUTPATIENT
Start: 2025-04-29

## 2025-04-29 RX ORDER — FENTANYL CITRATE 50 UG/ML
50 INJECTION, SOLUTION INTRAMUSCULAR; INTRAVENOUS EVERY 5 MIN PRN
Status: CANCELLED | OUTPATIENT
Start: 2025-04-29

## 2025-04-29 RX ORDER — ONDANSETRON 2 MG/ML
4 INJECTION INTRAMUSCULAR; INTRAVENOUS EVERY 30 MIN PRN
Status: CANCELLED | OUTPATIENT
Start: 2025-04-29

## 2025-04-29 RX ORDER — OXYCODONE HYDROCHLORIDE 5 MG/1
5 TABLET ORAL
Status: CANCELLED | OUTPATIENT
Start: 2025-04-29

## 2025-05-02 RX ORDER — NALOXONE HYDROCHLORIDE 0.4 MG/ML
0.2 INJECTION, SOLUTION INTRAMUSCULAR; INTRAVENOUS; SUBCUTANEOUS
Status: CANCELLED | OUTPATIENT
Start: 2025-05-02

## 2025-05-02 RX ORDER — FLUMAZENIL 0.1 MG/ML
0.2 INJECTION, SOLUTION INTRAVENOUS
Status: CANCELLED | OUTPATIENT
Start: 2025-05-02 | End: 2025-05-03

## 2025-05-02 RX ORDER — ONDANSETRON 2 MG/ML
4 INJECTION INTRAMUSCULAR; INTRAVENOUS EVERY 6 HOURS PRN
Status: CANCELLED | OUTPATIENT
Start: 2025-05-02

## 2025-05-02 RX ORDER — ONDANSETRON 4 MG/1
4 TABLET, ORALLY DISINTEGRATING ORAL EVERY 6 HOURS PRN
Status: CANCELLED | OUTPATIENT
Start: 2025-05-02

## 2025-05-02 RX ORDER — NALOXONE HYDROCHLORIDE 0.4 MG/ML
0.4 INJECTION, SOLUTION INTRAMUSCULAR; INTRAVENOUS; SUBCUTANEOUS
Status: CANCELLED | OUTPATIENT
Start: 2025-05-02

## 2025-05-02 RX ORDER — PROCHLORPERAZINE MALEATE 10 MG
10 TABLET ORAL EVERY 6 HOURS PRN
Status: CANCELLED | OUTPATIENT
Start: 2025-05-02

## 2025-05-05 ENCOUNTER — ANESTHESIA (OUTPATIENT)
Dept: SURGERY | Facility: AMBULATORY SURGERY CENTER | Age: 25
End: 2025-05-05
Payer: COMMERCIAL

## 2025-05-05 ENCOUNTER — HOSPITAL ENCOUNTER (OUTPATIENT)
Facility: AMBULATORY SURGERY CENTER | Age: 25
Discharge: HOME | End: 2025-05-05
Attending: INTERNAL MEDICINE
Payer: COMMERCIAL

## 2025-05-05 VITALS
DIASTOLIC BLOOD PRESSURE: 70 MMHG | RESPIRATION RATE: 16 BRPM | HEART RATE: 63 BPM | OXYGEN SATURATION: 100 % | TEMPERATURE: 97.4 F | SYSTOLIC BLOOD PRESSURE: 108 MMHG

## 2025-05-05 VITALS — HEART RATE: 52 BPM

## 2025-05-05 LAB — COLONOSCOPY: NORMAL

## 2025-05-05 PROCEDURE — 88305 TISSUE EXAM BY PATHOLOGIST: CPT | Mod: 26 | Performed by: PATHOLOGY

## 2025-05-05 PROCEDURE — 88305 TISSUE EXAM BY PATHOLOGIST: CPT | Mod: TC | Performed by: INTERNAL MEDICINE

## 2025-05-05 RX ORDER — PROPOFOL 10 MG/ML
INJECTION, EMULSION INTRAVENOUS PRN
Status: DISCONTINUED | OUTPATIENT
Start: 2025-05-05 | End: 2025-05-05

## 2025-05-05 RX ORDER — SODIUM CHLORIDE, SODIUM LACTATE, POTASSIUM CHLORIDE, CALCIUM CHLORIDE 600; 310; 30; 20 MG/100ML; MG/100ML; MG/100ML; MG/100ML
INJECTION, SOLUTION INTRAVENOUS CONTINUOUS
Status: DISCONTINUED | OUTPATIENT
Start: 2025-05-05 | End: 2025-05-06 | Stop reason: HOSPADM

## 2025-05-05 RX ORDER — LIDOCAINE 40 MG/G
CREAM TOPICAL
Status: DISCONTINUED | OUTPATIENT
Start: 2025-05-05 | End: 2025-05-06 | Stop reason: HOSPADM

## 2025-05-05 RX ORDER — ACETAMINOPHEN 325 MG/1
975 TABLET ORAL ONCE
Status: COMPLETED | OUTPATIENT
Start: 2025-05-05 | End: 2025-05-05

## 2025-05-05 RX ORDER — PROPOFOL 10 MG/ML
INJECTION, EMULSION INTRAVENOUS CONTINUOUS PRN
Status: DISCONTINUED | OUTPATIENT
Start: 2025-05-05 | End: 2025-05-05

## 2025-05-05 RX ORDER — LIDOCAINE HYDROCHLORIDE 20 MG/ML
INJECTION, SOLUTION INFILTRATION; PERINEURAL PRN
Status: DISCONTINUED | OUTPATIENT
Start: 2025-05-05 | End: 2025-05-05

## 2025-05-05 RX ORDER — ONDANSETRON 2 MG/ML
4 INJECTION INTRAMUSCULAR; INTRAVENOUS
Status: DISCONTINUED | OUTPATIENT
Start: 2025-05-05 | End: 2025-05-06 | Stop reason: HOSPADM

## 2025-05-05 RX ORDER — LORATADINE 10 MG/1
10 TABLET ORAL DAILY
COMMUNITY

## 2025-05-05 RX ADMIN — PROPOFOL 150 MCG/KG/MIN: 10 INJECTION, EMULSION INTRAVENOUS at 15:15

## 2025-05-05 RX ADMIN — PROPOFOL 50 MG: 10 INJECTION, EMULSION INTRAVENOUS at 15:19

## 2025-05-05 RX ADMIN — SODIUM CHLORIDE, SODIUM LACTATE, POTASSIUM CHLORIDE, CALCIUM CHLORIDE: 600; 310; 30; 20 INJECTION, SOLUTION INTRAVENOUS at 13:58

## 2025-05-05 RX ADMIN — PROPOFOL 50 MG: 10 INJECTION, EMULSION INTRAVENOUS at 15:23

## 2025-05-05 RX ADMIN — LIDOCAINE HYDROCHLORIDE 40 MG: 20 INJECTION, SOLUTION INFILTRATION; PERINEURAL at 15:15

## 2025-05-05 RX ADMIN — PROPOFOL 100 MG: 10 INJECTION, EMULSION INTRAVENOUS at 15:15

## 2025-05-05 ASSESSMENT — ENCOUNTER SYMPTOMS: ORTHOPNEA: 0

## 2025-05-05 NOTE — ANESTHESIA PREPROCEDURE EVALUATION
Anesthesia Pre-Procedure Evaluation    Patient: Jae Sparks   MRN: [id number] : 2000        Procedure : Procedure(s):  Colonoscopy          Past Medical History:   Diagnosis Date    Uncomplicated asthma       Past Surgical History:   Procedure Laterality Date    NO HISTORY OF SURGERY        Allergies   Allergen Reactions    Pollen Extract Itching    Cantaloupe (Diagnostic) Hives and Itching    Cats       Social History     Tobacco Use    Smoking status: Never    Smokeless tobacco: Never   Substance Use Topics    Alcohol use: Not Currently      Wt Readings from Last 1 Encounters:   25 80.7 kg (178 lb)        Anesthesia Evaluation   Pt has had prior anesthetic. Type: MAC.    No history of anesthetic complications       ROS/MED HX  ENT/Pulmonary:     (+)                     Intermittent, asthma Last exacerbation: >6 mo ago,  Treatment: Inhaler prn,              (-) recent URI   Neurologic:       Cardiovascular:    (-) LANDA, orthopnea/PND and syncope   METS/Exercise Tolerance: >4 METS    Hematologic:       Musculoskeletal:       GI/Hepatic: Comment: Ulcerative colitis   (-) GERD   Renal/Genitourinary:       Endo:       Psychiatric/Substance Use:       Infectious Disease:       Malignancy:       Other:            Physical Exam    Airway        Mallampati: I   TM distance: > 3 FB   Neck ROM: full   Mouth opening: > 3 cm    Respiratory Devices and Support         Dental     Comment: No apparent abnormalities        Cardiovascular          Rhythm and rate: regular and normal     Pulmonary           breath sounds clear to auscultation           OUTSIDE LABS:  CBC:   Lab Results   Component Value Date    WBC 8.7 2025    WBC 8.1 2025    HGB 16.7 2025    HGB 17.7 2025    HCT 48.3 2025    HCT 50.5 2025     2025     2025     BMP:   Lab Results   Component Value Date     2025     2025    POTASSIUM 4.1 2025    POTASSIUM 3.7  "01/27/2025    CHLORIDE 101 03/21/2025    CHLORIDE 99 01/27/2025    CO2 23 03/21/2025    CO2 30 (H) 01/27/2025    BUN 9.1 03/21/2025    BUN 11.8 01/27/2025    CR 0.96 03/21/2025    CR 1.10 01/27/2025     (H) 03/21/2025    GLC 57 (L) 01/27/2025     COAGS: No results found for: \"PTT\", \"INR\", \"FIBR\"  POC: No results found for: \"BGM\", \"HCG\", \"HCGS\"  HEPATIC:   Lab Results   Component Value Date    ALBUMIN 4.4 03/21/2025    PROTTOTAL 7.6 03/21/2025    ALT 24 03/21/2025    AST 28 03/21/2025    ALKPHOS 60 03/21/2025    BILITOTAL 0.5 03/21/2025     OTHER:   Lab Results   Component Value Date    KRISTA 9.6 03/21/2025    TSH 1.61 01/14/2022    CRP <2.9 01/12/2023    SED 6 03/21/2025       Anesthesia Plan    ASA Status:  2    NPO Status:  NPO Appropriate    Anesthesia Type: MAC.              Consents    Anesthesia Plan(s) and associated risks, benefits, and realistic alternatives discussed. Questions answered and patient/representative(s) expressed understanding.     - Discussed:     - Discussed with:  Patient            Postoperative Care            Comments:    Other Comments: Discussed plan for MAC, including possibility of awareness, risk of aspiration pneumonia, risk of hypoxia/low oxygen/airway obstruction requiring additional airway support/devices. Discussed alternatives. Discussed backup plan of general anesthesia and intubation. Ensured understanding, invited questions and all questions were answered.               Dorie Beckham MD    Clinically Significant Risk Factors Present on Admission                                 # Asthma: noted on problem list          "

## 2025-05-05 NOTE — ANESTHESIA POSTPROCEDURE EVALUATION
Patient: Jae Sparks    Procedure: Procedure(s):  COLONOSCOPY, WITH POLYPECTOMY AND BIOPSIES       Anesthesia Type:  MAC    Note:  Disposition: Outpatient   Postop Pain Control: Uneventful            Sign Out: Well controlled pain   PONV: No   Neuro/Psych: Uneventful            Sign Out: Acceptable/Baseline neuro status   Airway/Respiratory: Uneventful            Sign Out: Acceptable/Baseline resp. status   CV/Hemodynamics: Uneventful            Sign Out: Acceptable CV status; No obvious hypovolemia; No obvious fluid overload   Other NRE:    DID A NON-ROUTINE EVENT OCCUR?            Last vitals:  Vitals Value Taken Time   /70 05/05/25 1600   Temp 36.3  C (97.4  F) 05/05/25 1600   Pulse 76 05/05/25 1555   Resp 16 05/05/25 1600   SpO2 100 % 05/05/25 1600   Vitals shown include unfiled device data.    Electronically Signed By: Dorie Beckham MD  May 5, 2025  4:18 PM

## 2025-05-05 NOTE — ANESTHESIA CARE TRANSFER NOTE
Patient: Jae Sparks    Procedure: Procedure(s):  COLONOSCOPY, WITH POLYPECTOMY AND BIOPSIES       Diagnosis: Ulcerative pancolitis without complication (H) [K51.00]  Diagnosis Additional Information: No value filed.    Anesthesia Type:   MAC     Note:    Oropharynx: oropharynx clear of all foreign objects and spontaneously breathing  Level of Consciousness: awake  Oxygen Supplementation: room air    Independent Airway: airway patency satisfactory and stable  Dentition: dentition unchanged  Vital Signs Stable: post-procedure vital signs reviewed and stable  Report to RN Given: handoff report given  Patient transferred to: Phase II    Handoff Report: Identifed the Patient, Identified the Reponsible Provider, Reviewed the pertinent medical history, Discussed the surgical course, Reviewed Intra-OP anesthesia mangement and issues during anesthesia, Set expectations for post-procedure period and Allowed opportunity for questions and acknowledgement of understanding      Vitals:  Vitals Value Taken Time   /69 05/05/25 1545   Temp 36.3  C (97.3  F) 05/05/25 1545   Pulse 63 05/05/25 1545   Resp 16 05/05/25 1545   SpO2 99 % 05/05/25 1551   Vitals shown include unfiled device data.    Electronically Signed By: CHIQUI Johnson CRNA  May 5, 2025  3:52 PM

## 2025-05-05 NOTE — H&P
"Jae S [facility]  [id number]  male  25 year old      Reason for procedure/surgery: ulcerative pancolitis    Patient Active Problem List   Diagnosis    Ulcerative colitis (H)    Mild intermittent asthma, unspecified whether complicated       Past Surgical History:    Past Surgical History:   Procedure Laterality Date    NO HISTORY OF SURGERY         Past Medical History:   Past Medical History:   Diagnosis Date    Uncomplicated asthma        Social History:   Social History     Tobacco Use    Smoking status: Never    Smokeless tobacco: Never   Substance Use Topics    Alcohol use: Not Currently       Family History:   Family History   Problem Relation Age of Onset    Inflammatory Bowel Disease No family hx of     Autoimmune Disease No family hx of     Colon Cancer No family hx of        Allergies:   Allergies   Allergen Reactions    Pollen Extract Itching    Cantaloupe (Diagnostic) Hives and Itching    Cats        Active Medications:   Current Outpatient Medications   Medication Sig Dispense Refill    inFLIXimab-dyyb (INFLECTRA IV)       albuterol (PROAIR HFA/PROVENTIL HFA/VENTOLIN HFA) 108 (90 Base) MCG/ACT inhaler Inhale 1-2 puffs into the lungs every 4 hours as needed 18 g 1    diphenhydrAMINE (BENADRYL) 50 MG/ML injection Inject 1 mL (50 mg) over 3-5 minutes into the vein via push as needed for other (infusion reaction). For RN use only.  Draw up in a syringe and administer IV push.  Discard remainder of vial. 52446 mL 0    Emergency Supply Kit, PIV, Patient use for emergency only. Contents: 3 sodium chloride 0.9% flushes, 1 IV start kit, 1 microclave ext set 14\", 1 each IV Cath 22 G/1\" and 24G/3/4\", 6 alcohol prep pads, 4 nitrile gloves (med). Call [phone] to reorder. 135382 kit 0    EPINEPHrine (ANY BX GENERIC EQUIV) 0.3 MG/0.3ML injection 2-pack Inject 0.3 mLs (0.3 mg) into the muscle as needed for anaphylaxis (infusion reaction). Administer into the mid-thigh in case of severe anaphylaxis (wheezing, " throat tightening, mouth swelling, difficulty breathing). May repeat dose one time in 5-15 minutes if symptoms persist. [id number] mL 0    fexofenadine (ALLEGRA) 180 MG tablet Take 180 mg by mouth daily as needed for allergies (in spring and summer)      fluticasone furoate 27.5 MCG/SPRAY nasal spray Spray 2 sprays into both nostrils daily as needed for rhinitis or allergies (seasonally)      inFLIXimab (REMICADE) 100 MG injection Add to infusion 80 mLs (800 mg) every 4 weeks. Reconstitute infliximab vial(s).  Draw up infliximab 10 mg/mL in syringe with 21 G needle and add to NaCl 0.9% bag immediately prior to infusing.  MIX GENTLY BY INVERSION, DO NOT SHAKE. Discard remainder of vial(s). 939923 each 0    inFLIXimab, REMICADE, 100 MG injection Inject 798 mg into the vein every 28 days.      methylPREDNISolone Na Suc, PF, (SOLU-MEDROL) 125 mg/2 mL injection Inject 2 mLs (125 mg) over 3-5 minutes into the vein via push as needed (infusion reaction). For RN use only.  Reconstitute vial. Draw up methylPREDNISolone in a syringe and administer.  Discard remainder of vial. [id number] mL 0    omeprazole (PRILOSEC) 40 MG DR capsule Take 40 mg by mouth daily as needed       sodium chloride 0.9% bag Infuse 250 mLs over 1 hours into the vein every 4 weeks. Add 80 mL (800 mg) of infliximab 10 mg/mL to bag immediately prior to infusing via gravity infusion. When complete, flush bag with 20 mL NS to flush tubing. [id number] mL 0    sodium chloride 0.9% infusion Infuse 500 mLs into the vein as needed for other (infusion reaction). In case of mild reaction, administer via gravity at 20 mL/hr to keep vein open. In case of severe reaction, administer via gravity wide open on prime setting. 341372 mL 0    sodium chloride, PF, 0.9% PF flush Inject 10 mLs into the vein as needed for other (infusion reaction). For RN use only as needed for infusion reaction 624802 mL 0    sodium chloride, PF, 0.9% PF flush Inject 10 mLs into the vein as needed for  line flush. Flush IV before and after medication administration as directed and/or at least every 12 hours. [id number] mL 0    sterile water, preservative free, injection Use 80 mLs for reconstitution every 4 weeks. 1. Reconstitute each vial of infliximab with 10 mL of sterile water for injection by slowly injecting 10 mL sterile water down the inside wall of vial w/21 G needle. DO NOT SHAKE. Foaming of the solution on reconstitution is not unusual. Roll and tilt each vial gently.  2. Let drug stand for 5 minutes. 3. Inspect vials for particules and/or discoloration prior to continuing. The solution should be colorless to light yellow and opalescent, and may develop a few translucent particles. DO NOT USE IF DRUG HAS NOT FULLY DISSOLVED OR IF OPAQUE PARTICLES, DISCOLORATION OR OTHER FOREIGN PARTICULES ARE PRESENT. 4. Draw up appropriate dose w/ 21 G needle from vial. [id number] mL 0    Vitamin D3 (CHOLECALCIFEROL) 25 mcg (1000 units) tablet Take 1 tablet by mouth daily.         Systemic Review:   CONSTITUTIONAL: NEGATIVE for fever, chills, change in weight  ENT/MOUTH: NEGATIVE for ear, mouth and throat problems  RESP: NEGATIVE for significant cough or SOB  CV: NEGATIVE for chest pain, palpitations or peripheral edema    Physical Examination:   Vital Signs: Temp 97.4  F (36.3  C) (Temporal)   SpO2 100%   GENERAL: healthy, alert and no distress  NECK: no adenopathy, no asymmetry, masses, or scars  RESP: lungs clear to auscultation - no rales, rhonchi or wheezes  CV: regular rate and rhythm, normal S1 S2, no S3 or S4, no murmur, click or rub, no peripheral edema and peripheral pulses strong  ABDOMEN: soft, nontender, no hepatosplenomegaly, no masses and bowel sounds normal  MS: no gross musculoskeletal defects noted, no edema    Plan: Appropriate to proceed as scheduled.      Jigna Eng MD  5/5/2025    PCP:  Deon Ramirez

## 2025-05-08 LAB
PATH REPORT.COMMENTS IMP SPEC: NORMAL
PATH REPORT.FINAL DX SPEC: NORMAL
PATH REPORT.GROSS SPEC: NORMAL
PATH REPORT.MICROSCOPIC SPEC OTHER STN: NORMAL
PATH REPORT.RELEVANT HX SPEC: NORMAL
PHOTO IMAGE: NORMAL

## 2025-05-13 ENCOUNTER — HOME INFUSION (OUTPATIENT)
Dept: HOME HEALTH SERVICES | Facility: HOME HEALTH | Age: 25
End: 2025-05-13
Payer: COMMERCIAL

## 2025-05-15 ENCOUNTER — HOME INFUSION BILLING (OUTPATIENT)
Dept: HOME HEALTH SERVICES | Facility: HOME HEALTH | Age: 25
End: 2025-05-15
Payer: COMMERCIAL

## 2025-05-15 PROCEDURE — A4215 STERILE NEEDLE: HCPCS

## 2025-05-17 ENCOUNTER — RESULTS FOLLOW-UP (OUTPATIENT)
Dept: GASTROENTEROLOGY | Facility: CLINIC | Age: 25
End: 2025-05-17

## 2025-05-19 PROCEDURE — S9359 HIT ANTI-TNF PER DIEM: HCPCS

## 2025-05-19 PROCEDURE — A4217 STERILE WATER/SALINE, 500 ML: HCPCS | Mod: JZ

## 2025-05-21 ENCOUNTER — HOME INFUSION BILLING (OUTPATIENT)
Dept: HOME HEALTH SERVICES | Facility: HOME HEALTH | Age: 25
End: 2025-05-21
Payer: COMMERCIAL

## 2025-05-22 ENCOUNTER — HOME CARE VISIT (OUTPATIENT)
Dept: HOME HEALTH SERVICES | Facility: HOME HEALTH | Age: 25
End: 2025-05-22
Payer: COMMERCIAL

## 2025-05-22 ENCOUNTER — LAB REQUISITION (OUTPATIENT)
Dept: LAB | Facility: CLINIC | Age: 25
End: 2025-05-22
Payer: COMMERCIAL

## 2025-05-22 VITALS
SYSTOLIC BLOOD PRESSURE: 136 MMHG | RESPIRATION RATE: 16 BRPM | DIASTOLIC BLOOD PRESSURE: 68 MMHG | BODY MASS INDEX: 29.05 KG/M2 | OXYGEN SATURATION: 98 % | HEART RATE: 58 BPM | WEIGHT: 180 LBS | TEMPERATURE: 97.7 F

## 2025-05-22 DIAGNOSIS — K51.90 ULCERATIVE COLITIS, UNSPECIFIED, WITHOUT COMPLICATIONS (H): ICD-10-CM

## 2025-05-22 LAB
ALBUMIN SERPL BCG-MCNC: 4.6 G/DL (ref 3.5–5.2)
ALP SERPL-CCNC: 83 U/L (ref 40–150)
ALT SERPL W P-5'-P-CCNC: 21 U/L (ref 0–70)
ANION GAP SERPL CALCULATED.3IONS-SCNC: 12 MMOL/L (ref 7–15)
AST SERPL W P-5'-P-CCNC: 22 U/L (ref 0–45)
BASOPHILS # BLD AUTO: 0.1 10E3/UL (ref 0–0.2)
BASOPHILS NFR BLD AUTO: 1 %
BILIRUB DIRECT SERPL-MCNC: 0.12 MG/DL (ref 0–0.3)
BILIRUB SERPL-MCNC: 0.3 MG/DL
BUN SERPL-MCNC: 15.6 MG/DL (ref 6–20)
CALCIUM SERPL-MCNC: 9.7 MG/DL (ref 8.8–10.4)
CHLORIDE SERPL-SCNC: 101 MMOL/L (ref 98–107)
CREAT SERPL-MCNC: 0.98 MG/DL (ref 0.67–1.17)
CRP SERPL-MCNC: <3 MG/L
EGFRCR SERPLBLD CKD-EPI 2021: >90 ML/MIN/1.73M2
EOSINOPHIL # BLD AUTO: 0.2 10E3/UL (ref 0–0.7)
EOSINOPHIL NFR BLD AUTO: 2 %
ERYTHROCYTE [DISTWIDTH] IN BLOOD BY AUTOMATED COUNT: 12.4 % (ref 10–15)
ERYTHROCYTE [SEDIMENTATION RATE] IN BLOOD BY WESTERGREN METHOD: 8 MM/HR (ref 0–15)
GLUCOSE SERPL-MCNC: 97 MG/DL (ref 70–99)
HCO3 SERPL-SCNC: 24 MMOL/L (ref 22–29)
HCT VFR BLD AUTO: 48.4 % (ref 40–53)
HGB BLD-MCNC: 16.9 G/DL (ref 13.3–17.7)
IMM GRANULOCYTES # BLD: 0 10E3/UL
IMM GRANULOCYTES NFR BLD: 0 %
LYMPHOCYTES # BLD AUTO: 4.5 10E3/UL (ref 0.8–5.3)
LYMPHOCYTES NFR BLD AUTO: 41 %
MCH RBC QN AUTO: 30.5 PG (ref 26.5–33)
MCHC RBC AUTO-ENTMCNC: 34.9 G/DL (ref 31.5–36.5)
MCV RBC AUTO: 87 FL (ref 78–100)
MONOCYTES # BLD AUTO: 0.8 10E3/UL (ref 0–1.3)
MONOCYTES NFR BLD AUTO: 7 %
NEUTROPHILS # BLD AUTO: 5.5 10E3/UL (ref 1.6–8.3)
NEUTROPHILS NFR BLD AUTO: 50 %
NRBC # BLD AUTO: 0 10E3/UL
NRBC BLD AUTO-RTO: 0 /100
PLATELET # BLD AUTO: 278 10E3/UL (ref 150–450)
POTASSIUM SERPL-SCNC: 3.8 MMOL/L (ref 3.4–5.3)
PROT SERPL-MCNC: 7.8 G/DL (ref 6.4–8.3)
RBC # BLD AUTO: 5.55 10E6/UL (ref 4.4–5.9)
SODIUM SERPL-SCNC: 137 MMOL/L (ref 135–145)
WBC # BLD AUTO: 11 10E3/UL (ref 4–11)

## 2025-05-22 PROCEDURE — 82310 ASSAY OF CALCIUM: CPT | Performed by: INTERNAL MEDICINE

## 2025-05-22 PROCEDURE — 85652 RBC SED RATE AUTOMATED: CPT | Performed by: INTERNAL MEDICINE

## 2025-05-22 PROCEDURE — 86140 C-REACTIVE PROTEIN: CPT | Performed by: INTERNAL MEDICINE

## 2025-05-22 PROCEDURE — 82248 BILIRUBIN DIRECT: CPT | Performed by: INTERNAL MEDICINE

## 2025-05-22 PROCEDURE — 85014 HEMATOCRIT: CPT | Performed by: INTERNAL MEDICINE

## 2025-05-22 NOTE — PROGRESS NOTES
Nursing Visit Note:  Nurse visit today for Remicade, labs for Jae Sparks.     present during visit today: Not Applicable.    Intravenous Access:  Labs drawn without difficulty. and Peripheral IV placed.    Infusion Nursing Note:    Pre-infusion Checklist:   Have you had any delayed reaction since last infusion?   No     Have you recently had an elevated temperature, fever, chills productive cough, coughing for 3 weeks or longer or hemoptysis, abnormal vital signs, night sweats, chest pain, or have you noticed a decrease in your appetite, or noted unexplained weight loss or fatigue?   No     Do you have any open wounds or new incisions?  No     Do you have any recent or upcoming hospitalizations, surgeries, or dental procedures? Does not include esophagogastroduodenoscopy, colonoscopy, endoscopic retrograde cholangiopancreatography (ERCP), endoscopic ultrasound (EUS), dental procedures or joint aspiration/steroid injections.   No     Do you currently have or recently have had any signs of illness or infection or are you on any antibiotics?   No     Have you had any new, sudden, or worsening abdominal pain?   No     Have you or anyone in your household received a live vaccination in the past 4 weeks?   No     Have you recently been diagnosed with any new nervous system diseases or cancer diagnosis? (i.e., Multiple Sclerosis, Guillain [place], seizures, neurological changes) Are you receiving any form of radiation or chemotherapy?   No     Are you pregnant or breastfeeding, or do you have plans of pregnancy in the future?   No     Have you been having any signs of worsening depression or suicidal ideation?  No     Have you had any other new onset medical symptoms?  No    Entyvio/Ocrevus/Tyasabri only: Have you been having any new or worsening medical problems such as issues with thinking, eyesight, balance or strength that have persisted over several days?   N/A    Benlysta only: Have you been having any  "signs of worsening depression or suicidal ideations?    N/A    IVIG only: Have you had any new blood clots?  N/A    Did the patient answer \"YES\" to any of the questions above?  No     Will the patient receive a medication that has an order for infusion reaction management?  Yes, and all drugs and supplies are available and none have .     If ordered, has the patient taken pre-medications?  Yes    Plan:   Therapy is appropriate, will proceed with treatment.     Post Infusion Assessment:  Patient tolerated infusion without incident.  Blood return noted pre and post infusion.  Site patent and intact, free from redness, edema or discomfort.  No evidence of extravasations.  Access discontinued per protocol.  Biologic Infusion Post Education: Call the triage nurse at your clinic or seek medical attention if you have chills and/or temperature greater than or equal to 100.5, uncontrolled nausea/vomiting, diarrhea, constipation, dizziness, shortness of breath, chest pain, heart palpitations, weakness or any other new or concerning symptoms, questions or concerns.  You cannot have any live virus vaccines prior to or during treatment or up to 6 months post infusion.  If you have an upcoming surgery, medical procedure or dental procedure during treatment, this should be discussed with your ordering physician and your surgeon/dentist.  If you are having any concerning symptom, if you are unsure if you should get your next infusion or wish to speak to a provider before your next infusion, please call your care coordinator or triage nurse at your clinic to notify them so we can adequately serve you.    and Lab-Only Nursing Note    Labs obtained via VPT    Time Specimen drawn: 1815    Last dose (if applicable): No    Facility sent to: [facility] Tracking number: [id number]    Note: Jae is alert and oriented, in NAD. Assessment is negative, feeling well, no new concerns or complaints. Tolerated infusion well. Took " Claritin at around 1750.     Saline administered: 40 (ml)    Supply Check:   Does the patient have all the supplies they need for the next visit?  Yes    Next visit plan: June 23    Rosana Jamil RN 5/22/2025

## 2025-05-28 ENCOUNTER — RESULTS FOLLOW-UP (OUTPATIENT)
Dept: GASTROENTEROLOGY | Facility: CLINIC | Age: 25
End: 2025-05-28

## 2025-06-11 ENCOUNTER — HOME INFUSION (OUTPATIENT)
Dept: HOME HEALTH SERVICES | Facility: HOME HEALTH | Age: 25
End: 2025-06-11
Payer: COMMERCIAL

## 2025-06-12 ENCOUNTER — HOME INFUSION BILLING (OUTPATIENT)
Dept: HOME HEALTH SERVICES | Facility: HOME HEALTH | Age: 25
End: 2025-06-12
Payer: COMMERCIAL

## 2025-06-12 PROCEDURE — A4215 STERILE NEEDLE: HCPCS

## 2025-06-16 ENCOUNTER — HOME CARE VISIT (OUTPATIENT)
Dept: HOME HEALTH SERVICES | Facility: HOME HEALTH | Age: 25
End: 2025-06-16
Payer: COMMERCIAL

## 2025-06-16 VITALS
RESPIRATION RATE: 18 BRPM | TEMPERATURE: 97.5 F | OXYGEN SATURATION: 99 % | DIASTOLIC BLOOD PRESSURE: 77 MMHG | SYSTOLIC BLOOD PRESSURE: 135 MMHG | HEART RATE: 71 BPM

## 2025-06-16 PROCEDURE — S9359 HIT ANTI-TNF PER DIEM: HCPCS

## 2025-06-16 PROCEDURE — 99602 HOME NFS VISIT EACH ADDL HR: CPT

## 2025-06-16 PROCEDURE — 99601 HOME NFS VISIT <2 HRS: CPT

## 2025-06-16 NOTE — PROGRESS NOTES
Nursing Visit Note:  Nurse visit today for Infliximab infusion  for Jae Sparks.     present during visit today: Not Applicable.    Intravenous Access:  Peripheral IV placed.    Infusion Nursing Note:    Pre-infusion Checklist:   Have you had any delayed reaction since last infusion?   No     Have you recently had an elevated temperature, fever, chills productive cough, coughing for 3 weeks or longer or hemoptysis, abnormal vital signs, night sweats, chest pain, or have you noticed a decrease in your appetite, or noted unexplained weight loss or fatigue?   No     Do you have any open wounds or new incisions?  No     Do you have any recent or upcoming hospitalizations, surgeries, or dental procedures? Does not include esophagogastroduodenoscopy, colonoscopy, endoscopic retrograde cholangiopancreatography (ERCP), endoscopic ultrasound (EUS), dental procedures or joint aspiration/steroid injections.   No     Do you currently have or recently have had any signs of illness or infection or are you on any antibiotics?   No     Have you had any new, sudden, or worsening abdominal pain?   No     Have you or anyone in your household received a live vaccination in the past 4 weeks?   No     Have you recently been diagnosed with any new nervous system diseases or cancer diagnosis? (i.e., Multiple Sclerosis, Guillain [place], seizures, neurological changes) Are you receiving any form of radiation or chemotherapy?   No     Are you pregnant or breastfeeding, or do you have plans of pregnancy in the future?   No     Have you been having any signs of worsening depression or suicidal ideation?  No     Have you had any other new onset medical symptoms?  No    Entyvio/Ocrevus/Tyasabri only: Have you been having any new or worsening medical problems such as issues with thinking, eyesight, balance or strength that have persisted over several days?   N/A    Benlysta only: Have you been having any signs of worsening  "depression or suicidal ideations?    N/A    IVIG only: Have you had any new blood clots?  No    Did the patient answer \"YES\" to any of the questions above?  No     Will the patient receive a medication that has an order for infusion reaction management?  Yes, and all drugs and supplies are available and none have .     If ordered, has the patient taken pre-medications?  Yes    Plan:   Therapy is appropriate, will proceed with treatment.     Post Infusion Assessment:  Patient tolerated infusion without incident.  Site patent and intact, free from redness, edema or discomfort.  No evidence of extravasations.  Access discontinued per protocol.  Biologic Infusion Post Education: Call the triage nurse at your clinic or seek medical attention if you have chills and/or temperature greater than or equal to 100.5, uncontrolled nausea/vomiting, diarrhea, constipation, dizziness, shortness of breath, chest pain, heart palpitations, weakness or any other new or concerning symptoms, questions or concerns.  You cannot have any live virus vaccines prior to or during treatment or up to 6 months post infusion.  If you have an upcoming surgery, medical procedure or dental procedure during treatment, this should be discussed with your ordering physician and your surgeon/dentist.  If you are having any concerning symptom, if you are unsure if you should get your next infusion or wish to speak to a provider before your next infusion, please call your care coordinator or triage nurse at your clinic to notify them so we can adequately serve you.     Note: patient alert and oriented in good spirits. VSS. no c/o pain or any new or worsening sx/symptoms. PIV started and Infliximab infused without adverse reaction. no other questions or concerns.     Saline administered: 40 (ml)    Supply Check:   Does the patient have all the supplies they need for the next visit?  Yes    Next visit plan: 25     Nataliya Scott RN 2025  "

## 2025-06-18 ENCOUNTER — HOME INFUSION (OUTPATIENT)
Dept: HOME HEALTH SERVICES | Facility: HOME HEALTH | Age: 25
End: 2025-06-18

## 2025-06-19 PROCEDURE — A4217 STERILE WATER/SALINE, 500 ML: HCPCS | Mod: JZ

## 2025-07-16 ENCOUNTER — HOME CARE VISIT (OUTPATIENT)
Dept: HOME HEALTH SERVICES | Facility: HOME HEALTH | Age: 25
End: 2025-07-16
Payer: COMMERCIAL

## 2025-07-16 ENCOUNTER — HOME INFUSION BILLING (OUTPATIENT)
Dept: HOME HEALTH SERVICES | Facility: HOME HEALTH | Age: 25
End: 2025-07-16
Payer: COMMERCIAL

## 2025-07-16 ENCOUNTER — LAB REQUISITION (OUTPATIENT)
Dept: LAB | Facility: CLINIC | Age: 25
End: 2025-07-16
Payer: COMMERCIAL

## 2025-07-16 VITALS
HEART RATE: 58 BPM | BODY MASS INDEX: 29.05 KG/M2 | OXYGEN SATURATION: 98 % | TEMPERATURE: 97.3 F | DIASTOLIC BLOOD PRESSURE: 92 MMHG | RESPIRATION RATE: 16 BRPM | SYSTOLIC BLOOD PRESSURE: 130 MMHG | WEIGHT: 180 LBS

## 2025-07-16 DIAGNOSIS — K51.00 ULCERATIVE (CHRONIC) PANCOLITIS WITHOUT COMPLICATIONS (H): ICD-10-CM

## 2025-07-16 LAB
ALBUMIN SERPL BCG-MCNC: 4.8 G/DL (ref 3.5–5.2)
ALP SERPL-CCNC: 73 U/L (ref 40–150)
ALT SERPL W P-5'-P-CCNC: 24 U/L (ref 0–70)
ANION GAP SERPL CALCULATED.3IONS-SCNC: 14 MMOL/L (ref 7–15)
AST SERPL W P-5'-P-CCNC: 22 U/L (ref 0–45)
BASOPHILS # BLD AUTO: 0.1 10E3/UL (ref 0–0.2)
BASOPHILS NFR BLD AUTO: 1 %
BILIRUB DIRECT SERPL-MCNC: 0.16 MG/DL (ref 0–0.3)
BILIRUB SERPL-MCNC: 0.5 MG/DL
BUN SERPL-MCNC: 8.8 MG/DL (ref 6–20)
CALCIUM SERPL-MCNC: 9.8 MG/DL (ref 8.8–10.4)
CHLORIDE SERPL-SCNC: 99 MMOL/L (ref 98–107)
CREAT SERPL-MCNC: 0.98 MG/DL (ref 0.67–1.17)
CRP SERPL-MCNC: <3 MG/L
EGFRCR SERPLBLD CKD-EPI 2021: >90 ML/MIN/1.73M2
EOSINOPHIL # BLD AUTO: 0.2 10E3/UL (ref 0–0.7)
EOSINOPHIL NFR BLD AUTO: 2 %
ERYTHROCYTE [DISTWIDTH] IN BLOOD BY AUTOMATED COUNT: 12.4 % (ref 10–15)
ERYTHROCYTE [SEDIMENTATION RATE] IN BLOOD BY WESTERGREN METHOD: 1 MM/HR (ref 0–15)
GLUCOSE SERPL-MCNC: 83 MG/DL (ref 70–99)
HCO3 SERPL-SCNC: 26 MMOL/L (ref 22–29)
HCT VFR BLD AUTO: 51.4 % (ref 40–53)
HGB BLD-MCNC: 17.8 G/DL (ref 13.3–17.7)
IMM GRANULOCYTES # BLD: 0 10E3/UL
IMM GRANULOCYTES NFR BLD: 0 %
LYMPHOCYTES # BLD AUTO: 4.2 10E3/UL (ref 0.8–5.3)
LYMPHOCYTES NFR BLD AUTO: 42 %
MCH RBC QN AUTO: 30.6 PG (ref 26.5–33)
MCHC RBC AUTO-ENTMCNC: 34.6 G/DL (ref 31.5–36.5)
MCV RBC AUTO: 89 FL (ref 78–100)
MONOCYTES # BLD AUTO: 0.8 10E3/UL (ref 0–1.3)
MONOCYTES NFR BLD AUTO: 8 %
NEUTROPHILS # BLD AUTO: 4.8 10E3/UL (ref 1.6–8.3)
NEUTROPHILS NFR BLD AUTO: 48 %
NRBC # BLD AUTO: 0 10E3/UL
NRBC BLD AUTO-RTO: 0 /100
PLATELET # BLD AUTO: 286 10E3/UL (ref 150–450)
POTASSIUM SERPL-SCNC: 4 MMOL/L (ref 3.4–5.3)
PROT SERPL-MCNC: 8.3 G/DL (ref 6.4–8.3)
RBC # BLD AUTO: 5.81 10E6/UL (ref 4.4–5.9)
SODIUM SERPL-SCNC: 139 MMOL/L (ref 135–145)
WBC # BLD AUTO: 10 10E3/UL (ref 4–11)

## 2025-07-16 PROCEDURE — 85004 AUTOMATED DIFF WBC COUNT: CPT | Performed by: INTERNAL MEDICINE

## 2025-07-16 PROCEDURE — S9359 HIT ANTI-TNF PER DIEM: HCPCS

## 2025-07-16 PROCEDURE — A4215 STERILE NEEDLE: HCPCS

## 2025-07-16 PROCEDURE — 80048 BASIC METABOLIC PNL TOTAL CA: CPT | Performed by: INTERNAL MEDICINE

## 2025-07-16 PROCEDURE — 85652 RBC SED RATE AUTOMATED: CPT | Performed by: INTERNAL MEDICINE

## 2025-07-16 PROCEDURE — A4217 STERILE WATER/SALINE, 500 ML: HCPCS | Mod: JZ

## 2025-07-16 PROCEDURE — 84155 ASSAY OF PROTEIN SERUM: CPT | Performed by: INTERNAL MEDICINE

## 2025-07-16 PROCEDURE — S1015 IV TUBING EXTENSION SET: HCPCS

## 2025-07-16 PROCEDURE — A4213 20+ CC SYRINGE ONLY: HCPCS

## 2025-07-16 PROCEDURE — 86140 C-REACTIVE PROTEIN: CPT | Performed by: INTERNAL MEDICINE

## 2025-07-16 NOTE — PROGRESS NOTES
Nursing Visit Note:  Nurse visit today for Remicade for Jae Sparks.     present during visit today: Not Applicable.    Intravenous Access:  Labs drawn without difficulty. and Peripheral IV placed.    Infusion Nursing Note:    Pre-infusion Checklist:   Have you had any delayed reaction since last infusion?   No     Have you recently had an elevated temperature, fever, chills productive cough, coughing for 3 weeks or longer or hemoptysis, abnormal vital signs, night sweats, chest pain, or have you noticed a decrease in your appetite, or noted unexplained weight loss or fatigue?   No     Do you have any open wounds or new incisions?  No     Do you have any recent or upcoming hospitalizations, surgeries, or dental procedures? Does not include esophagogastroduodenoscopy, colonoscopy, endoscopic retrograde cholangiopancreatography (ERCP), endoscopic ultrasound (EUS), dental procedures or joint aspiration/steroid injections.   No     Do you currently have or recently have had any signs of illness or infection or are you on any antibiotics?   No     Have you had any new, sudden, or worsening abdominal pain?   No     Have you or anyone in your household received a live vaccination in the past 4 weeks?   No     Have you recently been diagnosed with any new nervous system diseases or cancer diagnosis? (i.e., Multiple Sclerosis, Guillain [place], seizures, neurological changes) Are you receiving any form of radiation or chemotherapy?   No     Are you pregnant or breastfeeding, or do you have plans of pregnancy in the future?   No     Have you been having any signs of worsening depression or suicidal ideation?  No     Have you had any other new onset medical symptoms?  No    Entyvio/Ocrevus/Tyasabri only: Have you been having any new or worsening medical problems such as issues with thinking, eyesight, balance or strength that have persisted over several days?   N/A    Benlysta only: Have you been having any signs  "of worsening depression or suicidal ideations?    N/A    IVIG only: Have you had any new blood clots?  N/A    Did the patient answer \"YES\" to any of the questions above?  No     Will the patient receive a medication that has an order for infusion reaction management?  Yes, and all drugs and supplies are available and none have .     If ordered, has the patient taken pre-medications?  N/A    Plan:   Therapy is appropriate, will proceed with treatment.     Post Infusion Assessment:  Patient tolerated infusion without incident.  Blood return noted pre and post infusion.  Site patent and intact, free from redness, edema or discomfort.  No evidence of extravasations.  Access discontinued per protocol.  Biologic Infusion Post Education: Call the triage nurse at your clinic or seek medical attention if you have chills and/or temperature greater than or equal to 100.5, uncontrolled nausea/vomiting, diarrhea, constipation, dizziness, shortness of breath, chest pain, heart palpitations, weakness or any other new or concerning symptoms, questions or concerns.  You cannot have any live virus vaccines prior to or during treatment or up to 6 months post infusion.  If you have an upcoming surgery, medical procedure or dental procedure during treatment, this should be discussed with your ordering physician and your surgeon/dentist.  If you are having any concerning symptom, if you are unsure if you should get your next infusion or wish to speak to a provider before your next infusion, please call your care coordinator or triage nurse at your clinic to notify them so we can adequately serve you.    and Lab-Only Nursing Note    Labs obtained via VPT    Time Specimen drawn: 1645    Last dose (if applicable): No    Facility sent to: [facility] Tracking number: [id number]    Note: Jae is alert and oriented, in NAD. Assessment is negative, feeling well. Tolerated infusion without incident.     Saline administered: 40 " (ml)    Supply Check:   Does the patient have all the supplies they need for the next visit?  No, Order placed for hand      Next visit plan: Aug 13    Rosana Jamil RN 7/16/2025

## 2025-07-19 ENCOUNTER — HEALTH MAINTENANCE LETTER (OUTPATIENT)
Age: 25
End: 2025-07-19

## 2025-07-30 ENCOUNTER — VIRTUAL VISIT (OUTPATIENT)
Dept: GASTROENTEROLOGY | Facility: CLINIC | Age: 25
End: 2025-07-30
Attending: PHYSICIAN ASSISTANT
Payer: COMMERCIAL

## 2025-07-30 DIAGNOSIS — K51.00 ULCERATIVE PANCOLITIS WITHOUT COMPLICATION (H): Primary | ICD-10-CM

## 2025-07-30 PROCEDURE — 1126F AMNT PAIN NOTED NONE PRSNT: CPT | Mod: 95 | Performed by: PHYSICIAN ASSISTANT

## 2025-07-30 PROCEDURE — 98006 SYNCH AUDIO-VIDEO EST MOD 30: CPT | Performed by: PHYSICIAN ASSISTANT

## 2025-07-30 NOTE — NURSING NOTE
Current patient location: Patient declined to provide     Is the patient currently in the state of [place]? YES    Visit mode: VIDEO    If the visit is dropped, the patient can be reconnected by:VIDEO VISIT: Text to cell phone:   Telephone Information:   Mobile [phone]       Will anyone else be joining the visit? NO  (If patient encounters technical issues they should call [phone]    Are changes needed to the allergy or medication list? No    Are refills needed on medications prescribed by this physician? NO    Rooming Documentation:  Questionnaire(s) completed    Reason for visit: RECHECK    [place] TIJERINAF

## 2025-07-30 NOTE — PATIENT INSTRUCTIONS
It was a pleasure taking care of you today.  I've included a brief summary of our discussion and care plan from today's visit below.  Please review this information with your primary care provider.  ______________________________________________________________________    My recommendations are summarized as follows:    -- Continue remicade every 4 weeks  -- Labs every 3 months   -- Next endoscopic assessment: 2027  -- Patient with IBD we recommend supplementation vitamin D 1000 units daily and calcium 500 mg twice daily.  -- Vaccines/immunizations to be updated: shingles vaccine   -- Yearly Dermatology visit for skin check while on immunosuppressive therapy. Can call [phone] to schedule.  -- No NSAIDs (ibuprofen, or anything containing ibuprofen)       For additional resources about inflammatory bowel disease visit http://www.crohnscolitisfoundation.org/     To learn more about Diet and Nutrition in the setting of IBD, check out some of these resources:  https://www.crohnscolitisfoundation.org/diet-and-nutrition/what-should-i-eat  https://ntforibd.org/ (SCD, mSCD, Autoimmune protocol, IBD-AID, CDED diets with inclusion/exclusion charts)  https://[employer].org/ (mediterranean diet)  https://www.[facility].edu/nutrition/ibd/ibdaid/ (IBD-AID)     Return to GI Clinic in 6 months to review your progress.    ______________________________________________________________________    How do I schedule labs, imaging studies, or procedures that were ordered in clinic today?     Labs: To schedule lab appointment at the [facility], use my chart or call [phone]. If you have a [facility] lab closer to home where you are regularly seen you can give them a call.     Procedures: If a colonoscopy, upper endoscopy, breath test, esophageal manometry, or pH impedence was ordered today, our endoscopy team will call you to schedule this. If you have not heard from our endoscopy team within a week, please call  [phone] to schedule.     Imaging Studies: If you were scheduled for a CT scan, X-ray, MRI, ultrasound, HIDA scan or other imaging study, please call [phone] to have this scheduled.     Referral: If a referral to another specialty was ordered, expect a phone call or follow instructions above. If you have not heard from anyone regarding your referral in a week, please call our clinic to check the status.     Who do I call with any questions after my visit?  Please be in touch if there are any further questions that arise following today's visit.  There are multiple ways to contact your gastroenterology care team.      During business hours, you may reach a Gastroenterology nurse at [phone]    To schedule or reschedule an appointment, please call [phone].     You can always send a secure message through 2houses.  2houses messages are answered by your nurse or doctor typically within 24 hours.  Please allow extra time on weekends and holidays.      For urgent/emergent questions after business hours, you may reach the on-call GI Fellow by contacting the [facility]  at [phone].     How will I get the results of any tests ordered?    You will receive all of your results.  If you have signed up for [facility], any tests ordered at your visit will be available to you after your physician reviews them.  Typically this takes 1-2 weeks.  If there are urgent results that require a change in your care plan, your physician or nurse will call you to discuss the next steps.      What is 2houses?  2houses is a secure way for you to access all of your healthcare records from the [facility].  It is a web based computer program, so you can sign on to it from any location.  It also allows you to send secure messages to your care team.  I recommend signing up for 2houses access if you have not already done so and are comfortable with using a computer.         Sincerely,    Andrew  Maggy PAIGE  [facility]  Division of Gastroenterology

## 2025-07-30 NOTE — LETTER
7/30/2025      Jae Sparks  [address]  [place] [address]      Dear Colleague,    Thank you for referring your patient, Jae Sparks, to the [facility] [place]. Please see a copy of my visit note below.    Virtual Visit Details    Type of service:  Video Visit     Originating Location (pt. Location): Home    Distant Location (provider location):  Off-site  Platform used for Video Visit: [facility] UC follow up       PATIENT: Jae Sparks    MRN: [id number]    Date of Birth 2000    Tel: [phone] (home)     PCP: No primary care provider on file.     HPI: Mr. pSarks is a 23 year old year old male here for follow up of ulcerative pancolitis.     UC history    Jae was diagnosed with pan-UC in late 2017. He was started on prednisone and Humira 8/2017. He felt somewhat better on Humira but lost response about a week with continued symptoms. Trough level in February 2018 -undetectable Humira level without antibodies and dose changed to weekly 2/2018.     Could not continue with self injections of Humira, so switched to Inflectra May 2020 (Dr. Landeros,  in [place]). Dose increased to 7.5 mg/kg q8 w when level was low (4.58, 0 ATIs) and FC was elevated at 441.     Last inflectra dose was 8 weeks ago.      Macroscopic extent of disease (most recent) E3    Current UC symptoms    Bowel frequency in day 3 (in the morning)   Bowel frequency in night 0  Urgency of defecation occasional  Blood in stool none  General well being 0 = very well  Extracolonic features (multiple select) none     Constitutional symptoms:  Fever NO  Weight loss NO    Noteworthy diet history- no dairy, low fiber     Other GI symptoms present none    Total number of IBD surgeries (except perianal): 0    Current IBD Medications:  Inflectra    Past IBD Medications:   Prednisone  Humira    Interval history, 12/2020  Doing well. Next inflectra on 1/18/2021. No  breakthrough symptoms.     Current UC symptoms    Bowel frequency in day 2-3   Bowel frequency in night 0  Urgency of defecation occasional  Blood in stool none  General well being 0 = very well  Extracolonic features (multiple select) none     Interval history, 11/2021  A couple days prior to inflectra may feel some urgency.     Current UC symptoms  Bowel frequency in day 1-3   Bowel frequency in night 0  Urgency of defecation occasional  Blood in stool none  General well being 0 = very well  Extracolonic features (multiple select) none    Interval history, 10/2022  IFX level was only 0.7 in 12/2021 and his IFX was increased to 10 mg/kg q4w.     Current UC symptoms  Bowel frequency in day 1-2  Bowel frequency in night 0  Urgency of defecation no  Blood in stool none  General well being 0 = very well  Extracolonic features (multiple select) none    Interval history, 4/12/23  1-2 stools per day. Formed, no blood. No urgency.      Current UC symptoms  Bowel frequency in day 1-2  Bowel frequency in night 0  Urgency of defecation no  Blood in stool none  General well being 0 = very well  Extracolonic features (multiple select) none    Interval history, 10/24/23  1-2 (occasionally 3) stools per day. Formed, no blood. No urgency.  No skin or joint problems. Twitch in right eye.  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     Current UC symptoms  Bowel frequency in day 1-2  Bowel frequency in night 0  Urgency of defecation no  Blood in stool none  General well being 0 = very well  Extracolonic features (multiple select) none    Interval hx 7/30/25  1-2 (occasionally 3) stools per day. Formed, no blood. Some morning urgency, but this has been a lot less.  Slightly dry skin otherwise, no EIM.     Current UC symptoms  Bowel frequency in day 1-2  Bowel frequency in night 0  Urgency of defecation no  Blood in stool none  General well being 0 = very well  Extracolonic features (multiple select) none    Past Medical  "History:   Diagnosis Date     Uncomplicated asthma         Past Surgical History:   Procedure Laterality Date     COLONOSCOPY N/A 5/5/2025    Procedure: COLONOSCOPY, WITH POLYPECTOMY AND BIOPSIES;  Surgeon: Jigna Eng MD;  Location: [facility] OR     NO HISTORY OF SURGERY         Social History     Tobacco Use     Smoking status: Never     Smokeless tobacco: Never   Substance Use Topics     Alcohol use: Not Currently       Family History   Problem Relation Age of Onset     Inflammatory Bowel Disease No family hx of      Autoimmune Disease No family hx of      Colon Cancer No family hx of        Allergies   Allergen Reactions     Pollen Extract Itching     Cantaloupe (Diagnostic) Hives and Itching     Cats         Outpatient Encounter Medications as of 7/30/2025   Medication Sig Dispense Refill     albuterol (PROAIR HFA/PROVENTIL HFA/VENTOLIN HFA) 108 (90 Base) MCG/ACT inhaler Inhale 1-2 puffs into the lungs every 4 hours as needed 18 g 1     diphenhydrAMINE (BENADRYL) 50 MG/ML injection Inject 1 mL (50 mg) over 3-5 minutes into the vein via push as needed for other (infusion reaction). For RN use only.  Draw up in a syringe and administer IV push.  Discard remainder of vial. 85580 mL 0     Emergency Supply Kit, PIV, Patient use for emergency only. Contents: 3 sodium chloride 0.9% flushes, 1 IV start kit, 1 microclave ext set 14\", 1 each IV Cath 22 G/1\" and 24G/3/4\", 6 alcohol prep pads, 4 nitrile gloves (med). Call [phone] to reorder. 206790 kit 0     EPINEPHrine (ANY BX GENERIC EQUIV) 0.3 MG/0.3ML injection 2-pack Inject 0.3 mLs (0.3 mg) into the muscle as needed for anaphylaxis (infusion reaction). Administer into the mid-thigh in case of severe anaphylaxis (wheezing, throat tightening, mouth swelling, difficulty breathing). May repeat dose one time in 5-15 minutes if symptoms persist. [id number] mL 0     fexofenadine (ALLEGRA) 180 MG tablet Take 180 mg by mouth daily as needed for allergies (in spring and " summer)       fluticasone furoate 27.5 MCG/SPRAY nasal spray Spray 2 sprays into both nostrils daily as needed for rhinitis or allergies (seasonally)       inFLIXimab (REMICADE) 100 MG injection Add to infusion 80 mLs (800 mg) every 4 weeks. Reconstitute infliximab vial(s).  Draw up infliximab 10 mg/mL in syringe with 21 G needle and add to NaCl 0.9% bag immediately prior to infusing.  MIX GENTLY BY INVERSION, DO NOT SHAKE. Discard remainder of vial(s). 414177 each 0     inFLIXimab, REMICADE, 100 MG injection Inject 798 mg into the vein every 28 days.       inFLIXimab-dyyb (INFLECTRA IV)        loratadine (CLARITIN) 10 MG tablet Take 10 mg by mouth daily.       methylPREDNISolone Na Suc, PF, (SOLU-MEDROL) 125 mg/2 mL injection Inject 2 mLs (125 mg) over 3-5 minutes into the vein via push as needed (infusion reaction). For RN use only.  Reconstitute vial. Draw up methylPREDNISolone in a syringe and administer.  Discard remainder of vial. 382212 mL 0     omeprazole (PRILOSEC) 40 MG DR capsule Take 40 mg by mouth daily as needed        sodium chloride 0.9% bag Infuse 250 mLs over 1 hours into the vein every 4 weeks. Add 80 mL (800 mg) of infliximab 10 mg/mL to bag immediately prior to infusing via gravity infusion. When complete, flush bag with 20 mL NS to flush tubing. 285200 mL 0     sodium chloride 0.9% infusion Infuse 500 mLs into the vein as needed for other (infusion reaction). In case of mild reaction, administer via gravity at 20 mL/hr to keep vein open. In case of severe reaction, administer via gravity wide open on prime setting. 143084 mL 0     sodium chloride, PF, 0.9% PF flush Inject 10 mLs into the vein as needed for other (infusion reaction). For RN use only as needed for infusion reaction [id number] mL 0     sodium chloride, PF, 0.9% PF flush Inject 10 mLs into the vein as needed for line flush. Flush IV before and after medication administration as directed and/or at least every 12 hours. 121407 mL 0      sterile water, preservative free, injection Use 80 mLs for reconstitution every 4 weeks. 1. Reconstitute each vial of infliximab with 10 mL of sterile water for injection by slowly injecting 10 mL sterile water down the inside wall of vial w/21 G needle. DO NOT SHAKE. Foaming of the solution on reconstitution is not unusual. Roll and tilt each vial gently.  2. Let drug stand for 5 minutes. 3. Inspect vials for particules and/or discoloration prior to continuing. The solution should be colorless to light yellow and opalescent, and may develop a few translucent particles. DO NOT USE IF DRUG HAS NOT FULLY DISSOLVED OR IF OPAQUE PARTICLES, DISCOLORATION OR OTHER FOREIGN PARTICULES ARE PRESENT. 4. Draw up appropriate dose w/ 21 G needle from vial. 671241 mL 0     Vitamin D3 (CHOLECALCIFEROL) 25 mcg (1000 units) tablet Take 1 tablet by mouth daily.       No facility-administered encounter medications on file as of 7/30/2025.     NSAID  NO    Review of Systems  Complete 10 System ROS performed. All are negative except as documented below, in the HPI, or in patient questionnaire from today's visit.    1) Constitutional: No fevers, chills, night sweats or malaise, weight loss or gain  2) Skin: No rash  3) Pulmonary: No wheeze, SOB, cough, sputum or hemoptysis  4) Cardiovascular: No Chest pain or palpitations  5) Genitourinary: No blood in urine or dysuria  6) Endocrine: No increased sweating, hunger, thirst or thyroid problems  7) Hematologic: No bruising and easy bleeding  8) Musculoskeletal: no new pain in joints or limitation in ROM  9) Neurologic: No dizziness, paresthesias or weakness or falls  10) Psychiatric:  not depressed/anxious, no sleep problems    PHYSICAL EXAM  Vitals: There were no vitals taken for this visit.    No Pain (0)     Constitutional - general appearance is well and in no acute distress. Body habitus normal  Eyes - No redness or discharge  Respiratory - No cough, unlabored breathing  Musculoskeletal  - range of motion intact: Neck and arms  Skin - No discoloration or lesions  Neurological - No tremors, headaches  Psychiatric - No anxiety, alert & oriented    DATA:  Reviewed in detail past documentation, medications and prior workup available in electronic health records or through outside records.    PERTINENT STUDIES:  Tuberculosis: QFN neg 12/2019  HBV serology neg in 1/2018    DRUG MONITORING  Biologic concentration: IFX trough 4.58 (ATIs were not tested, given presence of drug level). 12/201: IFX trough was only 0.7, 0 ATIs. Increased to 10 mg/kg every 4 weeks.     5/16/2016 Flex sig: Signs of colonic inflammation identified in the left colon predominantly, likely beyond  Bx - mild active colitis, favored to be a resolving or self limited infectious colitis    7/10/2017 Flex sig: Moderate colitis from rectum to 40cm consistent with ulcerative colitis.  Bx - chronic colitis, moderately active.  Treated with lialda, an ti-TNF agent discussed but not started.    8/2/2017 CT Abdomen/Pelvis: ? Acute pyelonephritis, diffuse colon wall thickening.    8/4/2017 Sigmoidoscopy: Punched out deep ulceration/edema/erythema up to 70 cm. Biopsies take from mid-descending, distal descending, proximal sigmoid, distal sigmoid, and rectum.    6/7/2018 Colonoscopy for IBD disease assessment:  Scarring seen throughout the colon c/w healed ulcerative colitis. Scattered pseudopolyps seen throughout the colon, most marked in the ascending colon and cecum. Mild pinpoint erosions were seen in the terminal ileum. Random biopsies were taken throughout.   Endoscopic examination was performed to the Terminal ileum. This was for assessment of ulcerative colitis disease activity.  The Lee score for each colonic segment is below:  Rectum: 0 (normal) - normal path  Sigmoid colon: 0 (normal) - normal path  Descending colon: 0 (normal) - normal path  Transverse colon: 0 (normal) - normal path  Ascending colon: 0 (normal) - focal active  colitis  Cecum: 0 (normal) - normal path  Terminal ileum: Mild pinpoint erosions. - focal active ileitis - ?medication or prep effect  Based on your overall colonoscopy findings, you have colitis that is Completely healed (Lee 0), though there is possible mild terminal ileitis.     IMPRESSION:    DIAGNOSIS:  # E3 UC in clinical remission on inflectra 10 mg/kg q4w in deep remission  # IBD Health Care Maintenance  Mr. Sparks is a 25 year old here with E3 UC in deep remission (icscope in 2018 showed Lee 0) on Inflectra 10 mg/kg q4w. Normal fecal yun 10/2022. We will plan for the following:    PLAN:  ---Continue IFX 10 mg/kg every 4 weeks  ---Dermatology for FBSE   ---Colon cancer screening to start in 2027, (MAC sedation).    Misc:  -- Avoid tobacco use  -- Avoid NSAIDs as there is potentially a 25% chance of causing an IBD flare  -- Vit D Supplementation      Andrew Winter PA-C  Division of Gastroenterology, Hepatology and Nutrition  [facility]                           Again, thank you for allowing me to participate in the care of your patient.        Sincerely,        Andrew Winter PA-C    Electronically signed

## 2025-07-30 NOTE — PROGRESS NOTES
Virtual Visit Details    Type of service:  Video Visit     Originating Location (pt. Location): Home    Distant Location (provider location):  Off-site  Platform used for Video Visit: [facility] UC follow up       PATIENT: Jae Sparks    MRN: [id number]    Date of Birth 2000    Tel: [phone] (home)     PCP: No primary care provider on file.     HPI: Mr. Sparks is a 23 year old year old male here for follow up of ulcerative pancolitis.     UC history    Jae was diagnosed with pan-UC in late 2017. He was started on prednisone and Humira 8/2017. He felt somewhat better on Humira but lost response about a week with continued symptoms. Trough level in February 2018 -undetectable Humira level without antibodies and dose changed to weekly 2/2018.     Could not continue with self injections of Humira, so switched to Inflectra May 2020 (Dr. Landeros,  in [place]). Dose increased to 7.5 mg/kg q8 w when level was low (4.58, 0 ATIs) and FC was elevated at 441.     Last inflectra dose was 8 weeks ago.      Macroscopic extent of disease (most recent) E3    Current UC symptoms    Bowel frequency in day 3 (in the morning)   Bowel frequency in night 0  Urgency of defecation occasional  Blood in stool none  General well being 0 = very well  Extracolonic features (multiple select) none     Constitutional symptoms:  Fever NO  Weight loss NO    Noteworthy diet history- no dairy, low fiber     Other GI symptoms present none    Total number of IBD surgeries (except perianal): 0    Current IBD Medications:  Inflectra    Past IBD Medications:   Prednisone  Humira    Interval history, 12/2020  Doing well. Next inflectra on 1/18/2021. No breakthrough symptoms.     Current UC symptoms    Bowel frequency in day 2-3   Bowel frequency in night 0  Urgency of defecation occasional  Blood in stool none  General well being 0 = very well  Extracolonic features (multiple select) none     Interval history, 11/2021  A  couple days prior to inflectra may feel some urgency.     Current UC symptoms  Bowel frequency in day 1-3   Bowel frequency in night 0  Urgency of defecation occasional  Blood in stool none  General well being 0 = very well  Extracolonic features (multiple select) none    Interval history, 10/2022  IFX level was only 0.7 in 12/2021 and his IFX was increased to 10 mg/kg q4w.     Current UC symptoms  Bowel frequency in day 1-2  Bowel frequency in night 0  Urgency of defecation no  Blood in stool none  General well being 0 = very well  Extracolonic features (multiple select) none    Interval history, 4/12/23  1-2 stools per day. Formed, no blood. No urgency.      Current UC symptoms  Bowel frequency in day 1-2  Bowel frequency in night 0  Urgency of defecation no  Blood in stool none  General well being 0 = very well  Extracolonic features (multiple select) none    Interval history, 10/24/23  1-2 (occasionally 3) stools per day. Formed, no blood. No urgency.  No skin or joint problems. Twitch in right eye.  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     Current UC symptoms  Bowel frequency in day 1-2  Bowel frequency in night 0  Urgency of defecation no  Blood in stool none  General well being 0 = very well  Extracolonic features (multiple select) none    Interval hx 7/30/25  1-2 (occasionally 3) stools per day. Formed, no blood. Some morning urgency, but this has been a lot less.  Slightly dry skin otherwise, no EIM.     Current UC symptoms  Bowel frequency in day 1-2  Bowel frequency in night 0  Urgency of defecation no  Blood in stool none  General well being 0 = very well  Extracolonic features (multiple select) none    Past Medical History:   Diagnosis Date    Uncomplicated asthma         Past Surgical History:   Procedure Laterality Date    COLONOSCOPY N/A 5/5/2025    Procedure: COLONOSCOPY, WITH POLYPECTOMY AND BIOPSIES;  Surgeon: Jigna Eng MD;  Location: [facility] OR    NO HISTORY OF SURGERY    "      Social History     Tobacco Use    Smoking status: Never    Smokeless tobacco: Never   Substance Use Topics    Alcohol use: Not Currently       Family History   Problem Relation Age of Onset    Inflammatory Bowel Disease No family hx of     Autoimmune Disease No family hx of     Colon Cancer No family hx of        Allergies   Allergen Reactions    Pollen Extract Itching    Cantaloupe (Diagnostic) Hives and Itching    Cats         Outpatient Encounter Medications as of 7/30/2025   Medication Sig Dispense Refill    albuterol (PROAIR HFA/PROVENTIL HFA/VENTOLIN HFA) 108 (90 Base) MCG/ACT inhaler Inhale 1-2 puffs into the lungs every 4 hours as needed 18 g 1    diphenhydrAMINE (BENADRYL) 50 MG/ML injection Inject 1 mL (50 mg) over 3-5 minutes into the vein via push as needed for other (infusion reaction). For RN use only.  Draw up in a syringe and administer IV push.  Discard remainder of vial. 60041 mL 0    Emergency Supply Kit, PIV, Patient use for emergency only. Contents: 3 sodium chloride 0.9% flushes, 1 IV start kit, 1 microclave ext set 14\", 1 each IV Cath 22 G/1\" and 24G/3/4\", 6 alcohol prep pads, 4 nitrile gloves (med). Call [phone] to reorder. [id number] kit 0    EPINEPHrine (ANY BX GENERIC EQUIV) 0.3 MG/0.3ML injection 2-pack Inject 0.3 mLs (0.3 mg) into the muscle as needed for anaphylaxis (infusion reaction). Administer into the mid-thigh in case of severe anaphylaxis (wheezing, throat tightening, mouth swelling, difficulty breathing). May repeat dose one time in 5-15 minutes if symptoms persist. 411690 mL 0    fexofenadine (ALLEGRA) 180 MG tablet Take 180 mg by mouth daily as needed for allergies (in spring and summer)      fluticasone furoate 27.5 MCG/SPRAY nasal spray Spray 2 sprays into both nostrils daily as needed for rhinitis or allergies (seasonally)      inFLIXimab (REMICADE) 100 MG injection Add to infusion 80 mLs (800 mg) every 4 weeks. Reconstitute infliximab vial(s).  Draw up infliximab " 10 mg/mL in syringe with 21 G needle and add to NaCl 0.9% bag immediately prior to infusing.  MIX GENTLY BY INVERSION, DO NOT SHAKE. Discard remainder of vial(s). 301622 each 0    inFLIXimab, REMICADE, 100 MG injection Inject 798 mg into the vein every 28 days.      inFLIXimab-dyyb (INFLECTRA IV)       loratadine (CLARITIN) 10 MG tablet Take 10 mg by mouth daily.      methylPREDNISolone Na Suc, PF, (SOLU-MEDROL) 125 mg/2 mL injection Inject 2 mLs (125 mg) over 3-5 minutes into the vein via push as needed (infusion reaction). For RN use only.  Reconstitute vial. Draw up methylPREDNISolone in a syringe and administer.  Discard remainder of vial. 728699 mL 0    omeprazole (PRILOSEC) 40 MG DR capsule Take 40 mg by mouth daily as needed       sodium chloride 0.9% bag Infuse 250 mLs over 1 hours into the vein every 4 weeks. Add 80 mL (800 mg) of infliximab 10 mg/mL to bag immediately prior to infusing via gravity infusion. When complete, flush bag with 20 mL NS to flush tubing. 557855 mL 0    sodium chloride 0.9% infusion Infuse 500 mLs into the vein as needed for other (infusion reaction). In case of mild reaction, administer via gravity at 20 mL/hr to keep vein open. In case of severe reaction, administer via gravity wide open on prime setting. 684295 mL 0    sodium chloride, PF, 0.9% PF flush Inject 10 mLs into the vein as needed for other (infusion reaction). For RN use only as needed for infusion reaction 987457 mL 0    sodium chloride, PF, 0.9% PF flush Inject 10 mLs into the vein as needed for line flush. Flush IV before and after medication administration as directed and/or at least every 12 hours. 645457 mL 0    sterile water, preservative free, injection Use 80 mLs for reconstitution every 4 weeks. 1. Reconstitute each vial of infliximab with 10 mL of sterile water for injection by slowly injecting 10 mL sterile water down the inside wall of vial w/21 G needle. DO NOT SHAKE. Foaming of the solution on  reconstitution is not unusual. Roll and tilt each vial gently.  2. Let drug stand for 5 minutes. 3. Inspect vials for particules and/or discoloration prior to continuing. The solution should be colorless to light yellow and opalescent, and may develop a few translucent particles. DO NOT USE IF DRUG HAS NOT FULLY DISSOLVED OR IF OPAQUE PARTICLES, DISCOLORATION OR OTHER FOREIGN PARTICULES ARE PRESENT. 4. Draw up appropriate dose w/ 21 G needle from vial. 365877 mL 0    Vitamin D3 (CHOLECALCIFEROL) 25 mcg (1000 units) tablet Take 1 tablet by mouth daily.       No facility-administered encounter medications on file as of 7/30/2025.     NSAID  NO    Review of Systems  Complete 10 System ROS performed. All are negative except as documented below, in the HPI, or in patient questionnaire from today's visit.    1) Constitutional: No fevers, chills, night sweats or malaise, weight loss or gain  2) Skin: No rash  3) Pulmonary: No wheeze, SOB, cough, sputum or hemoptysis  4) Cardiovascular: No Chest pain or palpitations  5) Genitourinary: No blood in urine or dysuria  6) Endocrine: No increased sweating, hunger, thirst or thyroid problems  7) Hematologic: No bruising and easy bleeding  8) Musculoskeletal: no new pain in joints or limitation in ROM  9) Neurologic: No dizziness, paresthesias or weakness or falls  10) Psychiatric:  not depressed/anxious, no sleep problems    PHYSICAL EXAM  Vitals: There were no vitals taken for this visit.    No Pain (0)     Constitutional - general appearance is well and in no acute distress. Body habitus normal  Eyes - No redness or discharge  Respiratory - No cough, unlabored breathing  Musculoskeletal - range of motion intact: Neck and arms  Skin - No discoloration or lesions  Neurological - No tremors, headaches  Psychiatric - No anxiety, alert & oriented    DATA:  Reviewed in detail past documentation, medications and prior workup available in electronic health records or through outside  records.    PERTINENT STUDIES:  Tuberculosis: QFN neg 12/2019  HBV serology neg in 1/2018    DRUG MONITORING  Biologic concentration: IFX trough 4.58 (ATIs were not tested, given presence of drug level). 12/201: IFX trough was only 0.7, 0 ATIs. Increased to 10 mg/kg every 4 weeks.     5/16/2016 Flex sig: Signs of colonic inflammation identified in the left colon predominantly, likely beyond  Bx - mild active colitis, favored to be a resolving or self limited infectious colitis    7/10/2017 Flex sig: Moderate colitis from rectum to 40cm consistent with ulcerative colitis.  Bx - chronic colitis, moderately active.  Treated with lialda, an ti-TNF agent discussed but not started.    8/2/2017 CT Abdomen/Pelvis: ? Acute pyelonephritis, diffuse colon wall thickening.    8/4/2017 Sigmoidoscopy: Punched out deep ulceration/edema/erythema up to 70 cm. Biopsies take from mid-descending, distal descending, proximal sigmoid, distal sigmoid, and rectum.    6/7/2018 Colonoscopy for IBD disease assessment:  Scarring seen throughout the colon c/w healed ulcerative colitis. Scattered pseudopolyps seen throughout the colon, most marked in the ascending colon and cecum. Mild pinpoint erosions were seen in the terminal ileum. Random biopsies were taken throughout.   Endoscopic examination was performed to the Terminal ileum. This was for assessment of ulcerative colitis disease activity.  The Lee score for each colonic segment is below:  Rectum: 0 (normal) - normal path  Sigmoid colon: 0 (normal) - normal path  Descending colon: 0 (normal) - normal path  Transverse colon: 0 (normal) - normal path  Ascending colon: 0 (normal) - focal active colitis  Cecum: 0 (normal) - normal path  Terminal ileum: Mild pinpoint erosions. - focal active ileitis - ?medication or prep effect  Based on your overall colonoscopy findings, you have colitis that is Completely healed (Lee 0), though there is possible mild terminal ileitis.      IMPRESSION:    DIAGNOSIS:  # E3 UC in clinical remission on inflectra 10 mg/kg q4w in deep remission  # IBD Health Care Maintenance  Mr. Sparks is a 25 year old here with E3 UC in deep remission (icscope in 2018 showed Lee 0) on Inflectra 10 mg/kg q4w. Normal fecal yun 10/2022. We will plan for the following:    PLAN:  ---Continue IFX 10 mg/kg every 4 weeks  ---Dermatology for FBSE   ---Colon cancer screening to start in 2027, (MAC sedation).    Misc:  -- Avoid tobacco use  -- Avoid NSAIDs as there is potentially a 25% chance of causing an IBD flare  -- Vit D Supplementation      Andrew Winter PAOksanaC  Division of Gastroenterology, Hepatology and Nutrition  [facility]

## 2025-08-07 ENCOUNTER — HOME INFUSION (OUTPATIENT)
Dept: HOME HEALTH SERVICES | Facility: HOME HEALTH | Age: 25
End: 2025-08-07
Payer: COMMERCIAL

## 2025-08-11 ENCOUNTER — HOME INFUSION BILLING (OUTPATIENT)
Dept: HOME HEALTH SERVICES | Facility: HOME HEALTH | Age: 25
End: 2025-08-11
Payer: COMMERCIAL

## 2025-08-11 PROCEDURE — A4215 STERILE NEEDLE: HCPCS

## 2025-08-11 PROCEDURE — S1015 IV TUBING EXTENSION SET: HCPCS

## 2025-08-11 PROCEDURE — A4213 20+ CC SYRINGE ONLY: HCPCS

## 2025-08-13 ENCOUNTER — HOME CARE VISIT (OUTPATIENT)
Dept: HOME HEALTH SERVICES | Facility: HOME HEALTH | Age: 25
End: 2025-08-13
Payer: COMMERCIAL

## 2025-08-13 VITALS
DIASTOLIC BLOOD PRESSURE: 74 MMHG | OXYGEN SATURATION: 97 % | SYSTOLIC BLOOD PRESSURE: 130 MMHG | RESPIRATION RATE: 16 BRPM | HEART RATE: 48 BPM | WEIGHT: 182 LBS | BODY MASS INDEX: 29.38 KG/M2 | TEMPERATURE: 97.5 F

## 2025-08-13 PROCEDURE — 99601 HOME NFS VISIT <2 HRS: CPT

## 2025-08-13 PROCEDURE — S9359 HIT ANTI-TNF PER DIEM: HCPCS

## 2025-08-13 PROCEDURE — 99602 HOME NFS VISIT EACH ADDL HR: CPT

## 2025-08-13 PROCEDURE — A4217 STERILE WATER/SALINE, 500 ML: HCPCS | Mod: JZ

## (undated) DEVICE — ENDO TRAP POLYP E-TRAP 00711099

## (undated) DEVICE — SUCTION MANIFOLD NEPTUNE 2 SYS 1 PORT 702-025-000

## (undated) DEVICE — KIT ENDO TURNOVER/PROCEDURE CARRY-ON 101822

## (undated) DEVICE — GOWN IMPERVIOUS 2XL BLUE

## (undated) DEVICE — GOWN ISOLATION YELLOW UNIV NOT STERILE 3110PG

## (undated) DEVICE — SOL WATER IRRIG 500ML BOTTLE 2F7113

## (undated) DEVICE — ENDO FORCEP SPIKED SERRATED SHAFT JUMBO 239CM G56998

## (undated) DEVICE — ENDO FORCEP BX CAPTURA PRO SPIKE G50696

## (undated) DEVICE — ENDO SNARE EXACTO COLD 9MM LOOP 2.4MMX230CM 00711115

## (undated) DEVICE — SPECIMEN CONTAINER 3OZ W/FORMALIN 59901

## (undated) DEVICE — TUBING SUCTION MEDI-VAC 1/4"X20' N620A